# Patient Record
Sex: FEMALE | Race: WHITE | NOT HISPANIC OR LATINO | Employment: OTHER | URBAN - METROPOLITAN AREA
[De-identification: names, ages, dates, MRNs, and addresses within clinical notes are randomized per-mention and may not be internally consistent; named-entity substitution may affect disease eponyms.]

---

## 2017-01-06 ENCOUNTER — CONVERSION ENCOUNTER (OUTPATIENT)
Dept: GENERAL RADIOLOGY | Facility: HOSPITAL | Age: 72
End: 2017-01-06

## 2018-02-13 ENCOUNTER — CONVERSION ENCOUNTER (OUTPATIENT)
Dept: GENERAL RADIOLOGY | Facility: HOSPITAL | Age: 73
End: 2018-02-13

## 2019-02-25 ENCOUNTER — HOSPITAL ENCOUNTER (OUTPATIENT)
Dept: GENERAL RADIOLOGY | Facility: HOSPITAL | Age: 74
Discharge: HOME | End: 2019-02-25
Attending: NURSE PRACTITIONER

## 2019-03-15 ENCOUNTER — HOSPITAL ENCOUNTER (OUTPATIENT)
Dept: OTHER | Facility: HOSPITAL | Age: 74
Discharge: HOME | End: 2019-03-15
Attending: NURSE PRACTITIONER

## 2019-03-15 LAB
ALBUMIN SERPL-MCNC: 4 G/DL (ref 3.5–5)
ALBUMIN/GLOB SERPL: 1.4 {RATIO} (ref 1.4–2.6)
ALP SERPL-CCNC: 34 U/L (ref 43–160)
ALT SERPL-CCNC: 13 U/L (ref 10–40)
ANION GAP SERPL CALC-SCNC: 14 MMOL/L (ref 8–19)
AST SERPL-CCNC: 21 U/L (ref 15–50)
BILIRUB SERPL-MCNC: 0.49 MG/DL (ref 0.2–1.3)
BUN SERPL-MCNC: 20 MG/DL (ref 5–25)
BUN/CREAT SERPL: 19 {RATIO} (ref 6–20)
CALCIUM SERPL-MCNC: 9.6 MG/DL (ref 8.7–10.4)
CHLORIDE SERPL-SCNC: 104 MMOL/L (ref 99–111)
CONV CO2: 29 MMOL/L (ref 22–32)
CONV TOTAL PROTEIN: 6.9 G/DL (ref 6.3–8.2)
CREAT UR-MCNC: 1.04 MG/DL (ref 0.5–0.9)
GFR SERPLBLD BASED ON 1.73 SQ M-ARVRAT: 54 ML/MIN/{1.73_M2}
GLOBULIN UR ELPH-MCNC: 2.9 G/DL (ref 2–3.5)
GLUCOSE SERPL-MCNC: 89 MG/DL (ref 65–99)
OSMOLALITY SERPL CALC.SUM OF ELEC: 298 MOSM/KG (ref 273–304)
POTASSIUM SERPL-SCNC: 4.1 MMOL/L (ref 3.5–5.3)
SODIUM SERPL-SCNC: 143 MMOL/L (ref 135–147)

## 2019-03-27 ENCOUNTER — HOSPITAL ENCOUNTER (OUTPATIENT)
Dept: OTHER | Facility: HOSPITAL | Age: 74
Discharge: HOME | End: 2019-03-27
Attending: NURSE PRACTITIONER

## 2019-03-27 LAB
APPEARANCE UR: CLEAR
BILIRUB UR QL: NEGATIVE
COLOR UR: YELLOW
CONV COLLECTION SOURCE (UA): NORMAL
CONV UROBILINOGEN IN URINE BY AUTOMATED TEST STRIP: 0.2 {EHRLICHU}/DL (ref 0.1–1)
GLUCOSE UR QL: NEGATIVE MG/DL
HGB UR QL STRIP: NEGATIVE
KETONES UR QL STRIP: NEGATIVE MG/DL
LEUKOCYTE ESTERASE UR QL STRIP: NEGATIVE
NITRITE UR QL STRIP: NEGATIVE
PH UR STRIP.AUTO: 6 [PH] (ref 5–8)
PROT UR QL: NEGATIVE MG/DL
SP GR UR: 1.02 (ref 1–1.03)

## 2019-03-30 LAB
AMOXICILLIN+CLAV SUSC ISLT: 16
AMPICILLIN SUSC ISLT: >=32
AMPICILLIN+SULBAC SUSC ISLT: >=32
BACTERIA UR CULT: ABNORMAL
CEFAZOLIN SUSC ISLT: <=4
CEFEPIME SUSC ISLT: <=1
CEFTAZIDIME SUSC ISLT: <=1
CEFTRIAXONE SUSC ISLT: <=1
CEFUROXIME ORAL SUSC ISLT: 16
CEFUROXIME PARENTER SUSC ISLT: 16
CIPROFLOXACIN SUSC ISLT: >=4
ERTAPENEM SUSC ISLT: <=0.5
GENTAMICIN SUSC ISLT: <=1
LEVOFLOXACIN SUSC ISLT: >=8
NITROFURANTOIN SUSC ISLT: <=16
TETRACYCLINE SUSC ISLT: <=1
TMP SMX SUSC ISLT: >=320
TOBRAMYCIN SUSC ISLT: <=1

## 2019-06-11 ENCOUNTER — HOSPITAL ENCOUNTER (OUTPATIENT)
Dept: OTHER | Facility: HOSPITAL | Age: 74
Discharge: HOME | End: 2019-06-11
Attending: NURSE PRACTITIONER

## 2019-06-11 LAB
25(OH)D3 SERPL-MCNC: 30.3 NG/ML (ref 30–100)
ALBUMIN SERPL-MCNC: 4.3 G/DL (ref 3.5–5)
ALBUMIN/GLOB SERPL: 1.7 {RATIO} (ref 1.4–2.6)
ALP SERPL-CCNC: 35 U/L (ref 43–160)
ALT SERPL-CCNC: 13 U/L (ref 10–40)
ANION GAP SERPL CALC-SCNC: 17 MMOL/L (ref 8–19)
APPEARANCE UR: CLEAR
AST SERPL-CCNC: 20 U/L (ref 15–50)
BASOPHILS # BLD AUTO: 0.07 10*3/UL (ref 0–0.2)
BASOPHILS NFR BLD AUTO: 1.4 % (ref 0–3)
BILIRUB SERPL-MCNC: 0.54 MG/DL (ref 0.2–1.3)
BILIRUB UR QL: NEGATIVE
BUN SERPL-MCNC: 24 MG/DL (ref 5–25)
BUN/CREAT SERPL: 23 {RATIO} (ref 6–20)
CALCIUM SERPL-MCNC: 9.6 MG/DL (ref 8.7–10.4)
CHLORIDE SERPL-SCNC: 102 MMOL/L (ref 99–111)
CHOLEST SERPL-MCNC: 138 MG/DL (ref 107–200)
CHOLEST/HDLC SERPL: 2.6 {RATIO} (ref 3–6)
COLOR UR: YELLOW
CONV ABS IMM GRAN: 0.01 10*3/UL (ref 0–0.2)
CONV BACTERIA: ABNORMAL
CONV CO2: 29 MMOL/L (ref 22–32)
CONV COLLECTION SOURCE (UA): ABNORMAL
CONV HYALINE CASTS IN URINE MICRO: ABNORMAL /[LPF]
CONV IMMATURE GRAN: 0.2 % (ref 0–1.8)
CONV TOTAL PROTEIN: 6.8 G/DL (ref 6.3–8.2)
CONV UROBILINOGEN IN URINE BY AUTOMATED TEST STRIP: 0.2 {EHRLICHU}/DL (ref 0.1–1)
CREAT UR-MCNC: 1.04 MG/DL (ref 0.5–0.9)
DEPRECATED RDW RBC AUTO: 47.1 FL (ref 36.4–46.3)
EOSINOPHIL # BLD AUTO: 0.37 10*3/UL (ref 0–0.7)
EOSINOPHIL # BLD AUTO: 7.4 % (ref 0–7)
ERYTHROCYTE [DISTWIDTH] IN BLOOD BY AUTOMATED COUNT: 13.7 % (ref 11.7–14.4)
EST. AVERAGE GLUCOSE BLD GHB EST-MCNC: 103 MG/DL
FOLATE SERPL-MCNC: 17.4 NG/ML (ref 4.8–20)
GFR SERPLBLD BASED ON 1.73 SQ M-ARVRAT: 54 ML/MIN/{1.73_M2}
GLOBULIN UR ELPH-MCNC: 2.5 G/DL (ref 2–3.5)
GLUCOSE SERPL-MCNC: 96 MG/DL (ref 65–99)
GLUCOSE UR QL: NEGATIVE MG/DL
HBA1C MFR BLD: 15 G/DL (ref 12–16)
HBA1C MFR BLD: 5.2 % (ref 3.5–5.7)
HCT VFR BLD AUTO: 45.9 % (ref 37–47)
HDLC SERPL-MCNC: 54 MG/DL (ref 40–60)
HGB UR QL STRIP: NEGATIVE
IRON SATN MFR SERPL: 28 % (ref 20–55)
IRON SERPL-MCNC: 104 UG/DL (ref 60–170)
KETONES UR QL STRIP: NEGATIVE MG/DL
LDLC SERPL CALC-MCNC: 60 MG/DL (ref 70–100)
LEUKOCYTE ESTERASE UR QL STRIP: ABNORMAL
LYMPHOCYTES # BLD AUTO: 1.71 10*3/UL (ref 1–5)
MCH RBC QN AUTO: 30.5 PG (ref 27–31)
MCHC RBC AUTO-ENTMCNC: 32.7 G/DL (ref 33–37)
MCV RBC AUTO: 93.5 FL (ref 81–99)
MONOCYTES # BLD AUTO: 0.39 10*3/UL (ref 0.2–1.2)
MONOCYTES NFR BLD AUTO: 7.8 % (ref 3–10)
NEUTROPHILS # BLD AUTO: 2.45 10*3/UL (ref 2–8)
NEUTROPHILS NFR BLD AUTO: 49 % (ref 30–85)
NITRITE UR QL STRIP: NEGATIVE
NRBC CBCN: 0 % (ref 0–0.7)
OSMOLALITY SERPL CALC.SUM OF ELEC: 302 MOSM/KG (ref 273–304)
PH UR STRIP.AUTO: 6.5 [PH] (ref 5–8)
PLATELET # BLD AUTO: 214 10*3/UL (ref 130–400)
PMV BLD AUTO: 11 FL (ref 9.4–12.3)
POTASSIUM SERPL-SCNC: 4 MMOL/L (ref 3.5–5.3)
PROT UR QL: NEGATIVE MG/DL
RBC # BLD AUTO: 4.91 10*6/UL (ref 4.2–5.4)
RBC #/AREA URNS HPF: ABNORMAL /[HPF]
SODIUM SERPL-SCNC: 144 MMOL/L (ref 135–147)
SP GR UR: 1.02 (ref 1–1.03)
SQUAMOUS SPT QL MICRO: ABNORMAL /[HPF]
T4 FREE SERPL-MCNC: 1.2 NG/DL (ref 0.9–1.8)
TIBC SERPL-MCNC: 378 UG/DL (ref 245–450)
TRANSFERRIN SERPL-MCNC: 264 MG/DL (ref 250–380)
TRIGL SERPL-MCNC: 121 MG/DL (ref 40–150)
TSH SERPL-ACNC: 2.62 M[IU]/L (ref 0.27–4.2)
URATE SERPL-MCNC: 4.8 MG/DL (ref 2.5–7.5)
VARIANT LYMPHS NFR BLD MANUAL: 34.2 % (ref 20–45)
VIT B12 SERPL-MCNC: 544 PG/ML (ref 211–911)
VLDLC SERPL-MCNC: 24 MG/DL (ref 5–37)
WBC # BLD AUTO: 5 10*3/UL (ref 4.8–10.8)
WBC #/AREA URNS HPF: ABNORMAL /[HPF]

## 2019-06-13 LAB
AMPICILLIN SUSC ISLT: <=0.25
BACTERIA UR CULT: ABNORMAL
CEFOTAXIME SUSC ISLT: <=0.12
CEFTRIAXONE SUSC ISLT: <=0.12
LEVOFLOXACIN SUSC ISLT: 0.5
PENICILLIN G SUSC ISLT: <=0.06
TETRACYCLINE SUSC ISLT: >=16
VANCOMYCIN SUSC ISLT: <=0.12

## 2019-07-18 ENCOUNTER — HOSPITAL ENCOUNTER (OUTPATIENT)
Dept: OTHER | Facility: HOSPITAL | Age: 74
Discharge: HOME | End: 2019-07-18
Attending: NURSE PRACTITIONER

## 2019-07-18 LAB
APPEARANCE UR: CLEAR
BILIRUB UR QL: NEGATIVE
COLOR UR: YELLOW
CONV BACTERIA: NEGATIVE
CONV COLLECTION SOURCE (UA): ABNORMAL
CONV UROBILINOGEN IN URINE BY AUTOMATED TEST STRIP: 0.2 {EHRLICHU}/DL (ref 0.1–1)
GLUCOSE UR QL: NEGATIVE MG/DL
HGB UR QL STRIP: NEGATIVE
KETONES UR QL STRIP: NEGATIVE MG/DL
LEUKOCYTE ESTERASE UR QL STRIP: ABNORMAL
NITRITE UR QL STRIP: NEGATIVE
PH UR STRIP.AUTO: 7 [PH] (ref 5–8)
PROT UR QL: NEGATIVE MG/DL
RBC #/AREA URNS HPF: ABNORMAL /[HPF]
SP GR UR: 1.02 (ref 1–1.03)
SQUAMOUS SPT QL MICRO: ABNORMAL /[HPF]
WBC #/AREA URNS HPF: ABNORMAL /[HPF]

## 2019-07-20 LAB — BACTERIA UR CULT: NORMAL

## 2019-08-09 ENCOUNTER — HOSPITAL ENCOUNTER (OUTPATIENT)
Dept: OTHER | Facility: HOSPITAL | Age: 74
Discharge: HOME | End: 2019-08-09
Attending: INTERNAL MEDICINE

## 2019-08-09 LAB
25(OH)D3 SERPL-MCNC: 33.6 NG/ML (ref 30–100)
ALBUMIN SERPL-MCNC: 4.7 G/DL (ref 3.5–5)
ANION GAP SERPL CALC-SCNC: 21 MMOL/L (ref 8–19)
APPEARANCE UR: CLEAR
BASOPHILS # BLD AUTO: 0.06 10*3/UL (ref 0–0.2)
BASOPHILS NFR BLD AUTO: 1.3 % (ref 0–3)
BILIRUB UR QL: NEGATIVE
BUN SERPL-MCNC: 25 MG/DL (ref 5–25)
BUN/CREAT SERPL: 23 {RATIO} (ref 6–20)
CALCIUM SERPL-MCNC: 10.3 MG/DL (ref 8.7–10.4)
CHLORIDE SERPL-SCNC: 102 MMOL/L (ref 99–111)
COLOR UR: YELLOW
CONV ABS IMM GRAN: 0 10*3/UL (ref 0–0.2)
CONV BACTERIA: NEGATIVE
CONV CO2: 25 MMOL/L (ref 22–32)
CONV COLLECTION SOURCE (UA): ABNORMAL
CONV CREATININE URINE, RANDOM: 85.8 MG/DL (ref 10–300)
CONV IMMATURE GRAN: 0 % (ref 0–1.8)
CONV UROBILINOGEN IN URINE BY AUTOMATED TEST STRIP: 0.2 {EHRLICHU}/DL (ref 0.1–1)
CREAT UR-MCNC: 1.07 MG/DL (ref 0.5–0.9)
DEPRECATED RDW RBC AUTO: 46.2 FL (ref 36.4–46.3)
EOSINOPHIL # BLD AUTO: 0.31 10*3/UL (ref 0–0.7)
EOSINOPHIL # BLD AUTO: 6.6 % (ref 0–7)
ERYTHROCYTE [DISTWIDTH] IN BLOOD BY AUTOMATED COUNT: 13.4 % (ref 11.7–14.4)
GFR SERPLBLD BASED ON 1.73 SQ M-ARVRAT: 52 ML/MIN/{1.73_M2}
GLUCOSE SERPL-MCNC: 94 MG/DL (ref 65–99)
GLUCOSE UR QL: NEGATIVE MG/DL
HCT VFR BLD AUTO: 47.5 % (ref 37–47)
HGB BLD-MCNC: 15.3 G/DL (ref 12–16)
HGB UR QL STRIP: NEGATIVE
KETONES UR QL STRIP: NEGATIVE MG/DL
LEUKOCYTE ESTERASE UR QL STRIP: ABNORMAL
LYMPHOCYTES # BLD AUTO: 1.53 10*3/UL (ref 1–5)
LYMPHOCYTES NFR BLD AUTO: 32.8 % (ref 20–45)
MCH RBC QN AUTO: 30.2 PG (ref 27–31)
MCHC RBC AUTO-ENTMCNC: 32.2 G/DL (ref 33–37)
MCV RBC AUTO: 93.9 FL (ref 81–99)
MONOCYTES # BLD AUTO: 0.41 10*3/UL (ref 0.2–1.2)
MONOCYTES NFR BLD AUTO: 8.8 % (ref 3–10)
NEUTROPHILS # BLD AUTO: 2.36 10*3/UL (ref 2–8)
NEUTROPHILS NFR BLD AUTO: 50.5 % (ref 30–85)
NITRITE UR QL STRIP: NEGATIVE
NRBC CBCN: 0 % (ref 0–0.7)
PH UR STRIP.AUTO: 6 [PH] (ref 5–8)
PHOSPHATE SERPL-MCNC: 3.2 MG/DL (ref 2.4–4.5)
PLATELET # BLD AUTO: 232 10*3/UL (ref 130–400)
PMV BLD AUTO: 10.8 FL (ref 9.4–12.3)
POTASSIUM SERPL-SCNC: 4.3 MMOL/L (ref 3.5–5.3)
PROT UR QL: NEGATIVE MG/DL
PROT UR-MCNC: 6.1 MG/DL
PTH-INTACT SERPL-MCNC: 24.7 PG/ML (ref 11.1–79.5)
RBC # BLD AUTO: 5.06 10*6/UL (ref 4.2–5.4)
RBC #/AREA URNS HPF: ABNORMAL /[HPF]
SODIUM SERPL-SCNC: 144 MMOL/L (ref 135–147)
SP GR UR: 1.02 (ref 1–1.03)
SQUAMOUS SPT QL MICRO: ABNORMAL /[HPF]
WBC # BLD AUTO: 4.67 10*3/UL (ref 4.8–10.8)
WBC #/AREA URNS HPF: ABNORMAL /[HPF]

## 2019-12-04 ENCOUNTER — HOSPITAL ENCOUNTER (OUTPATIENT)
Dept: OTHER | Facility: HOSPITAL | Age: 74
Discharge: HOME | End: 2019-12-04
Attending: NURSE PRACTITIONER

## 2019-12-04 LAB
25(OH)D3 SERPL-MCNC: 34.6 NG/ML (ref 30–100)
ALBUMIN SERPL-MCNC: 4.3 G/DL (ref 3.5–5)
ALBUMIN/GLOB SERPL: 1.5 {RATIO} (ref 1.4–2.6)
ALP SERPL-CCNC: 40 U/L (ref 43–160)
ALT SERPL-CCNC: 19 U/L (ref 10–40)
ANION GAP SERPL CALC-SCNC: 17 MMOL/L (ref 8–19)
AST SERPL-CCNC: 27 U/L (ref 15–50)
BASOPHILS # BLD AUTO: 0.05 10*3/UL (ref 0–0.2)
BASOPHILS NFR BLD AUTO: 1.1 % (ref 0–3)
BILIRUB SERPL-MCNC: 0.4 MG/DL (ref 0.2–1.3)
BUN SERPL-MCNC: 23 MG/DL (ref 5–25)
BUN/CREAT SERPL: 23 {RATIO} (ref 6–20)
CALCIUM SERPL-MCNC: 10.1 MG/DL (ref 8.7–10.4)
CHLORIDE SERPL-SCNC: 101 MMOL/L (ref 99–111)
CHOLEST SERPL-MCNC: 146 MG/DL (ref 107–200)
CHOLEST/HDLC SERPL: 2.5 {RATIO} (ref 3–6)
CONV ABS IMM GRAN: 0.01 10*3/UL (ref 0–0.2)
CONV CO2: 28 MMOL/L (ref 22–32)
CONV CREATININE URINE, RANDOM: 59 MG/DL (ref 10–300)
CONV IMMATURE GRAN: 0.2 % (ref 0–1.8)
CONV MICROALBUM.,U,RANDOM: <12 MG/L (ref 0–20)
CONV TOTAL PROTEIN: 7.2 G/DL (ref 6.3–8.2)
CREAT UR-MCNC: 1.02 MG/DL (ref 0.5–0.9)
DEPRECATED RDW RBC AUTO: 47.7 FL (ref 36.4–46.3)
EOSINOPHIL # BLD AUTO: 0.31 10*3/UL (ref 0–0.7)
EOSINOPHIL # BLD AUTO: 6.8 % (ref 0–7)
ERYTHROCYTE [DISTWIDTH] IN BLOOD BY AUTOMATED COUNT: 13.6 % (ref 11.7–14.4)
EST. AVERAGE GLUCOSE BLD GHB EST-MCNC: 100 MG/DL
FOLATE SERPL-MCNC: 12.9 NG/ML (ref 4.8–20)
GFR SERPLBLD BASED ON 1.73 SQ M-ARVRAT: 55 ML/MIN/{1.73_M2}
GLOBULIN UR ELPH-MCNC: 2.9 G/DL (ref 2–3.5)
GLUCOSE SERPL-MCNC: 90 MG/DL (ref 65–99)
HBA1C MFR BLD: 5.1 % (ref 3.5–5.7)
HCT VFR BLD AUTO: 47.5 % (ref 37–47)
HDLC SERPL-MCNC: 58 MG/DL (ref 40–60)
HGB BLD-MCNC: 15.2 G/DL (ref 12–16)
IRON SATN MFR SERPL: 20 % (ref 20–55)
IRON SERPL-MCNC: 86 UG/DL (ref 60–170)
LDLC SERPL CALC-MCNC: 68 MG/DL (ref 70–100)
LYMPHOCYTES # BLD AUTO: 1.55 10*3/UL (ref 1–5)
LYMPHOCYTES NFR BLD AUTO: 34.1 % (ref 20–45)
MCH RBC QN AUTO: 30.5 PG (ref 27–31)
MCHC RBC AUTO-ENTMCNC: 32 G/DL (ref 33–37)
MCV RBC AUTO: 95.4 FL (ref 81–99)
MICROALBUMIN/CREAT UR: 20.3 MG/G{CRE} (ref 0–35)
MONOCYTES # BLD AUTO: 0.35 10*3/UL (ref 0.2–1.2)
MONOCYTES NFR BLD AUTO: 7.7 % (ref 3–10)
NEUTROPHILS # BLD AUTO: 2.27 10*3/UL (ref 2–8)
NEUTROPHILS NFR BLD AUTO: 50.1 % (ref 30–85)
NRBC CBCN: 0 % (ref 0–0.7)
OSMOLALITY SERPL CALC.SUM OF ELEC: 297 MOSM/KG (ref 273–304)
PLATELET # BLD AUTO: 222 10*3/UL (ref 130–400)
PMV BLD AUTO: 11.2 FL (ref 9.4–12.3)
POTASSIUM SERPL-SCNC: 4 MMOL/L (ref 3.5–5.3)
RBC # BLD AUTO: 4.98 10*6/UL (ref 4.2–5.4)
SODIUM SERPL-SCNC: 142 MMOL/L (ref 135–147)
T4 FREE SERPL-MCNC: 1.2 NG/DL (ref 0.9–1.8)
TIBC SERPL-MCNC: 423 UG/DL (ref 245–450)
TRANSFERRIN SERPL-MCNC: 296 MG/DL (ref 250–380)
TRIGL SERPL-MCNC: 98 MG/DL (ref 40–150)
TSH SERPL-ACNC: 1.7 M[IU]/L (ref 0.27–4.2)
URATE SERPL-MCNC: 4.6 MG/DL (ref 2.5–7.5)
VIT B12 SERPL-MCNC: 401 PG/ML (ref 211–911)
VLDLC SERPL-MCNC: 20 MG/DL (ref 5–37)
WBC # BLD AUTO: 4.54 10*3/UL (ref 4.8–10.8)

## 2020-02-27 ENCOUNTER — HOSPITAL ENCOUNTER (OUTPATIENT)
Dept: GENERAL RADIOLOGY | Facility: HOSPITAL | Age: 75
Discharge: HOME | End: 2020-02-27
Attending: NURSE PRACTITIONER

## 2020-05-15 ENCOUNTER — HOSPITAL ENCOUNTER (OUTPATIENT)
Dept: LAB | Facility: HOSPITAL | Age: 75
Discharge: HOME | End: 2020-05-15
Attending: NURSE PRACTITIONER

## 2020-05-15 LAB
25(OH)D3 SERPL-MCNC: 36.5 NG/ML (ref 30–100)
ALBUMIN SERPL-MCNC: 4.4 G/DL (ref 3.5–5)
ALBUMIN/GLOB SERPL: 1.6 {RATIO} (ref 1.4–2.6)
ALP SERPL-CCNC: 34 U/L (ref 43–160)
ALT SERPL-CCNC: 16 U/L (ref 10–40)
ANION GAP SERPL CALC-SCNC: 17 MMOL/L (ref 8–19)
AST SERPL-CCNC: 25 U/L (ref 15–50)
BASOPHILS # BLD AUTO: 0.04 10*3/UL (ref 0–0.2)
BASOPHILS NFR BLD AUTO: 0.7 % (ref 0–3)
BILIRUB SERPL-MCNC: 0.45 MG/DL (ref 0.2–1.3)
BUN SERPL-MCNC: 28 MG/DL (ref 5–25)
BUN/CREAT SERPL: 26 {RATIO} (ref 6–20)
CALCIUM SERPL-MCNC: 9.9 MG/DL (ref 8.7–10.4)
CHLORIDE SERPL-SCNC: 102 MMOL/L (ref 99–111)
CHOLEST SERPL-MCNC: 141 MG/DL (ref 107–200)
CHOLEST/HDLC SERPL: 2.4 {RATIO} (ref 3–6)
CONV ABS IMM GRAN: 0.01 10*3/UL (ref 0–0.2)
CONV CO2: 27 MMOL/L (ref 22–32)
CONV CREATININE URINE, RANDOM: 61 MG/DL (ref 10–300)
CONV IMMATURE GRAN: 0.2 % (ref 0–1.8)
CONV MICROALBUM.,U,RANDOM: <12 MG/L (ref 0–20)
CONV TOTAL PROTEIN: 7.1 G/DL (ref 6.3–8.2)
CREAT UR-MCNC: 1.09 MG/DL (ref 0.5–0.9)
DEPRECATED RDW RBC AUTO: 49.3 FL (ref 36.4–46.3)
EOSINOPHIL # BLD AUTO: 0.56 10*3/UL (ref 0–0.7)
EOSINOPHIL # BLD AUTO: 10.4 % (ref 0–7)
ERYTHROCYTE [DISTWIDTH] IN BLOOD BY AUTOMATED COUNT: 14 % (ref 11.7–14.4)
EST. AVERAGE GLUCOSE BLD GHB EST-MCNC: 111 MG/DL
FOLATE SERPL-MCNC: 12.8 NG/ML (ref 4.8–20)
GFR SERPLBLD BASED ON 1.73 SQ M-ARVRAT: 51 ML/MIN/{1.73_M2}
GLOBULIN UR ELPH-MCNC: 2.7 G/DL (ref 2–3.5)
GLUCOSE SERPL-MCNC: 92 MG/DL (ref 65–99)
HBA1C MFR BLD: 5.5 % (ref 3.5–5.7)
HCT VFR BLD AUTO: 46.6 % (ref 37–47)
HDLC SERPL-MCNC: 58 MG/DL (ref 40–60)
HGB BLD-MCNC: 14.6 G/DL (ref 12–16)
IRON SATN MFR SERPL: 27 % (ref 20–55)
IRON SERPL-MCNC: 103 UG/DL (ref 60–170)
LDLC SERPL CALC-MCNC: 62 MG/DL (ref 70–100)
LYMPHOCYTES # BLD AUTO: 1.76 10*3/UL (ref 1–5)
LYMPHOCYTES NFR BLD AUTO: 32.7 % (ref 20–45)
MCH RBC QN AUTO: 30.2 PG (ref 27–31)
MCHC RBC AUTO-ENTMCNC: 31.3 G/DL (ref 33–37)
MCV RBC AUTO: 96.5 FL (ref 81–99)
MICROALBUMIN/CREAT UR: 19.7 MG/G{CRE} (ref 0–35)
MONOCYTES # BLD AUTO: 0.42 10*3/UL (ref 0.2–1.2)
MONOCYTES NFR BLD AUTO: 7.8 % (ref 3–10)
NEUTROPHILS # BLD AUTO: 2.59 10*3/UL (ref 2–8)
NEUTROPHILS NFR BLD AUTO: 48.2 % (ref 30–85)
NRBC CBCN: 0 % (ref 0–0.7)
OSMOLALITY SERPL CALC.SUM OF ELEC: 299 MOSM/KG (ref 273–304)
PLATELET # BLD AUTO: 218 10*3/UL (ref 130–400)
PMV BLD AUTO: 11.1 FL (ref 9.4–12.3)
POTASSIUM SERPL-SCNC: 4.1 MMOL/L (ref 3.5–5.3)
RBC # BLD AUTO: 4.83 10*6/UL (ref 4.2–5.4)
SODIUM SERPL-SCNC: 142 MMOL/L (ref 135–147)
T4 FREE SERPL-MCNC: 1.3 NG/DL (ref 0.9–1.8)
TIBC SERPL-MCNC: 380 UG/DL (ref 245–450)
TRANSFERRIN SERPL-MCNC: 266 MG/DL (ref 250–380)
TRIGL SERPL-MCNC: 104 MG/DL (ref 40–150)
TSH SERPL-ACNC: 2 M[IU]/L (ref 0.27–4.2)
URATE SERPL-MCNC: 5.1 MG/DL (ref 2.5–7.5)
VIT B12 SERPL-MCNC: 470 PG/ML (ref 211–911)
VLDLC SERPL-MCNC: 21 MG/DL (ref 5–37)
WBC # BLD AUTO: 5.38 10*3/UL (ref 4.8–10.8)

## 2020-08-04 ENCOUNTER — HOSPITAL ENCOUNTER (OUTPATIENT)
Dept: LAB | Facility: HOSPITAL | Age: 75
Discharge: HOME | End: 2020-08-04
Attending: INTERNAL MEDICINE

## 2020-08-04 LAB
25(OH)D3 SERPL-MCNC: 39.2 NG/ML (ref 30–100)
ALBUMIN SERPL-MCNC: 4.2 G/DL (ref 3.5–5)
ANION GAP SERPL CALC-SCNC: 18 MMOL/L (ref 8–19)
APPEARANCE UR: CLEAR
BASOPHILS # BLD AUTO: 0.03 10*3/UL (ref 0–0.2)
BASOPHILS NFR BLD AUTO: 0.7 % (ref 0–3)
BILIRUB UR QL: NEGATIVE
BUN SERPL-MCNC: 26 MG/DL (ref 5–25)
BUN/CREAT SERPL: 22 {RATIO} (ref 6–20)
CALCIUM SERPL-MCNC: 10.3 MG/DL (ref 8.7–10.4)
CHLORIDE SERPL-SCNC: 101 MMOL/L (ref 99–111)
COLOR UR: YELLOW
CONV ABS IMM GRAN: 0 10*3/UL (ref 0–0.2)
CONV CO2: 26 MMOL/L (ref 22–32)
CONV COLLECTION SOURCE (UA): NORMAL
CONV CREATININE URINE, RANDOM: 66.4 MG/DL (ref 10–300)
CONV IMMATURE GRAN: 0 % (ref 0–1.8)
CONV UROBILINOGEN IN URINE BY AUTOMATED TEST STRIP: 1 {EHRLICHU}/DL (ref 0.1–1)
CREAT UR-MCNC: 1.16 MG/DL (ref 0.5–0.9)
DEPRECATED RDW RBC AUTO: 48.6 FL (ref 36.4–46.3)
EOSINOPHIL # BLD AUTO: 0.43 10*3/UL (ref 0–0.7)
EOSINOPHIL # BLD AUTO: 9.7 % (ref 0–7)
ERYTHROCYTE [DISTWIDTH] IN BLOOD BY AUTOMATED COUNT: 13.6 % (ref 11.7–14.4)
GFR SERPLBLD BASED ON 1.73 SQ M-ARVRAT: 47 ML/MIN/{1.73_M2}
GLUCOSE SERPL-MCNC: 89 MG/DL (ref 65–99)
GLUCOSE UR QL: NEGATIVE MG/DL
HCT VFR BLD AUTO: 46.8 % (ref 37–47)
HGB BLD-MCNC: 14.9 G/DL (ref 12–16)
HGB UR QL STRIP: NEGATIVE
KETONES UR QL STRIP: NEGATIVE MG/DL
LEUKOCYTE ESTERASE UR QL STRIP: NEGATIVE
LYMPHOCYTES # BLD AUTO: 1.46 10*3/UL (ref 1–5)
LYMPHOCYTES NFR BLD AUTO: 33 % (ref 20–45)
MCH RBC QN AUTO: 30.7 PG (ref 27–31)
MCHC RBC AUTO-ENTMCNC: 31.8 G/DL (ref 33–37)
MCV RBC AUTO: 96.3 FL (ref 81–99)
MONOCYTES # BLD AUTO: 0.31 10*3/UL (ref 0.2–1.2)
MONOCYTES NFR BLD AUTO: 7 % (ref 3–10)
NEUTROPHILS # BLD AUTO: 2.2 10*3/UL (ref 2–8)
NEUTROPHILS NFR BLD AUTO: 49.6 % (ref 30–85)
NITRITE UR QL STRIP: NEGATIVE
NRBC CBCN: 0 % (ref 0–0.7)
PH UR STRIP.AUTO: 7 [PH] (ref 5–8)
PHOSPHATE SERPL-MCNC: 3.5 MG/DL (ref 2.4–4.5)
PLATELET # BLD AUTO: 205 10*3/UL (ref 130–400)
PMV BLD AUTO: 11.2 FL (ref 9.4–12.3)
POTASSIUM SERPL-SCNC: 3.8 MMOL/L (ref 3.5–5.3)
PROT UR QL: NEGATIVE MG/DL
PROT UR-MCNC: <4 MG/DL
PTH-INTACT SERPL-MCNC: 20.4 PG/ML (ref 11.1–79.5)
RBC # BLD AUTO: 4.86 10*6/UL (ref 4.2–5.4)
SODIUM SERPL-SCNC: 141 MMOL/L (ref 135–147)
SP GR UR: 1.01 (ref 1–1.03)
WBC # BLD AUTO: 4.43 10*3/UL (ref 4.8–10.8)

## 2020-09-08 ENCOUNTER — HOSPITAL ENCOUNTER (OUTPATIENT)
Dept: LAB | Facility: HOSPITAL | Age: 75
Discharge: HOME | End: 2020-09-08
Attending: NURSE PRACTITIONER

## 2020-09-08 LAB
25(OH)D3 SERPL-MCNC: 40 NG/ML (ref 30–100)
ALBUMIN SERPL-MCNC: 4.2 G/DL (ref 3.5–5)
ALBUMIN/GLOB SERPL: 1.7 {RATIO} (ref 1.4–2.6)
ALP SERPL-CCNC: 36 U/L (ref 43–160)
ALT SERPL-CCNC: 14 U/L (ref 10–40)
ANION GAP SERPL CALC-SCNC: 14 MMOL/L (ref 8–19)
AST SERPL-CCNC: 23 U/L (ref 15–50)
BASOPHILS # BLD AUTO: 0.04 10*3/UL (ref 0–0.2)
BASOPHILS NFR BLD AUTO: 0.9 % (ref 0–3)
BILIRUB SERPL-MCNC: 0.5 MG/DL (ref 0.2–1.3)
BUN SERPL-MCNC: 43 MG/DL (ref 5–25)
BUN/CREAT SERPL: 37 {RATIO} (ref 6–20)
CALCIUM SERPL-MCNC: 9.4 MG/DL (ref 8.7–10.4)
CHLORIDE SERPL-SCNC: 102 MMOL/L (ref 99–111)
CHOLEST SERPL-MCNC: 141 MG/DL (ref 107–200)
CHOLEST/HDLC SERPL: 2.8 {RATIO} (ref 3–6)
CONV ABS IMM GRAN: 0.01 10*3/UL (ref 0–0.2)
CONV CO2: 27 MMOL/L (ref 22–32)
CONV CREATININE URINE, RANDOM: 57.7 MG/DL (ref 10–300)
CONV IMMATURE GRAN: 0.2 % (ref 0–1.8)
CONV MICROALBUM.,U,RANDOM: 48.1 MG/L (ref 0–20)
CONV TOTAL PROTEIN: 6.7 G/DL (ref 6.3–8.2)
CREAT UR-MCNC: 1.17 MG/DL (ref 0.5–0.9)
DEPRECATED RDW RBC AUTO: 48 FL (ref 36.4–46.3)
EOSINOPHIL # BLD AUTO: 0.32 10*3/UL (ref 0–0.7)
EOSINOPHIL # BLD AUTO: 7.3 % (ref 0–7)
ERYTHROCYTE [DISTWIDTH] IN BLOOD BY AUTOMATED COUNT: 13.6 % (ref 11.7–14.4)
EST. AVERAGE GLUCOSE BLD GHB EST-MCNC: 105 MG/DL
FOLATE SERPL-MCNC: 10.5 NG/ML (ref 4.8–20)
GFR SERPLBLD BASED ON 1.73 SQ M-ARVRAT: 46 ML/MIN/{1.73_M2}
GLOBULIN UR ELPH-MCNC: 2.5 G/DL (ref 2–3.5)
GLUCOSE SERPL-MCNC: 97 MG/DL (ref 65–99)
HBA1C MFR BLD: 5.3 % (ref 3.5–5.7)
HCT VFR BLD AUTO: 46.7 % (ref 37–47)
HDLC SERPL-MCNC: 51 MG/DL (ref 40–60)
HGB BLD-MCNC: 14.9 G/DL (ref 12–16)
IRON SATN MFR SERPL: 23 % (ref 20–55)
IRON SERPL-MCNC: 92 UG/DL (ref 60–170)
LDLC SERPL CALC-MCNC: 69 MG/DL (ref 70–100)
LYMPHOCYTES # BLD AUTO: 1.29 10*3/UL (ref 1–5)
LYMPHOCYTES NFR BLD AUTO: 29.3 % (ref 20–45)
MCH RBC QN AUTO: 30.3 PG (ref 27–31)
MCHC RBC AUTO-ENTMCNC: 31.9 G/DL (ref 33–37)
MCV RBC AUTO: 95.1 FL (ref 81–99)
MICROALBUMIN/CREAT UR: 83.4 MG/G{CRE} (ref 0–35)
MONOCYTES # BLD AUTO: 0.45 10*3/UL (ref 0.2–1.2)
MONOCYTES NFR BLD AUTO: 10.2 % (ref 3–10)
NEUTROPHILS # BLD AUTO: 2.3 10*3/UL (ref 2–8)
NEUTROPHILS NFR BLD AUTO: 52.1 % (ref 30–85)
NRBC CBCN: 0 % (ref 0–0.7)
OSMOLALITY SERPL CALC.SUM OF ELEC: 299 MOSM/KG (ref 273–304)
PLATELET # BLD AUTO: 207 10*3/UL (ref 130–400)
PMV BLD AUTO: 10.7 FL (ref 9.4–12.3)
POTASSIUM SERPL-SCNC: 3.8 MMOL/L (ref 3.5–5.3)
RBC # BLD AUTO: 4.91 10*6/UL (ref 4.2–5.4)
SODIUM SERPL-SCNC: 139 MMOL/L (ref 135–147)
T4 FREE SERPL-MCNC: 1.1 NG/DL (ref 0.9–1.8)
TIBC SERPL-MCNC: 408 UG/DL (ref 245–450)
TRANSFERRIN SERPL-MCNC: 285 MG/DL (ref 250–380)
TRIGL SERPL-MCNC: 106 MG/DL (ref 40–150)
TSH SERPL-ACNC: 2.34 M[IU]/L (ref 0.27–4.2)
URATE SERPL-MCNC: 5.7 MG/DL (ref 2.5–7.5)
VIT B12 SERPL-MCNC: 513 PG/ML (ref 211–911)
VLDLC SERPL-MCNC: 21 MG/DL (ref 5–37)
WBC # BLD AUTO: 4.41 10*3/UL (ref 4.8–10.8)

## 2020-09-11 ENCOUNTER — HOSPITAL ENCOUNTER (OUTPATIENT)
Dept: OTHER | Facility: HOSPITAL | Age: 75
Discharge: HOME | End: 2020-09-11
Attending: NURSE PRACTITIONER

## 2020-09-11 LAB
APPEARANCE UR: ABNORMAL
BILIRUB UR QL: NEGATIVE
COLOR UR: YELLOW
CONV BACTERIA: ABNORMAL
CONV COLLECTION SOURCE (UA): ABNORMAL
CONV HYALINE CASTS IN URINE MICRO: ABNORMAL /[LPF]
CONV UROBILINOGEN IN URINE BY AUTOMATED TEST STRIP: 0.2 {EHRLICHU}/DL (ref 0.1–1)
GLUCOSE UR QL: NEGATIVE MG/DL
HGB UR QL STRIP: ABNORMAL
KETONES UR QL STRIP: NEGATIVE MG/DL
LEUKOCYTE ESTERASE UR QL STRIP: ABNORMAL
NITRITE UR QL STRIP: NEGATIVE
PH UR STRIP.AUTO: 6.5 [PH] (ref 5–8)
PROT UR QL: ABNORMAL MG/DL
RBC #/AREA URNS HPF: ABNORMAL /[HPF]
SP GR UR: 1.02 (ref 1–1.03)
SQUAMOUS SPT QL MICRO: ABNORMAL /[HPF]
WBC #/AREA URNS HPF: ABNORMAL /[HPF]

## 2020-09-14 LAB
BACTERIA UR CULT: ABNORMAL
CIPROFLOXACIN SUSC ISLT: <=0.5
DAPTOMYCIN SUSC ISLT: 0.5
DOXYCYCLINE SUSC ISLT: <=0.5
ERYTHROMYCIN SUSC ISLT: >=8
GENTAMICIN SUSC ISLT: <=0.5
LEVOFLOXACIN SUSC ISLT: 0.25
NITROFURANTOIN SUSC ISLT: <=16
OXACILLIN SUSC ISLT: <=0.25
RIFAMPIN SUSC ISLT: <=0.5
TETRACYCLINE SUSC ISLT: <=1
TIGECYCLINE SUSC ISLT: <=0.12
TMP SMX SUSC ISLT: <=10
VANCOMYCIN SUSC ISLT: <=0.5

## 2020-12-03 ENCOUNTER — HOSPITAL ENCOUNTER (OUTPATIENT)
Dept: LAB | Facility: HOSPITAL | Age: 75
Discharge: HOME | End: 2020-12-03
Attending: NURSE PRACTITIONER

## 2020-12-03 LAB
25(OH)D3 SERPL-MCNC: 37.1 NG/ML (ref 30–100)
ALBUMIN SERPL-MCNC: 4.3 G/DL (ref 3.5–5)
ALBUMIN/GLOB SERPL: 1.7 {RATIO} (ref 1.4–2.6)
ALP SERPL-CCNC: 38 U/L (ref 43–160)
ALT SERPL-CCNC: 16 U/L (ref 10–40)
ANION GAP SERPL CALC-SCNC: 17 MMOL/L (ref 8–19)
AST SERPL-CCNC: 29 U/L (ref 15–50)
BASOPHILS # BLD AUTO: 0.05 10*3/UL (ref 0–0.2)
BASOPHILS NFR BLD AUTO: 1.1 % (ref 0–3)
BILIRUB SERPL-MCNC: 0.49 MG/DL (ref 0.2–1.3)
BUN SERPL-MCNC: 28 MG/DL (ref 5–25)
BUN/CREAT SERPL: 25 {RATIO} (ref 6–20)
CALCIUM SERPL-MCNC: 10 MG/DL (ref 8.7–10.4)
CHLORIDE SERPL-SCNC: 102 MMOL/L (ref 99–111)
CHOLEST SERPL-MCNC: 136 MG/DL (ref 107–200)
CHOLEST/HDLC SERPL: 2.4 {RATIO} (ref 3–6)
CONV ABS IMM GRAN: 0.01 10*3/UL (ref 0–0.2)
CONV CO2: 26 MMOL/L (ref 22–32)
CONV CREATININE URINE, RANDOM: 57.9 MG/DL (ref 10–300)
CONV IMMATURE GRAN: 0.2 % (ref 0–1.8)
CONV MICROALBUM.,U,RANDOM: <12 MG/L (ref 0–20)
CONV TOTAL PROTEIN: 6.9 G/DL (ref 6.3–8.2)
CREAT UR-MCNC: 1.11 MG/DL (ref 0.5–0.9)
DEPRECATED RDW RBC AUTO: 47.4 FL (ref 36.4–46.3)
EOSINOPHIL # BLD AUTO: 0.34 10*3/UL (ref 0–0.7)
EOSINOPHIL # BLD AUTO: 7.4 % (ref 0–7)
ERYTHROCYTE [DISTWIDTH] IN BLOOD BY AUTOMATED COUNT: 13.8 % (ref 11.7–14.4)
EST. AVERAGE GLUCOSE BLD GHB EST-MCNC: 108 MG/DL
GFR SERPLBLD BASED ON 1.73 SQ M-ARVRAT: 49 ML/MIN/{1.73_M2}
GLOBULIN UR ELPH-MCNC: 2.6 G/DL (ref 2–3.5)
GLUCOSE SERPL-MCNC: 88 MG/DL (ref 65–99)
HBA1C MFR BLD: 5.4 % (ref 3.5–5.7)
HCT VFR BLD AUTO: 46.4 % (ref 37–47)
HDLC SERPL-MCNC: 56 MG/DL (ref 40–60)
HGB BLD-MCNC: 15 G/DL (ref 12–16)
IRON SATN MFR SERPL: 20 % (ref 20–55)
IRON SERPL-MCNC: 80 UG/DL (ref 60–170)
LDLC SERPL CALC-MCNC: 62 MG/DL (ref 70–100)
LYMPHOCYTES # BLD AUTO: 1.54 10*3/UL (ref 1–5)
LYMPHOCYTES NFR BLD AUTO: 33.6 % (ref 20–45)
MCH RBC QN AUTO: 30.3 PG (ref 27–31)
MCHC RBC AUTO-ENTMCNC: 32.3 G/DL (ref 33–37)
MCV RBC AUTO: 93.7 FL (ref 81–99)
MICROALBUMIN/CREAT UR: 20.7 MG/G{CRE} (ref 0–35)
MONOCYTES # BLD AUTO: 0.4 10*3/UL (ref 0.2–1.2)
MONOCYTES NFR BLD AUTO: 8.7 % (ref 3–10)
NEUTROPHILS # BLD AUTO: 2.24 10*3/UL (ref 2–8)
NEUTROPHILS NFR BLD AUTO: 49 % (ref 30–85)
NRBC CBCN: 0 % (ref 0–0.7)
OSMOLALITY SERPL CALC.SUM OF ELEC: 297 MOSM/KG (ref 273–304)
PLATELET # BLD AUTO: 221 10*3/UL (ref 130–400)
PMV BLD AUTO: 10.6 FL (ref 9.4–12.3)
POTASSIUM SERPL-SCNC: 3.9 MMOL/L (ref 3.5–5.3)
RBC # BLD AUTO: 4.95 10*6/UL (ref 4.2–5.4)
SODIUM SERPL-SCNC: 141 MMOL/L (ref 135–147)
T4 FREE SERPL-MCNC: 1.2 NG/DL (ref 0.9–1.8)
TIBC SERPL-MCNC: 400 UG/DL (ref 245–450)
TRANSFERRIN SERPL-MCNC: 280 MG/DL (ref 250–380)
TRIGL SERPL-MCNC: 91 MG/DL (ref 40–150)
TSH SERPL-ACNC: 2.21 M[IU]/L (ref 0.27–4.2)
VLDLC SERPL-MCNC: 18 MG/DL (ref 5–37)
WBC # BLD AUTO: 4.58 10*3/UL (ref 4.8–10.8)

## 2020-12-03 PROCEDURE — 82607 VITAMIN B-12: CPT

## 2020-12-03 PROCEDURE — 82746 ASSAY OF FOLIC ACID SERUM: CPT

## 2020-12-04 ENCOUNTER — LAB REQUISITION (OUTPATIENT)
Dept: LAB | Facility: HOSPITAL | Age: 75
End: 2020-12-04

## 2020-12-04 DIAGNOSIS — Z00.00 ROUTINE GENERAL MEDICAL EXAMINATION AT A HEALTH CARE FACILITY: ICD-10-CM

## 2020-12-04 LAB
FOLATE SERPL-MCNC: 12.7 NG/ML (ref 4.78–24.2)
URATE SERPL-MCNC: 5.1 MG/DL (ref 2.5–7.5)
VIT B12 BLD-MCNC: 446 PG/ML (ref 211–946)

## 2020-12-15 ENCOUNTER — HOSPITAL ENCOUNTER (OUTPATIENT)
Dept: OTHER | Facility: HOSPITAL | Age: 75
Discharge: HOME | End: 2020-12-15
Attending: NURSE PRACTITIONER

## 2020-12-18 LAB
CONV LAST MENSTURAL PERIOD: NORMAL
SPECIMEN SOURCE: NORMAL
SPECIMEN SOURCE: NORMAL
THIN PREP CVX: NORMAL

## 2021-04-05 ENCOUNTER — HOSPITAL ENCOUNTER (OUTPATIENT)
Dept: LAB | Facility: HOSPITAL | Age: 76
Discharge: HOME | End: 2021-04-05
Attending: NURSE PRACTITIONER

## 2021-04-05 LAB
25(OH)D3 SERPL-MCNC: 36.3 NG/ML (ref 30–100)
ALBUMIN SERPL-MCNC: 4.2 G/DL (ref 3.5–5)
ALBUMIN/GLOB SERPL: 1.6 {RATIO} (ref 1.4–2.6)
ALP SERPL-CCNC: 37 U/L (ref 43–160)
ALT SERPL-CCNC: 12 U/L (ref 10–40)
ANION GAP SERPL CALC-SCNC: 14 MMOL/L (ref 8–19)
AST SERPL-CCNC: 23 U/L (ref 15–50)
BASOPHILS # BLD AUTO: 0.04 10*3/UL (ref 0–0.2)
BASOPHILS NFR BLD AUTO: 0.9 % (ref 0–3)
BILIRUB SERPL-MCNC: 0.45 MG/DL (ref 0.2–1.3)
BUN SERPL-MCNC: 29 MG/DL (ref 5–25)
BUN/CREAT SERPL: 28 {RATIO} (ref 6–20)
CALCIUM SERPL-MCNC: 9.6 MG/DL (ref 8.7–10.4)
CHLORIDE SERPL-SCNC: 103 MMOL/L (ref 99–111)
CHOLEST SERPL-MCNC: 138 MG/DL (ref 107–200)
CHOLEST/HDLC SERPL: 2.3 {RATIO} (ref 3–6)
CONV ABS IMM GRAN: 0.02 10*3/UL (ref 0–0.2)
CONV CO2: 28 MMOL/L (ref 22–32)
CONV CREATININE URINE, RANDOM: 54.7 MG/DL (ref 10–300)
CONV IMMATURE GRAN: 0.5 % (ref 0–1.8)
CONV MICROALBUM.,U,RANDOM: <12 MG/L (ref 0–20)
CONV TOTAL PROTEIN: 6.9 G/DL (ref 6.3–8.2)
CREAT UR-MCNC: 1.03 MG/DL (ref 0.5–0.9)
DEPRECATED RDW RBC AUTO: 49 FL (ref 36.4–46.3)
EOSINOPHIL # BLD AUTO: 0.31 10*3/UL (ref 0–0.7)
EOSINOPHIL # BLD AUTO: 7.2 % (ref 0–7)
ERYTHROCYTE [DISTWIDTH] IN BLOOD BY AUTOMATED COUNT: 14 % (ref 11.7–14.4)
EST. AVERAGE GLUCOSE BLD GHB EST-MCNC: 103 MG/DL
FOLATE SERPL-MCNC: 14.9 NG/ML (ref 4.8–20)
GFR SERPLBLD BASED ON 1.73 SQ M-ARVRAT: 54 ML/MIN/{1.73_M2}
GLOBULIN UR ELPH-MCNC: 2.7 G/DL (ref 2–3.5)
GLUCOSE SERPL-MCNC: 89 MG/DL (ref 65–99)
HBA1C MFR BLD: 5.2 % (ref 3.5–5.7)
HCT VFR BLD AUTO: 48.1 % (ref 37–47)
HDLC SERPL-MCNC: 60 MG/DL (ref 40–60)
HGB BLD-MCNC: 15.2 G/DL (ref 12–16)
IRON SATN MFR SERPL: 25 % (ref 20–55)
IRON SERPL-MCNC: 95 UG/DL (ref 60–170)
LDLC SERPL CALC-MCNC: 58 MG/DL (ref 70–100)
LYMPHOCYTES # BLD AUTO: 1.57 10*3/UL (ref 1–5)
LYMPHOCYTES NFR BLD AUTO: 36.5 % (ref 20–45)
MCH RBC QN AUTO: 30.1 PG (ref 27–31)
MCHC RBC AUTO-ENTMCNC: 31.6 G/DL (ref 33–37)
MCV RBC AUTO: 95.2 FL (ref 81–99)
MICROALBUMIN/CREAT UR: 21.9 MG/G{CRE} (ref 0–35)
MONOCYTES # BLD AUTO: 0.36 10*3/UL (ref 0.2–1.2)
MONOCYTES NFR BLD AUTO: 8.4 % (ref 3–10)
NEUTROPHILS # BLD AUTO: 2 10*3/UL (ref 2–8)
NEUTROPHILS NFR BLD AUTO: 46.5 % (ref 30–85)
NRBC CBCN: 0 % (ref 0–0.7)
OSMOLALITY SERPL CALC.SUM OF ELEC: 297 MOSM/KG (ref 273–304)
PLATELET # BLD AUTO: 217 10*3/UL (ref 130–400)
PMV BLD AUTO: 10.8 FL (ref 9.4–12.3)
POTASSIUM SERPL-SCNC: 4 MMOL/L (ref 3.5–5.3)
RBC # BLD AUTO: 5.05 10*6/UL (ref 4.2–5.4)
SODIUM SERPL-SCNC: 141 MMOL/L (ref 135–147)
T4 FREE SERPL-MCNC: 1.1 NG/DL (ref 0.9–1.8)
TIBC SERPL-MCNC: 378 UG/DL (ref 245–450)
TRANSFERRIN SERPL-MCNC: 264 MG/DL (ref 250–380)
TRIGL SERPL-MCNC: 98 MG/DL (ref 40–150)
TSH SERPL-ACNC: 2.02 M[IU]/L (ref 0.27–4.2)
URATE SERPL-MCNC: 4.9 MG/DL (ref 2.5–7.5)
VIT B12 SERPL-MCNC: 729 PG/ML (ref 211–911)
VLDLC SERPL-MCNC: 20 MG/DL (ref 5–37)
WBC # BLD AUTO: 4.3 10*3/UL (ref 4.8–10.8)

## 2021-04-13 ENCOUNTER — HOSPITAL ENCOUNTER (OUTPATIENT)
Dept: GENERAL RADIOLOGY | Facility: HOSPITAL | Age: 76
Discharge: HOME | End: 2021-04-13
Attending: NURSE PRACTITIONER

## 2021-07-01 ENCOUNTER — LAB (OUTPATIENT)
Dept: LAB | Facility: HOSPITAL | Age: 76
End: 2021-07-01

## 2021-07-01 ENCOUNTER — TRANSCRIBE ORDERS (OUTPATIENT)
Dept: LAB | Facility: HOSPITAL | Age: 76
End: 2021-07-01

## 2021-07-01 DIAGNOSIS — I10 HYPERTENSION, ESSENTIAL: ICD-10-CM

## 2021-07-01 DIAGNOSIS — R73.01 IMPAIRED FASTING GLUCOSE: ICD-10-CM

## 2021-07-01 DIAGNOSIS — D50.9 IRON DEFICIENCY ANEMIA, UNSPECIFIED IRON DEFICIENCY ANEMIA TYPE: ICD-10-CM

## 2021-07-01 DIAGNOSIS — E78.5 HYPERLIPIDEMIA, UNSPECIFIED HYPERLIPIDEMIA TYPE: Primary | ICD-10-CM

## 2021-07-01 DIAGNOSIS — J45.909 ALLERGIC ASTHMA WITH STATED CAUSE: ICD-10-CM

## 2021-07-01 DIAGNOSIS — E66.01 CLASS 3 SEVERE OBESITY IN ADULT, UNSPECIFIED BMI, UNSPECIFIED OBESITY TYPE, UNSPECIFIED WHETHER SERIOUS COMORBIDITY PRESENT (HCC): ICD-10-CM

## 2021-07-01 DIAGNOSIS — E55.9 VITAMIN D DEFICIENCY DISEASE: ICD-10-CM

## 2021-07-01 DIAGNOSIS — R53.83 TIREDNESS: ICD-10-CM

## 2021-07-01 DIAGNOSIS — E78.5 HYPERLIPIDEMIA, UNSPECIFIED HYPERLIPIDEMIA TYPE: ICD-10-CM

## 2021-07-01 DIAGNOSIS — M81.8 IDIOPATHIC OSTEOPOROSIS: ICD-10-CM

## 2021-07-01 DIAGNOSIS — N18.30 STAGE 3 CHRONIC RENAL IMPAIRMENT ASSOCIATED WITH TYPE 2 DIABETES MELLITUS (HCC): Primary | ICD-10-CM

## 2021-07-01 DIAGNOSIS — E11.22 DIABETES MELLITUS WITH STAGE 4 CHRONIC KIDNEY DISEASE (HCC): ICD-10-CM

## 2021-07-01 DIAGNOSIS — N18.30 STAGE 3 CHRONIC RENAL IMPAIRMENT ASSOCIATED WITH TYPE 2 DIABETES MELLITUS (HCC): ICD-10-CM

## 2021-07-01 DIAGNOSIS — K59.09 OTHER CONSTIPATION: ICD-10-CM

## 2021-07-01 DIAGNOSIS — N18.4 DIABETES MELLITUS WITH STAGE 4 CHRONIC KIDNEY DISEASE (HCC): ICD-10-CM

## 2021-07-01 DIAGNOSIS — E11.22 STAGE 3 CHRONIC RENAL IMPAIRMENT ASSOCIATED WITH TYPE 2 DIABETES MELLITUS (HCC): Primary | ICD-10-CM

## 2021-07-01 DIAGNOSIS — E11.22 STAGE 3 CHRONIC RENAL IMPAIRMENT ASSOCIATED WITH TYPE 2 DIABETES MELLITUS (HCC): ICD-10-CM

## 2021-07-01 LAB
25(OH)D3 SERPL-MCNC: 46.6 NG/ML (ref 30–100)
ALBUMIN SERPL-MCNC: 4.4 G/DL (ref 3.5–5.2)
ALBUMIN UR-MCNC: <1.2 MG/DL
ALBUMIN/GLOB SERPL: 2 G/DL
ALP SERPL-CCNC: 34 U/L (ref 39–117)
ALT SERPL W P-5'-P-CCNC: 18 U/L (ref 1–33)
ANION GAP SERPL CALCULATED.3IONS-SCNC: 10.6 MMOL/L (ref 5–15)
AST SERPL-CCNC: 24 U/L (ref 1–32)
BACTERIA UR QL AUTO: ABNORMAL /HPF
BASOPHILS # BLD AUTO: 0.05 10*3/MM3 (ref 0–0.2)
BASOPHILS NFR BLD AUTO: 0.9 % (ref 0–1.5)
BILIRUB SERPL-MCNC: 0.6 MG/DL (ref 0–1.2)
BILIRUB UR QL STRIP: NEGATIVE
BUN SERPL-MCNC: 27 MG/DL (ref 8–23)
BUN/CREAT SERPL: 21.4 (ref 7–25)
CALCIUM SPEC-SCNC: 9.7 MG/DL (ref 8.6–10.5)
CHLORIDE SERPL-SCNC: 99 MMOL/L (ref 98–107)
CHOLEST SERPL-MCNC: 151 MG/DL (ref 0–200)
CLARITY UR: CLEAR
CO2 SERPL-SCNC: 26.4 MMOL/L (ref 22–29)
COLOR UR: YELLOW
CREAT SERPL-MCNC: 1.26 MG/DL (ref 0.57–1)
CREAT UR-MCNC: 66.2 MG/DL
DEPRECATED RDW RBC AUTO: 43.8 FL (ref 37–54)
EOSINOPHIL # BLD AUTO: 0.24 10*3/MM3 (ref 0–0.4)
EOSINOPHIL NFR BLD AUTO: 4.3 % (ref 0.3–6.2)
ERYTHROCYTE [DISTWIDTH] IN BLOOD BY AUTOMATED COUNT: 13.1 % (ref 12.3–15.4)
FOLATE SERPL-MCNC: 12.1 NG/ML (ref 4.78–24.2)
GFR SERPL CREATININE-BSD FRML MDRD: 42 ML/MIN/1.73
GLOBULIN UR ELPH-MCNC: 2.2 GM/DL
GLUCOSE SERPL-MCNC: 82 MG/DL (ref 65–99)
GLUCOSE UR STRIP-MCNC: NEGATIVE MG/DL
HBA1C MFR BLD: 5.38 % (ref 4.8–5.6)
HCT VFR BLD AUTO: 44.1 % (ref 34–46.6)
HDLC SERPL-MCNC: 62 MG/DL (ref 40–60)
HGB BLD-MCNC: 14.8 G/DL (ref 12–15.9)
HGB UR QL STRIP.AUTO: NEGATIVE
HYALINE CASTS UR QL AUTO: ABNORMAL /LPF
IMM GRANULOCYTES # BLD AUTO: 0.01 10*3/MM3 (ref 0–0.05)
IMM GRANULOCYTES NFR BLD AUTO: 0.2 % (ref 0–0.5)
IRON 24H UR-MRATE: 110 MCG/DL (ref 37–145)
IRON SATN MFR SERPL: 27 % (ref 20–50)
KETONES UR QL STRIP: NEGATIVE
LDLC SERPL CALC-MCNC: 74 MG/DL (ref 0–100)
LDLC/HDLC SERPL: 1.19 {RATIO}
LEUKOCYTE ESTERASE UR QL STRIP.AUTO: ABNORMAL
LYMPHOCYTES # BLD AUTO: 1.71 10*3/MM3 (ref 0.7–3.1)
LYMPHOCYTES NFR BLD AUTO: 30.8 % (ref 19.6–45.3)
MCH RBC QN AUTO: 30.6 PG (ref 26.6–33)
MCHC RBC AUTO-ENTMCNC: 33.6 G/DL (ref 31.5–35.7)
MCV RBC AUTO: 91.1 FL (ref 79–97)
MONOCYTES # BLD AUTO: 0.44 10*3/MM3 (ref 0.1–0.9)
MONOCYTES NFR BLD AUTO: 7.9 % (ref 5–12)
NEUTROPHILS NFR BLD AUTO: 3.1 10*3/MM3 (ref 1.7–7)
NEUTROPHILS NFR BLD AUTO: 55.9 % (ref 42.7–76)
NITRITE UR QL STRIP: NEGATIVE
NRBC BLD AUTO-RTO: 0 /100 WBC (ref 0–0.2)
PH UR STRIP.AUTO: 7 [PH] (ref 5–8)
PHOSPHATE SERPL-MCNC: 3.5 MG/DL (ref 2.5–4.5)
PLATELET # BLD AUTO: 221 10*3/MM3 (ref 140–450)
PMV BLD AUTO: 10.6 FL (ref 6–12)
POTASSIUM SERPL-SCNC: 3.9 MMOL/L (ref 3.5–5.2)
PROT SERPL-MCNC: 6.6 G/DL (ref 6–8.5)
PROT UR QL STRIP: NEGATIVE
PROT UR-MCNC: 7.8 MG/DL
PROT/CREAT UR: 0.1 MG/G{CREAT}
RBC # BLD AUTO: 4.84 10*6/MM3 (ref 3.77–5.28)
RBC # UR: ABNORMAL /HPF
REF LAB TEST METHOD: ABNORMAL
SODIUM SERPL-SCNC: 136 MMOL/L (ref 136–145)
SP GR UR STRIP: 1.01 (ref 1–1.03)
SQUAMOUS #/AREA URNS HPF: ABNORMAL /HPF
T4 FREE SERPL-MCNC: 1.08 NG/DL (ref 0.93–1.7)
TIBC SERPL-MCNC: 414 MCG/DL (ref 298–536)
TRANSFERRIN SERPL-MCNC: 278 MG/DL (ref 200–360)
TRIGL SERPL-MCNC: 75 MG/DL (ref 0–150)
TSH SERPL DL<=0.05 MIU/L-ACNC: 2.41 UIU/ML (ref 0.27–4.2)
URATE SERPL-MCNC: 5 MG/DL (ref 2.4–5.7)
UROBILINOGEN UR QL STRIP: ABNORMAL
VIT B12 BLD-MCNC: 607 PG/ML (ref 211–946)
VLDLC SERPL-MCNC: 15 MG/DL (ref 5–40)
WBC # BLD AUTO: 5.55 10*3/MM3 (ref 3.4–10.8)
WBC UR QL AUTO: ABNORMAL /HPF

## 2021-07-01 PROCEDURE — 84443 ASSAY THYROID STIM HORMONE: CPT

## 2021-07-01 PROCEDURE — 84100 ASSAY OF PHOSPHORUS: CPT

## 2021-07-01 PROCEDURE — 85025 COMPLETE CBC W/AUTO DIFF WBC: CPT

## 2021-07-01 PROCEDURE — 82570 ASSAY OF URINE CREATININE: CPT

## 2021-07-01 PROCEDURE — 82043 UR ALBUMIN QUANTITATIVE: CPT

## 2021-07-01 PROCEDURE — 84439 ASSAY OF FREE THYROXINE: CPT

## 2021-07-01 PROCEDURE — 84156 ASSAY OF PROTEIN URINE: CPT

## 2021-07-01 PROCEDURE — 81001 URINALYSIS AUTO W/SCOPE: CPT

## 2021-07-01 PROCEDURE — 82607 VITAMIN B-12: CPT

## 2021-07-01 PROCEDURE — 83036 HEMOGLOBIN GLYCOSYLATED A1C: CPT

## 2021-07-01 PROCEDURE — 80061 LIPID PANEL: CPT

## 2021-07-01 PROCEDURE — 36415 COLL VENOUS BLD VENIPUNCTURE: CPT

## 2021-07-01 PROCEDURE — 84550 ASSAY OF BLOOD/URIC ACID: CPT

## 2021-07-01 PROCEDURE — 82746 ASSAY OF FOLIC ACID SERUM: CPT

## 2021-07-01 PROCEDURE — 84466 ASSAY OF TRANSFERRIN: CPT

## 2021-07-01 PROCEDURE — 80053 COMPREHEN METABOLIC PANEL: CPT

## 2021-07-01 PROCEDURE — 83540 ASSAY OF IRON: CPT

## 2021-07-01 PROCEDURE — 82306 VITAMIN D 25 HYDROXY: CPT

## 2021-07-07 ENCOUNTER — LAB REQUISITION (OUTPATIENT)
Dept: LAB | Facility: HOSPITAL | Age: 76
End: 2021-07-07

## 2021-07-07 DIAGNOSIS — N39.0 URINARY TRACT INFECTION, SITE NOT SPECIFIED: ICD-10-CM

## 2021-07-07 PROCEDURE — 87086 URINE CULTURE/COLONY COUNT: CPT | Performed by: NURSE PRACTITIONER

## 2021-07-07 PROCEDURE — 87077 CULTURE AEROBIC IDENTIFY: CPT | Performed by: NURSE PRACTITIONER

## 2021-07-07 PROCEDURE — 81001 URINALYSIS AUTO W/SCOPE: CPT | Performed by: NURSE PRACTITIONER

## 2021-07-07 PROCEDURE — 87186 SC STD MICRODIL/AGAR DIL: CPT | Performed by: NURSE PRACTITIONER

## 2021-07-08 LAB
AMORPH URATE CRY URNS QL MICRO: ABNORMAL /HPF
BACTERIA SPEC AEROBE CULT: NORMAL
BACTERIA UR QL AUTO: ABNORMAL /HPF
BILIRUB UR QL STRIP: NEGATIVE
CLARITY UR: ABNORMAL
COLOR UR: YELLOW
GLUCOSE UR STRIP-MCNC: NEGATIVE MG/DL
HGB UR QL STRIP.AUTO: ABNORMAL
HYALINE CASTS UR QL AUTO: ABNORMAL /LPF
KETONES UR QL STRIP: NEGATIVE
LEUKOCYTE ESTERASE UR QL STRIP.AUTO: ABNORMAL
NITRITE UR QL STRIP: POSITIVE
PH UR STRIP.AUTO: 7.5 [PH] (ref 5–8)
PROT UR QL STRIP: ABNORMAL
RBC # UR: ABNORMAL /HPF
REF LAB TEST METHOD: ABNORMAL
SP GR UR STRIP: 1.01 (ref 1–1.03)
SQUAMOUS #/AREA URNS HPF: ABNORMAL /HPF
TRI-PHOS CRY URNS QL MICRO: ABNORMAL /HPF
UROBILINOGEN UR QL STRIP: ABNORMAL
WBC UR QL AUTO: ABNORMAL /HPF

## 2021-10-01 ENCOUNTER — TRANSCRIBE ORDERS (OUTPATIENT)
Dept: LAB | Facility: HOSPITAL | Age: 76
End: 2021-10-01

## 2021-10-01 ENCOUNTER — LAB (OUTPATIENT)
Dept: LAB | Facility: HOSPITAL | Age: 76
End: 2021-10-01

## 2021-10-01 DIAGNOSIS — E55.9 AVITAMINOSIS D: ICD-10-CM

## 2021-10-01 DIAGNOSIS — R00.2 PALPITATIONS: ICD-10-CM

## 2021-10-01 DIAGNOSIS — E11.22 TYPE 2 DIABETES MELLITUS WITH ESRD (END-STAGE RENAL DISEASE) (HCC): ICD-10-CM

## 2021-10-01 DIAGNOSIS — R00.2 PALPITATIONS: Primary | ICD-10-CM

## 2021-10-01 DIAGNOSIS — M81.8 IDIOPATHIC OSTEOPOROSIS: ICD-10-CM

## 2021-10-01 DIAGNOSIS — J30.89 PERENNIAL ALLERGIC RHINITIS: ICD-10-CM

## 2021-10-01 DIAGNOSIS — I10 ESSENTIAL HYPERTENSION, MALIGNANT: ICD-10-CM

## 2021-10-01 DIAGNOSIS — E78.5 HYPERLIPIDEMIA, UNSPECIFIED HYPERLIPIDEMIA TYPE: ICD-10-CM

## 2021-10-01 DIAGNOSIS — D50.9 IRON DEFICIENCY ANEMIA, UNSPECIFIED IRON DEFICIENCY ANEMIA TYPE: ICD-10-CM

## 2021-10-01 DIAGNOSIS — N18.6 TYPE 2 DIABETES MELLITUS WITH ESRD (END-STAGE RENAL DISEASE) (HCC): ICD-10-CM

## 2021-10-01 DIAGNOSIS — R73.01 IMPAIRED FASTING GLUCOSE: ICD-10-CM

## 2021-10-01 LAB
25(OH)D3 SERPL-MCNC: 42.3 NG/ML (ref 30–100)
ALBUMIN SERPL-MCNC: 4.3 G/DL (ref 3.5–5.2)
ALBUMIN UR-MCNC: <1.2 MG/DL
ALBUMIN/GLOB SERPL: 1.7 G/DL
ALP SERPL-CCNC: 33 U/L (ref 39–117)
ALT SERPL W P-5'-P-CCNC: 17 U/L (ref 1–33)
ANION GAP SERPL CALCULATED.3IONS-SCNC: 9.1 MMOL/L (ref 5–15)
AST SERPL-CCNC: 24 U/L (ref 1–32)
BASOPHILS # BLD AUTO: 0.03 10*3/MM3 (ref 0–0.2)
BASOPHILS NFR BLD AUTO: 0.7 % (ref 0–1.5)
BILIRUB SERPL-MCNC: 0.3 MG/DL (ref 0–1.2)
BUN SERPL-MCNC: 20 MG/DL (ref 8–23)
BUN/CREAT SERPL: 22 (ref 7–25)
CALCIUM SPEC-SCNC: 9.7 MG/DL (ref 8.6–10.5)
CHLORIDE SERPL-SCNC: 104 MMOL/L (ref 98–107)
CHOLEST SERPL-MCNC: 161 MG/DL (ref 0–200)
CO2 SERPL-SCNC: 25.9 MMOL/L (ref 22–29)
CREAT SERPL-MCNC: 0.91 MG/DL (ref 0.57–1)
DEPRECATED RDW RBC AUTO: 44 FL (ref 37–54)
EOSINOPHIL # BLD AUTO: 0.17 10*3/MM3 (ref 0–0.4)
EOSINOPHIL NFR BLD AUTO: 4 % (ref 0.3–6.2)
ERYTHROCYTE [DISTWIDTH] IN BLOOD BY AUTOMATED COUNT: 12.8 % (ref 12.3–15.4)
FOLATE SERPL-MCNC: 5.59 NG/ML (ref 4.78–24.2)
GFR SERPL CREATININE-BSD FRML MDRD: 61 ML/MIN/1.73
GLOBULIN UR ELPH-MCNC: 2.5 GM/DL
GLUCOSE SERPL-MCNC: 79 MG/DL (ref 65–99)
HBA1C MFR BLD: 5.42 % (ref 4.8–5.6)
HCT VFR BLD AUTO: 45.9 % (ref 34–46.6)
HDLC SERPL-MCNC: 57 MG/DL (ref 40–60)
HGB BLD-MCNC: 14.9 G/DL (ref 12–15.9)
IMM GRANULOCYTES # BLD AUTO: 0.02 10*3/MM3 (ref 0–0.05)
IMM GRANULOCYTES NFR BLD AUTO: 0.5 % (ref 0–0.5)
IRON 24H UR-MRATE: 68 MCG/DL (ref 37–145)
IRON SATN MFR SERPL: 19 % (ref 20–50)
LDLC SERPL CALC-MCNC: 88 MG/DL (ref 0–100)
LDLC/HDLC SERPL: 1.53 {RATIO}
LYMPHOCYTES # BLD AUTO: 1.79 10*3/MM3 (ref 0.7–3.1)
LYMPHOCYTES NFR BLD AUTO: 42.5 % (ref 19.6–45.3)
MCH RBC QN AUTO: 30.2 PG (ref 26.6–33)
MCHC RBC AUTO-ENTMCNC: 32.5 G/DL (ref 31.5–35.7)
MCV RBC AUTO: 93.1 FL (ref 79–97)
MONOCYTES # BLD AUTO: 0.36 10*3/MM3 (ref 0.1–0.9)
MONOCYTES NFR BLD AUTO: 8.6 % (ref 5–12)
NEUTROPHILS NFR BLD AUTO: 1.84 10*3/MM3 (ref 1.7–7)
NEUTROPHILS NFR BLD AUTO: 43.7 % (ref 42.7–76)
NRBC BLD AUTO-RTO: 0 /100 WBC (ref 0–0.2)
PLATELET # BLD AUTO: 245 10*3/MM3 (ref 140–450)
PMV BLD AUTO: 10.4 FL (ref 6–12)
POTASSIUM SERPL-SCNC: 4.4 MMOL/L (ref 3.5–5.2)
PROT SERPL-MCNC: 6.8 G/DL (ref 6–8.5)
RBC # BLD AUTO: 4.93 10*6/MM3 (ref 3.77–5.28)
SODIUM SERPL-SCNC: 139 MMOL/L (ref 136–145)
T4 FREE SERPL-MCNC: 1.08 NG/DL (ref 0.93–1.7)
TIBC SERPL-MCNC: 359 MCG/DL (ref 298–536)
TRANSFERRIN SERPL-MCNC: 241 MG/DL (ref 200–360)
TRIGL SERPL-MCNC: 84 MG/DL (ref 0–150)
TSH SERPL DL<=0.05 MIU/L-ACNC: 3.02 UIU/ML (ref 0.27–4.2)
URATE SERPL-MCNC: 3.9 MG/DL (ref 2.4–5.7)
VIT B12 BLD-MCNC: 416 PG/ML (ref 211–946)
VLDLC SERPL-MCNC: 16 MG/DL (ref 5–40)
WBC # BLD AUTO: 4.21 10*3/MM3 (ref 3.4–10.8)

## 2021-10-01 PROCEDURE — 82306 VITAMIN D 25 HYDROXY: CPT

## 2021-10-01 PROCEDURE — 82607 VITAMIN B-12: CPT

## 2021-10-01 PROCEDURE — 84550 ASSAY OF BLOOD/URIC ACID: CPT

## 2021-10-01 PROCEDURE — 83540 ASSAY OF IRON: CPT

## 2021-10-01 PROCEDURE — 82043 UR ALBUMIN QUANTITATIVE: CPT

## 2021-10-01 PROCEDURE — 85025 COMPLETE CBC W/AUTO DIFF WBC: CPT

## 2021-10-01 PROCEDURE — 84439 ASSAY OF FREE THYROXINE: CPT

## 2021-10-01 PROCEDURE — 84443 ASSAY THYROID STIM HORMONE: CPT

## 2021-10-01 PROCEDURE — 80061 LIPID PANEL: CPT

## 2021-10-01 PROCEDURE — 83036 HEMOGLOBIN GLYCOSYLATED A1C: CPT

## 2021-10-01 PROCEDURE — 82746 ASSAY OF FOLIC ACID SERUM: CPT

## 2021-10-01 PROCEDURE — 36415 COLL VENOUS BLD VENIPUNCTURE: CPT

## 2021-10-01 PROCEDURE — 84466 ASSAY OF TRANSFERRIN: CPT

## 2021-10-01 PROCEDURE — 80053 COMPREHEN METABOLIC PANEL: CPT

## 2021-10-22 ENCOUNTER — LAB (OUTPATIENT)
Dept: LAB | Facility: HOSPITAL | Age: 76
End: 2021-10-22

## 2021-10-22 ENCOUNTER — TRANSCRIBE ORDERS (OUTPATIENT)
Dept: LAB | Facility: HOSPITAL | Age: 76
End: 2021-10-22

## 2021-10-22 DIAGNOSIS — R10.84 ABDOMINAL PAIN, GENERALIZED: ICD-10-CM

## 2021-10-22 DIAGNOSIS — R10.84 ABDOMINAL PAIN, GENERALIZED: Primary | ICD-10-CM

## 2021-10-22 LAB
AMYLASE SERPL-CCNC: 38 U/L (ref 28–100)
LIPASE SERPL-CCNC: 39 U/L (ref 13–60)

## 2021-10-22 PROCEDURE — 82150 ASSAY OF AMYLASE: CPT

## 2021-10-22 PROCEDURE — 36415 COLL VENOUS BLD VENIPUNCTURE: CPT

## 2021-10-22 PROCEDURE — 83690 ASSAY OF LIPASE: CPT

## 2022-01-12 ENCOUNTER — TRANSCRIBE ORDERS (OUTPATIENT)
Dept: LAB | Facility: HOSPITAL | Age: 77
End: 2022-01-12

## 2022-01-12 ENCOUNTER — LAB (OUTPATIENT)
Dept: LAB | Facility: HOSPITAL | Age: 77
End: 2022-01-12

## 2022-01-12 DIAGNOSIS — N18.6 TYPE 2 DIABETES MELLITUS WITH ESRD (END-STAGE RENAL DISEASE): ICD-10-CM

## 2022-01-12 DIAGNOSIS — R73.01 IMPAIRED FASTING GLUCOSE: ICD-10-CM

## 2022-01-12 DIAGNOSIS — E78.5 HYPERLIPIDEMIA, UNSPECIFIED HYPERLIPIDEMIA TYPE: ICD-10-CM

## 2022-01-12 DIAGNOSIS — K21.9 GASTROESOPHAGEAL REFLUX DISEASE WITHOUT ESOPHAGITIS: ICD-10-CM

## 2022-01-12 DIAGNOSIS — R10.84 GENERALIZED ABDOMINAL PAIN: ICD-10-CM

## 2022-01-12 DIAGNOSIS — M81.8 OTHER OSTEOPOROSIS WITHOUT CURRENT PATHOLOGICAL FRACTURE: ICD-10-CM

## 2022-01-12 DIAGNOSIS — E11.22 TYPE 2 DIABETES MELLITUS WITH ESRD (END-STAGE RENAL DISEASE): ICD-10-CM

## 2022-01-12 DIAGNOSIS — I10 ESSENTIAL (PRIMARY) HYPERTENSION: ICD-10-CM

## 2022-01-12 DIAGNOSIS — J45.909 UNCOMPLICATED ASTHMA, UNSPECIFIED ASTHMA SEVERITY, UNSPECIFIED WHETHER PERSISTENT: ICD-10-CM

## 2022-01-12 DIAGNOSIS — J30.89 OTHER ALLERGIC RHINITIS: ICD-10-CM

## 2022-01-12 DIAGNOSIS — E55.9 VITAMIN D DEFICIENCY: ICD-10-CM

## 2022-01-12 DIAGNOSIS — D50.9 IRON DEFICIENCY ANEMIA, UNSPECIFIED IRON DEFICIENCY ANEMIA TYPE: ICD-10-CM

## 2022-01-12 DIAGNOSIS — R10.84 GENERALIZED ABDOMINAL PAIN: Primary | ICD-10-CM

## 2022-01-12 LAB
25(OH)D3 SERPL-MCNC: 44.6 NG/ML (ref 30–100)
ALBUMIN SERPL-MCNC: 4.1 G/DL (ref 3.5–5.2)
ALBUMIN UR-MCNC: <1.2 MG/DL
ALBUMIN/GLOB SERPL: 1.7 G/DL
ALP SERPL-CCNC: 34 U/L (ref 39–117)
ALT SERPL W P-5'-P-CCNC: 18 U/L (ref 1–33)
AMYLASE SERPL-CCNC: 50 U/L (ref 28–100)
ANION GAP SERPL CALCULATED.3IONS-SCNC: 8 MMOL/L (ref 5–15)
AST SERPL-CCNC: 26 U/L (ref 1–32)
BASOPHILS # BLD AUTO: 0.06 10*3/MM3 (ref 0–0.2)
BASOPHILS NFR BLD AUTO: 1.4 % (ref 0–1.5)
BILIRUB SERPL-MCNC: 0.4 MG/DL (ref 0–1.2)
BUN SERPL-MCNC: 23 MG/DL (ref 8–23)
BUN/CREAT SERPL: 20.9 (ref 7–25)
CALCIUM SPEC-SCNC: 9.4 MG/DL (ref 8.6–10.5)
CHLORIDE SERPL-SCNC: 105 MMOL/L (ref 98–107)
CHOLEST SERPL-MCNC: 152 MG/DL (ref 0–200)
CO2 SERPL-SCNC: 27 MMOL/L (ref 22–29)
CREAT SERPL-MCNC: 1.1 MG/DL (ref 0.57–1)
DEPRECATED RDW RBC AUTO: 43.8 FL (ref 37–54)
EOSINOPHIL # BLD AUTO: 0.54 10*3/MM3 (ref 0–0.4)
EOSINOPHIL NFR BLD AUTO: 12.8 % (ref 0.3–6.2)
ERYTHROCYTE [DISTWIDTH] IN BLOOD BY AUTOMATED COUNT: 13.3 % (ref 12.3–15.4)
FOLATE SERPL-MCNC: 8.46 NG/ML (ref 4.78–24.2)
GFR SERPL CREATININE-BSD FRML MDRD: 49 ML/MIN/1.73
GLOBULIN UR ELPH-MCNC: 2.4 GM/DL
GLUCOSE SERPL-MCNC: 79 MG/DL (ref 65–99)
HBA1C MFR BLD: 5.78 % (ref 4.8–5.6)
HCT VFR BLD AUTO: 43.4 % (ref 34–46.6)
HDLC SERPL-MCNC: 57 MG/DL (ref 40–60)
HGB BLD-MCNC: 14.4 G/DL (ref 12–15.9)
IMM GRANULOCYTES # BLD AUTO: 0.01 10*3/MM3 (ref 0–0.05)
IMM GRANULOCYTES NFR BLD AUTO: 0.2 % (ref 0–0.5)
IRON 24H UR-MRATE: 79 MCG/DL (ref 37–145)
IRON SATN MFR SERPL: 20 % (ref 20–50)
LDLC SERPL CALC-MCNC: 80 MG/DL (ref 0–100)
LDLC/HDLC SERPL: 1.39 {RATIO}
LIPASE SERPL-CCNC: 53 U/L (ref 13–60)
LYMPHOCYTES # BLD AUTO: 1.42 10*3/MM3 (ref 0.7–3.1)
LYMPHOCYTES NFR BLD AUTO: 33.6 % (ref 19.6–45.3)
MCH RBC QN AUTO: 29.9 PG (ref 26.6–33)
MCHC RBC AUTO-ENTMCNC: 33.2 G/DL (ref 31.5–35.7)
MCV RBC AUTO: 90 FL (ref 79–97)
MONOCYTES # BLD AUTO: 0.39 10*3/MM3 (ref 0.1–0.9)
MONOCYTES NFR BLD AUTO: 9.2 % (ref 5–12)
NEUTROPHILS NFR BLD AUTO: 1.8 10*3/MM3 (ref 1.7–7)
NEUTROPHILS NFR BLD AUTO: 42.8 % (ref 42.7–76)
NRBC BLD AUTO-RTO: 0 /100 WBC (ref 0–0.2)
PLATELET # BLD AUTO: 187 10*3/MM3 (ref 140–450)
PMV BLD AUTO: 10.6 FL (ref 6–12)
POTASSIUM SERPL-SCNC: 4 MMOL/L (ref 3.5–5.2)
PROT SERPL-MCNC: 6.5 G/DL (ref 6–8.5)
RBC # BLD AUTO: 4.82 10*6/MM3 (ref 3.77–5.28)
SODIUM SERPL-SCNC: 140 MMOL/L (ref 136–145)
T4 FREE SERPL-MCNC: 1.15 NG/DL (ref 0.93–1.7)
TIBC SERPL-MCNC: 386 MCG/DL (ref 298–536)
TRANSFERRIN SERPL-MCNC: 259 MG/DL (ref 200–360)
TRIGL SERPL-MCNC: 78 MG/DL (ref 0–150)
TSH SERPL DL<=0.05 MIU/L-ACNC: 2.33 UIU/ML (ref 0.27–4.2)
URATE SERPL-MCNC: 4.2 MG/DL (ref 2.4–5.7)
VIT B12 BLD-MCNC: 426 PG/ML (ref 211–946)
VLDLC SERPL-MCNC: 15 MG/DL (ref 5–40)
WBC NRBC COR # BLD: 4.22 10*3/MM3 (ref 3.4–10.8)

## 2022-01-12 PROCEDURE — 82306 VITAMIN D 25 HYDROXY: CPT

## 2022-01-12 PROCEDURE — 83690 ASSAY OF LIPASE: CPT

## 2022-01-12 PROCEDURE — 82607 VITAMIN B-12: CPT

## 2022-01-12 PROCEDURE — 83036 HEMOGLOBIN GLYCOSYLATED A1C: CPT

## 2022-01-12 PROCEDURE — 84466 ASSAY OF TRANSFERRIN: CPT

## 2022-01-12 PROCEDURE — 80053 COMPREHEN METABOLIC PANEL: CPT

## 2022-01-12 PROCEDURE — 84550 ASSAY OF BLOOD/URIC ACID: CPT

## 2022-01-12 PROCEDURE — 82746 ASSAY OF FOLIC ACID SERUM: CPT

## 2022-01-12 PROCEDURE — 82043 UR ALBUMIN QUANTITATIVE: CPT

## 2022-01-12 PROCEDURE — 84443 ASSAY THYROID STIM HORMONE: CPT

## 2022-01-12 PROCEDURE — 80061 LIPID PANEL: CPT

## 2022-01-12 PROCEDURE — 82150 ASSAY OF AMYLASE: CPT

## 2022-01-12 PROCEDURE — 83540 ASSAY OF IRON: CPT

## 2022-01-12 PROCEDURE — 84439 ASSAY OF FREE THYROXINE: CPT

## 2022-01-12 PROCEDURE — 85025 COMPLETE CBC W/AUTO DIFF WBC: CPT

## 2022-01-12 PROCEDURE — 36415 COLL VENOUS BLD VENIPUNCTURE: CPT

## 2022-01-19 ENCOUNTER — TRANSCRIBE ORDERS (OUTPATIENT)
Dept: ADMINISTRATIVE | Facility: HOSPITAL | Age: 77
End: 2022-01-19

## 2022-01-19 DIAGNOSIS — Z12.31 SCREENING MAMMOGRAM, ENCOUNTER FOR: Primary | ICD-10-CM

## 2022-01-20 ENCOUNTER — TRANSCRIBE ORDERS (OUTPATIENT)
Dept: ADMINISTRATIVE | Facility: HOSPITAL | Age: 77
End: 2022-01-20

## 2022-01-20 DIAGNOSIS — Z12.31 SCREENING MAMMOGRAM FOR HIGH-RISK PATIENT: Primary | ICD-10-CM

## 2022-01-20 DIAGNOSIS — M81.8 IDIOPATHIC OSTEOPOROSIS: ICD-10-CM

## 2022-04-04 ENCOUNTER — TELEPHONE (OUTPATIENT)
Dept: OBSTETRICS AND GYNECOLOGY | Facility: CLINIC | Age: 77
End: 2022-04-04

## 2022-04-04 NOTE — TELEPHONE ENCOUNTER
Left voicemail- Patient referred by CICI Finley at [facility]. Patient being referred for rectocele. Patient is a current patient and was last seen in 2021 with . Please schedule first available.

## 2022-04-11 ENCOUNTER — OFFICE VISIT (OUTPATIENT)
Dept: OBSTETRICS AND GYNECOLOGY | Facility: CLINIC | Age: 77
End: 2022-04-11

## 2022-04-11 VITALS — DIASTOLIC BLOOD PRESSURE: 85 MMHG | HEART RATE: 76 BPM | SYSTOLIC BLOOD PRESSURE: 140 MMHG | WEIGHT: 208 LBS

## 2022-04-11 DIAGNOSIS — N81.6 RECTOCELE: Primary | ICD-10-CM

## 2022-04-11 RX ORDER — CETIRIZINE HYDROCHLORIDE 10 MG/1
10 TABLET ORAL DAILY
COMMUNITY

## 2022-04-11 RX ORDER — FENOFIBRATE 145 MG/1
TABLET, COATED ORAL
COMMUNITY
Start: 2022-01-21

## 2022-04-11 RX ORDER — TORSEMIDE 10 MG/1
TABLET ORAL
COMMUNITY
Start: 2022-01-21

## 2022-04-11 RX ORDER — ESTRADIOL 0.1 MG/G
CREAM VAGINAL
COMMUNITY
Start: 2022-03-08

## 2022-04-11 RX ORDER — PSEUDOEPHEDRINE HCL 30 MG
100 TABLET ORAL DAILY
COMMUNITY

## 2022-04-11 RX ORDER — SIMVASTATIN 20 MG
20 TABLET ORAL DAILY
COMMUNITY

## 2022-04-11 RX ORDER — EZETIMIBE 10 MG/1
10 TABLET ORAL DAILY
COMMUNITY

## 2022-04-11 RX ORDER — MULTIVITAMIN WITH IRON
1 TABLET ORAL DAILY
COMMUNITY

## 2022-04-11 NOTE — PROGRESS NOTES
This pt was seen by urogyn and referred to pelvic floor physical therapy but somehow placed on my schedule. I discussed with the pt that I do not perform pelvic floor PT and that I would place a referral to the appropriate specialty. She voiced understanding. Denies any new concerns at today's ov. She thought this was an appt for the PT.

## 2022-04-15 ENCOUNTER — HOSPITAL ENCOUNTER (OUTPATIENT)
Dept: MAMMOGRAPHY | Facility: HOSPITAL | Age: 77
Discharge: HOME | End: 2022-04-15

## 2022-04-15 ENCOUNTER — HOSPITAL ENCOUNTER (OUTPATIENT)
Dept: BONE DENSITY | Facility: HOSPITAL | Age: 77
Discharge: HOME | End: 2022-04-15

## 2022-04-15 ENCOUNTER — LAB (OUTPATIENT)
Dept: LAB | Facility: HOSPITAL | Age: 77
End: 2022-04-15

## 2022-04-15 ENCOUNTER — TRANSCRIBE ORDERS (OUTPATIENT)
Dept: LAB | Facility: HOSPITAL | Age: 77
End: 2022-04-15

## 2022-04-15 DIAGNOSIS — M81.8 IDIOPATHIC OSTEOPOROSIS: ICD-10-CM

## 2022-04-15 DIAGNOSIS — E11.22 TYPE 2 DIABETES MELLITUS WITH ESRD (END-STAGE RENAL DISEASE): ICD-10-CM

## 2022-04-15 DIAGNOSIS — R53.83 OTHER FATIGUE: ICD-10-CM

## 2022-04-15 DIAGNOSIS — Z12.31 SCREENING MAMMOGRAM FOR HIGH-RISK PATIENT: ICD-10-CM

## 2022-04-15 DIAGNOSIS — E78.5 HYPERLIPIDEMIA, UNSPECIFIED HYPERLIPIDEMIA TYPE: ICD-10-CM

## 2022-04-15 DIAGNOSIS — R73.01 IMPAIRED FASTING GLUCOSE: ICD-10-CM

## 2022-04-15 DIAGNOSIS — N18.6 TYPE 2 DIABETES MELLITUS WITH ESRD (END-STAGE RENAL DISEASE): ICD-10-CM

## 2022-04-15 DIAGNOSIS — J45.909 UNCOMPLICATED ASTHMA, UNSPECIFIED ASTHMA SEVERITY, UNSPECIFIED WHETHER PERSISTENT: ICD-10-CM

## 2022-04-15 DIAGNOSIS — I10 ESSENTIAL (PRIMARY) HYPERTENSION: ICD-10-CM

## 2022-04-15 DIAGNOSIS — J30.89 OTHER ALLERGIC RHINITIS: ICD-10-CM

## 2022-04-15 DIAGNOSIS — E55.9 VITAMIN D DEFICIENCY: ICD-10-CM

## 2022-04-15 DIAGNOSIS — I10 ESSENTIAL (PRIMARY) HYPERTENSION: Primary | ICD-10-CM

## 2022-04-15 LAB
25(OH)D3 SERPL-MCNC: 38 NG/ML (ref 30–100)
ALBUMIN SERPL-MCNC: 4.3 G/DL (ref 3.5–5.2)
ALBUMIN/GLOB SERPL: 2 G/DL
ALP SERPL-CCNC: 35 U/L (ref 39–117)
ALT SERPL W P-5'-P-CCNC: 19 U/L (ref 1–33)
ANION GAP SERPL CALCULATED.3IONS-SCNC: 9 MMOL/L (ref 5–15)
AST SERPL-CCNC: 28 U/L (ref 1–32)
BASOPHILS # BLD AUTO: 0.05 10*3/MM3 (ref 0–0.2)
BASOPHILS NFR BLD AUTO: 1.1 % (ref 0–1.5)
BILIRUB SERPL-MCNC: 0.5 MG/DL (ref 0–1.2)
BUN SERPL-MCNC: 19 MG/DL (ref 8–23)
BUN/CREAT SERPL: 23.8 (ref 7–25)
CALCIUM SPEC-SCNC: 9.3 MG/DL (ref 8.6–10.5)
CHLORIDE SERPL-SCNC: 103 MMOL/L (ref 98–107)
CHOLEST SERPL-MCNC: 149 MG/DL (ref 0–200)
CO2 SERPL-SCNC: 27 MMOL/L (ref 22–29)
CREAT SERPL-MCNC: 0.8 MG/DL (ref 0.57–1)
DEPRECATED RDW RBC AUTO: 43.4 FL (ref 37–54)
EGFRCR SERPLBLD CKD-EPI 2021: 77.4 ML/MIN/1.73
EOSINOPHIL # BLD AUTO: 0.22 10*3/MM3 (ref 0–0.4)
EOSINOPHIL NFR BLD AUTO: 4.7 % (ref 0.3–6.2)
ERYTHROCYTE [DISTWIDTH] IN BLOOD BY AUTOMATED COUNT: 13.1 % (ref 12.3–15.4)
FOLATE SERPL-MCNC: 9.07 NG/ML (ref 4.78–24.2)
GLOBULIN UR ELPH-MCNC: 2.1 GM/DL
GLUCOSE SERPL-MCNC: 87 MG/DL (ref 65–99)
HBA1C MFR BLD: 5.4 % (ref 4.8–5.6)
HCT VFR BLD AUTO: 46.4 % (ref 34–46.6)
HDLC SERPL-MCNC: 60 MG/DL (ref 40–60)
HGB BLD-MCNC: 15.2 G/DL (ref 12–15.9)
IMM GRANULOCYTES # BLD AUTO: 0.01 10*3/MM3 (ref 0–0.05)
IMM GRANULOCYTES NFR BLD AUTO: 0.2 % (ref 0–0.5)
IRON 24H UR-MRATE: 89 MCG/DL (ref 37–145)
IRON SATN MFR SERPL: 23 % (ref 20–50)
LDLC SERPL CALC-MCNC: 74 MG/DL (ref 0–100)
LDLC/HDLC SERPL: 1.22 {RATIO}
LYMPHOCYTES # BLD AUTO: 1.63 10*3/MM3 (ref 0.7–3.1)
LYMPHOCYTES NFR BLD AUTO: 34.8 % (ref 19.6–45.3)
MCH RBC QN AUTO: 29.9 PG (ref 26.6–33)
MCHC RBC AUTO-ENTMCNC: 32.8 G/DL (ref 31.5–35.7)
MCV RBC AUTO: 91.3 FL (ref 79–97)
MONOCYTES # BLD AUTO: 0.37 10*3/MM3 (ref 0.1–0.9)
MONOCYTES NFR BLD AUTO: 7.9 % (ref 5–12)
NEUTROPHILS NFR BLD AUTO: 2.4 10*3/MM3 (ref 1.7–7)
NEUTROPHILS NFR BLD AUTO: 51.3 % (ref 42.7–76)
NRBC BLD AUTO-RTO: 0 /100 WBC (ref 0–0.2)
PLATELET # BLD AUTO: 210 10*3/MM3 (ref 140–450)
PMV BLD AUTO: 10.1 FL (ref 6–12)
POTASSIUM SERPL-SCNC: 3.9 MMOL/L (ref 3.5–5.2)
PROT SERPL-MCNC: 6.4 G/DL (ref 6–8.5)
RBC # BLD AUTO: 5.08 10*6/MM3 (ref 3.77–5.28)
SODIUM SERPL-SCNC: 139 MMOL/L (ref 136–145)
T4 FREE SERPL-MCNC: 1.16 NG/DL (ref 0.93–1.7)
TIBC SERPL-MCNC: 395 MCG/DL (ref 298–536)
TRANSFERRIN SERPL-MCNC: 265 MG/DL (ref 200–360)
TRIGL SERPL-MCNC: 80 MG/DL (ref 0–150)
TSH SERPL DL<=0.05 MIU/L-ACNC: 2.18 UIU/ML (ref 0.27–4.2)
URATE SERPL-MCNC: 4.1 MG/DL (ref 2.4–5.7)
VIT B12 BLD-MCNC: 401 PG/ML (ref 211–946)
VLDLC SERPL-MCNC: 15 MG/DL (ref 5–40)
WBC NRBC COR # BLD: 4.68 10*3/MM3 (ref 3.4–10.8)

## 2022-04-15 PROCEDURE — 84466 ASSAY OF TRANSFERRIN: CPT

## 2022-04-15 PROCEDURE — 84550 ASSAY OF BLOOD/URIC ACID: CPT

## 2022-04-15 PROCEDURE — 82746 ASSAY OF FOLIC ACID SERUM: CPT

## 2022-04-15 PROCEDURE — 82607 VITAMIN B-12: CPT

## 2022-04-15 PROCEDURE — 36415 COLL VENOUS BLD VENIPUNCTURE: CPT

## 2022-04-15 PROCEDURE — 82306 VITAMIN D 25 HYDROXY: CPT

## 2022-04-15 PROCEDURE — 84443 ASSAY THYROID STIM HORMONE: CPT

## 2022-04-15 PROCEDURE — 77067 SCR MAMMO BI INCL CAD: CPT

## 2022-04-15 PROCEDURE — 85025 COMPLETE CBC W/AUTO DIFF WBC: CPT

## 2022-04-15 PROCEDURE — 80053 COMPREHEN METABOLIC PANEL: CPT

## 2022-04-15 PROCEDURE — 84439 ASSAY OF FREE THYROXINE: CPT

## 2022-04-15 PROCEDURE — 77080 DXA BONE DENSITY AXIAL: CPT

## 2022-04-15 PROCEDURE — 77063 BREAST TOMOSYNTHESIS BI: CPT

## 2022-04-15 PROCEDURE — 80061 LIPID PANEL: CPT

## 2022-04-15 PROCEDURE — 83036 HEMOGLOBIN GLYCOSYLATED A1C: CPT

## 2022-04-15 PROCEDURE — 83540 ASSAY OF IRON: CPT

## 2022-04-20 ENCOUNTER — APPOINTMENT (OUTPATIENT)
Dept: MAMMOGRAPHY | Facility: HOSPITAL | Age: 77
End: 2022-04-20

## 2022-05-12 ENCOUNTER — TREATMENT (OUTPATIENT)
Dept: PHYSICAL THERAPY | Facility: CLINIC | Age: 77
End: 2022-05-12

## 2022-05-12 DIAGNOSIS — N81.6 RECTOCELE: Primary | ICD-10-CM

## 2022-05-12 DIAGNOSIS — N81.89 PELVIC FLOOR WEAKNESS IN FEMALE: ICD-10-CM

## 2022-05-12 PROCEDURE — 97161 PT EVAL LOW COMPLEX 20 MIN: CPT | Performed by: PHYSICAL THERAPIST

## 2022-05-12 PROCEDURE — 97110 THERAPEUTIC EXERCISES: CPT | Performed by: PHYSICAL THERAPIST

## 2022-05-12 NOTE — PROGRESS NOTES
Physical Therapy Initial Evaluation and Plan of Care      Patient: Lolis Griffith   : 1945  Diagnosis/ICD-10 Code:  Rectocele [N81.6]  Referring practitioner: ROBE Shannon  Date of Initial Visit: 2022  Today's Date: 2022  Patient seen for 1 sessions           Subjective Questionnaire: Pelvic organ prolapse distress inventory score 8=20-39% limitation      Subjective Evaluation    History of Present Illness  Mechanism of injury: Patient is a 76 yr old female referred to physical therapy with diagnosis of rectocele.  Patient states she had a surgery for prolapse 21 and it is starting to fall again.  Patient states wanted to try therapy to strengthen pelvic floor to prevent further prolapse.     Pain  No pain reported             Objective          Functional Assessment     Comments  Pelvic floor strength 2/5; able to maintain contraction 3-4 seconds; patient was unable to produce much /squeeze.  Minimal co-contraction of transverse abdominal noted  Bilateral hip strength 4-/5 with rotation and extension          Assessment & Plan     Assessment  Impairments: impaired physical strength and lacks appropriate home exercise program    Assessment details: Pt presents with limitations, noted by evaluation that impede patient's ability to perform pelvic floor contractions/strengthen in order to decrease progression of pelvic organ prolapse.  The skills of a therapist will be required to safely and effectively implement the following treatment plan to restore maximal level of function.    Prognosis: good    Goals  Plan Goals: 1. Other PT Primary Functional Limitation     LTG 1: 8 weeks:  The patient will demonstrate 0% limitation by achieving a score of 0 on the Pelvic organ prolapse distress inventory.    STATUS:  New   STG 1a: 4 weeks:  The patient will demonstrate 1-19% limitation by achieving a score of 4 on the pelvic organ prolapse distress inventory      STATUS:  New    2.  Pelvic  floor weakness   LTG2: 8 weeks: Patient will present with 3+/5 strength of the pelvic floor musculature with patient reporting 75% improvement with complaints of pelvic floor prolapse  STATUS:  New   STG2a: 4weeks:  Strength of the pelvic floor musculature at 3/5.   STATUS:  New   Treatment:  Therapeutic exercise to increase strength and endurance of the pelvic floor, biofeedback as needed to aid in the strengthening process, patient education on extensive HEP, patient education on urge control techniques and pelvic bracing.      3. The patient demonstrates weakness of the bilateral hip.   LTG 2: 8weeks:  The patient will demonstrate 4+/5 strength for bilateral hip rotation, abduction, and extension in order to improve hip stability.   STATUS:  New   STG 2a: 4 weeks:  The patient will demonstrate 4/5 strength for bilateral hip rotation, abduction, and extension.   STATUS:  New          Plan  Therapy options: will be seen for skilled therapy services  Planned modality interventions: electrical stimulation/Russian stimulation  Planned therapy interventions: manual therapy, abdominal trunk stabilization, strengthening, home exercise program, IADL retraining and ADL retraining  Frequency: 1x week  Duration in weeks: 8  Treatment plan discussed with: patient      History # of Personal Factors and/or Comorbidities: LOW (0)  Examination of Body System(s): # of elements: LOW (1-2)  Clinical Presentation: STABLE   Clinical Decision Making: LOW       Visit Diagnoses:    ICD-10-CM ICD-9-CM   1. Rectocele  N81.6 618.04       Timed:  Manual Therapy:         mins  21351;  Therapeutic Exercise:    14     mins  50293;     Neuromuscular [place]:        mins  53876;    Therapeutic Activity:          mins  45996;     Gait Training:           mins  32627;     Ultrasound:          mins  87290;    Electrical Stimulation:         mins  36996 ( );    Untimed:  Electrical Stimulation:         mins  94672 ( );  Mechanical  Traction:         mins  43430;    PT evaluation     Low Eval                        40     Mins  38119  Mod Eval                        0     Mins  54125  High Eval                       0     Mins  10791    Timed Treatment:   14   mins   Total Treatment:     54   mins    PT SIGNATURE: Neris Ortega, PT     Electronically singed 5/12/2022    [place] PT license: [id number]        Initial Certification  Certification Period: 5/12/2022 thru 8/9/2022  I certify that the therapy services are furnished while this patient is under my care.  The services outlined above are required by this patient, and will be reviewed every 90 days.    PHYSICIAN: Claribel Rosen APRN  NPI: [id number]                                      DATE:     Please sign and return via fax to [phone]  Thank you, [facility].

## 2022-05-18 ENCOUNTER — TREATMENT (OUTPATIENT)
Dept: PHYSICAL THERAPY | Facility: CLINIC | Age: 77
End: 2022-05-18

## 2022-05-18 DIAGNOSIS — N81.6 RECTOCELE: Primary | ICD-10-CM

## 2022-05-18 DIAGNOSIS — N81.89 PELVIC FLOOR WEAKNESS IN FEMALE: ICD-10-CM

## 2022-05-18 PROCEDURE — 97110 THERAPEUTIC EXERCISES: CPT | Performed by: PHYSICAL THERAPIST

## 2022-05-18 NOTE — PROGRESS NOTES
Physical Therapy Daily Treatment Note      Patient: Lolis Griffith   : 1945  Referring practitioner: ROBE Shannon  Date of Initial Visit: Type: THERAPY  Noted: 2022  Today's Date: 2022  Patient seen for 2 sessions           Subjective   Lolis Griffith reports: less heaviness/pressure.       Objective   See Exercise, Manual, and Modality Logs for complete treatment.       Assessment & Plan     Assessment  Prognosis details: Patient demonstrates improved ability to maintain pelvic floor contraction;progressed from 5 sec to 8 sec hold.         Visit Diagnoses:    ICD-10-CM ICD-9-CM   1. Rectocele  N81.6 618.04   2. Pelvic floor weakness in female  N81.89 618.89       Progress per Plan of Care           Timed:  Manual Therapy:         mins  63388;  Therapeutic Exercise:    39     mins  55286;     Neuromuscular [place]:        mins  62388;    Therapeutic Activity:          mins  75681;     Gait Training:           mins  94859;     Ultrasound:          mins  18712;    Electrical Stimulation:         mins  31516 ( );    Untimed:  Electrical Stimulation:         mins  46121 ( );  Mechanical Traction:         mins  36014;     Timed Treatment:   39   mins   Total Treatment:     39   mins  Neris Ortega PT    Electronically singed 2022      [place] PT license: [id number]  Physical Therapist

## 2022-05-31 ENCOUNTER — TREATMENT (OUTPATIENT)
Dept: PHYSICAL THERAPY | Facility: CLINIC | Age: 77
End: 2022-05-31

## 2022-05-31 DIAGNOSIS — N81.89 PELVIC FLOOR WEAKNESS IN FEMALE: ICD-10-CM

## 2022-05-31 DIAGNOSIS — N81.6 RECTOCELE: Primary | ICD-10-CM

## 2022-05-31 PROCEDURE — 97110 THERAPEUTIC EXERCISES: CPT | Performed by: PHYSICAL THERAPIST

## 2022-05-31 NOTE — PROGRESS NOTES
Physical Therapy Daily Treatment Note      Patient: Lolis Griffith   : 1945  Referring practitioner: ROBE Shannon  Date of Initial Visit: Type: THERAPY  Noted: 2022  Today's Date: 2022  Patient seen for 3 sessions           Subjective   Lolis Griffith reports: compliant with home exercise program.      Objective   See Exercise, Manual, and Modality Logs for complete treatment.       Assessment & Plan     Assessment  Prognosis details: Patient improved strength of pelvic floor with goal of biofeedback up to 10 from 9 last session.         Visit Diagnoses:    ICD-10-CM ICD-9-CM   1. Rectocele  N81.6 618.04   2. Pelvic floor weakness in female  N81.89 618.89       Progress per Plan of Care           Timed:  Manual Therapy:         mins  26311;  Therapeutic Exercise:    25     mins  42695;     Neuromuscular [place]:        mins  36517;    Therapeutic Activity:          mins  71086;     Gait Training:           mins  56604;     Ultrasound:          mins  42661;    Electrical Stimulation:         mins  76819 ( );    Untimed:  Electrical Stimulation:         mins  29438 ( );  Mechanical Traction:         mins  79516;     Timed Treatment:   25   mins   Total Treatment:     25   mins  Neris Ortega PT    Electronically singed 2022      [place] PT license: [id number]  Physical Therapist

## 2022-06-08 ENCOUNTER — TREATMENT (OUTPATIENT)
Dept: PHYSICAL THERAPY | Facility: CLINIC | Age: 77
End: 2022-06-08

## 2022-06-08 DIAGNOSIS — N81.6 RECTOCELE: Primary | ICD-10-CM

## 2022-06-08 DIAGNOSIS — N81.89 PELVIC FLOOR WEAKNESS IN FEMALE: ICD-10-CM

## 2022-06-08 PROCEDURE — 97110 THERAPEUTIC EXERCISES: CPT | Performed by: PHYSICAL THERAPIST

## 2022-06-08 NOTE — PROGRESS NOTES
Physical Therapy Daily Treatment Note      Patient: Lolis Griffith   : 1945  Referring practitioner: ROBE Shannon  Date of Initial Visit: Type: THERAPY  Noted: 2022  Today's Date: 2022  Patient seen for 4 sessions           Subjective   Lolis Griffith reports: compliant with home exercises and states feeling less prolapse.       Objective   See Exercise, Manual, and Modality Logs for complete treatment.       Assessment & Plan     Assessment  Prognosis details: Patient tolerated progression of core/pelvic strengthening today and upgraded home exercise program. May discharge next session to home exercise program.         Visit Diagnoses:    ICD-10-CM ICD-9-CM   1. Rectocele  N81.6 618.04   2. Pelvic floor weakness in female  N81.89 618.89       Anticipate DC next Visit           Timed:  Manual Therapy:         mins  93063;  Therapeutic Exercise:    30     mins  88147;     Neuromuscular [place]:        mins  46277;    Therapeutic Activity:          mins  10817;     Gait Training:           mins  82441;     Ultrasound:          mins  49843;    Electrical Stimulation:         mins  65495 ( );    Untimed:  Electrical Stimulation:         mins  75825 ( );  Mechanical Traction:         mins  85133;     Timed Treatment:   30   mins   Total Treatment:     30   mins  Neris Ortega PT    Electronically singed 2022      [place] PT license: [id number]  Physical Therapist

## 2022-06-13 ENCOUNTER — TREATMENT (OUTPATIENT)
Dept: PHYSICAL THERAPY | Facility: CLINIC | Age: 77
End: 2022-06-13

## 2022-06-13 DIAGNOSIS — N81.6 RECTOCELE: Primary | ICD-10-CM

## 2022-06-13 DIAGNOSIS — N81.89 PELVIC FLOOR WEAKNESS IN FEMALE: ICD-10-CM

## 2022-06-13 PROCEDURE — 97110 THERAPEUTIC EXERCISES: CPT | Performed by: PHYSICAL THERAPIST

## 2022-06-13 NOTE — PROGRESS NOTES
Physical Therapy Progress Note      Patient: Lolis Griffith   : 1945  Referring practitioner: ROBE Shannon  Date of Initial Visit: Type: THERAPY  Noted: 2022  Today's Date: 2022  Patient seen for 5 sessions           Subjective   Lolis Griffith reports:compliant with home exercises and not feeling prolapse as much as she was prior to therapy.  Subjective Questionnaire: Pelvic organ prolapse distress inventory score 3=1-19% limitation improved from initial score 8=20-39% limitation      Objective          Functional Assessment     Comments  Pelvic floor strength 3/5; able to maintain contraction 7 seconds (improved 3 seconds)  Bilateral hip strength 4/5 with rotation and extension (improved 1/2 grade)      See Exercise, Manual, and Modality Logs for complete treatment.       Assessment & Plan       Goals  Plan Goals: 1. Other PT Primary Functional Limitation                                   LTG 1: 8 weeks:  The patient will demonstrate 0% limitation by achieving a score of 0 on the Pelvic organ prolapse distress inventory.                          STATUS:  Not met              STG 1a: 4 weeks:  The patient will demonstrate 1-19% limitation by achieving a score of 4 on the pelvic organ prolapse distress inventory                            STATUS:  Met    2.  Pelvic floor weakness   LTG2: 8 weeks: Patient will present with 3+/5 strength of the pelvic floor musculature with patient reporting 75% improvement with complaints of pelvic floor prolapse  STATUS: Not met  STG2a: 4weeks:  Strength of the pelvic floor musculature at 3/5.   STATUS:  Met   Treatment:  Therapeutic exercise to increase strength and endurance of the pelvic floor, biofeedback as needed to aid in the strengthening process, patient education on extensive HEP, patient education on urge control techniques and pelvic bracing.      3. The patient demonstrates weakness of the bilateral hip.   LTG 2: 8weeks:  The patient will  demonstrate 4+/5 strength for bilateral hip rotation, abduction, and extension in order to improve hip stability.   STATUS:  Not met   STG 2a: 4 weeks:  The patient will demonstrate 4/5 strength for bilateral hip rotation, abduction, and extension.   STATUS:  Met    Plan  Treatment plan discussed with: patient  Plan details: Patient discharged to home exercise program.        Visit Diagnoses:    ICD-10-CM ICD-9-CM   1. Rectocele  N81.6 618.04   2. Pelvic floor weakness in female  N81.89 618.89       Other: Discharge to home exercise program.           Timed:  Manual Therapy:         mins  34222;  Therapeutic Exercise:    38     mins  85401;     Neuromuscular [place]:        mins  36530;    Therapeutic Activity:          mins  01880;     Gait Training:           mins  80332;     Ultrasound:          mins  15578;    Electrical Stimulation:         mins  23049 ( );    Untimed:  Electrical Stimulation:         mins  94155 ( );  Mechanical Traction:         mins  61646;     Timed Treatment:   38   mins   Total Treatment:     38   mins  Neris Ortega PT    Electronically singed 6/13/2022      [place] PT license: [id number]  Physical Therapist

## 2022-07-12 ENCOUNTER — TRANSCRIBE ORDERS (OUTPATIENT)
Dept: LAB | Facility: HOSPITAL | Age: 77
End: 2022-07-12

## 2022-07-12 ENCOUNTER — LAB (OUTPATIENT)
Dept: LAB | Facility: HOSPITAL | Age: 77
End: 2022-07-12

## 2022-07-12 DIAGNOSIS — E66.9 OBESITY, UNSPECIFIED CLASSIFICATION, UNSPECIFIED OBESITY TYPE, UNSPECIFIED WHETHER SERIOUS COMORBIDITY PRESENT: ICD-10-CM

## 2022-07-12 DIAGNOSIS — I10 ESSENTIAL (PRIMARY) HYPERTENSION: Primary | ICD-10-CM

## 2022-07-12 DIAGNOSIS — R53.83 OTHER FATIGUE: ICD-10-CM

## 2022-07-12 DIAGNOSIS — E78.5 HYPERLIPIDEMIA, UNSPECIFIED HYPERLIPIDEMIA TYPE: ICD-10-CM

## 2022-07-12 DIAGNOSIS — N18.30 STAGE 3 CHRONIC KIDNEY DISEASE, UNSPECIFIED WHETHER STAGE 3A OR 3B CKD: ICD-10-CM

## 2022-07-12 DIAGNOSIS — E55.9 VITAMIN D DEFICIENCY: ICD-10-CM

## 2022-07-12 DIAGNOSIS — J45.909 ASTHMA, UNSPECIFIED ASTHMA SEVERITY, UNSPECIFIED WHETHER COMPLICATED, UNSPECIFIED WHETHER PERSISTENT: ICD-10-CM

## 2022-07-12 DIAGNOSIS — N18.30 STAGE 3 CHRONIC KIDNEY DISEASE, UNSPECIFIED WHETHER STAGE 3A OR 3B CKD: Primary | ICD-10-CM

## 2022-07-12 DIAGNOSIS — R73.01 IMPAIRED FASTING GLUCOSE: ICD-10-CM

## 2022-07-12 DIAGNOSIS — D50.9 IRON DEFICIENCY ANEMIA, UNSPECIFIED IRON DEFICIENCY ANEMIA TYPE: ICD-10-CM

## 2022-07-12 DIAGNOSIS — I10 ESSENTIAL (PRIMARY) HYPERTENSION: ICD-10-CM

## 2022-07-12 LAB
25(OH)D3 SERPL-MCNC: 56.2 NG/ML (ref 30–100)
ALBUMIN SERPL-MCNC: 4.4 G/DL (ref 3.5–5.2)
ALBUMIN/GLOB SERPL: 1.9 G/DL
ALP SERPL-CCNC: 36 U/L (ref 39–117)
ALT SERPL W P-5'-P-CCNC: 11 U/L (ref 1–33)
ANION GAP SERPL CALCULATED.3IONS-SCNC: 13.6 MMOL/L (ref 5–15)
AST SERPL-CCNC: 23 U/L (ref 1–32)
BASOPHILS # BLD AUTO: 0.04 10*3/MM3 (ref 0–0.2)
BASOPHILS NFR BLD AUTO: 0.8 % (ref 0–1.5)
BILIRUB SERPL-MCNC: 0.4 MG/DL (ref 0–1.2)
BILIRUB UR QL STRIP: NEGATIVE
BUN SERPL-MCNC: 23 MG/DL (ref 8–23)
BUN/CREAT SERPL: 23.7 (ref 7–25)
CALCIUM SPEC-SCNC: 9.8 MG/DL (ref 8.6–10.5)
CHLORIDE SERPL-SCNC: 99 MMOL/L (ref 98–107)
CHOLEST SERPL-MCNC: 152 MG/DL (ref 0–200)
CLARITY UR: CLEAR
CO2 SERPL-SCNC: 26.4 MMOL/L (ref 22–29)
COLOR UR: YELLOW
CREAT SERPL-MCNC: 0.97 MG/DL (ref 0.57–1)
CREAT UR-MCNC: 35.7 MG/DL
DEPRECATED RDW RBC AUTO: 41.1 FL (ref 37–54)
EGFRCR SERPLBLD CKD-EPI 2021: 61.4 ML/MIN/1.73
EOSINOPHIL # BLD AUTO: 0.39 10*3/MM3 (ref 0–0.4)
EOSINOPHIL NFR BLD AUTO: 7.6 % (ref 0.3–6.2)
ERYTHROCYTE [DISTWIDTH] IN BLOOD BY AUTOMATED COUNT: 12.8 % (ref 12.3–15.4)
FOLATE SERPL-MCNC: 9.55 NG/ML (ref 4.78–24.2)
GLOBULIN UR ELPH-MCNC: 2.3 GM/DL
GLUCOSE SERPL-MCNC: 81 MG/DL (ref 65–99)
GLUCOSE UR STRIP-MCNC: NEGATIVE MG/DL
HBA1C MFR BLD: 5.3 % (ref 4.8–5.6)
HCT VFR BLD AUTO: 44.3 % (ref 34–46.6)
HDLC SERPL-MCNC: 63 MG/DL (ref 40–60)
HGB BLD-MCNC: 15.6 G/DL (ref 12–15.9)
HGB UR QL STRIP.AUTO: NEGATIVE
IMM GRANULOCYTES # BLD AUTO: 0.02 10*3/MM3 (ref 0–0.05)
IMM GRANULOCYTES NFR BLD AUTO: 0.4 % (ref 0–0.5)
IRON 24H UR-MRATE: 112 MCG/DL (ref 37–145)
IRON SATN MFR SERPL: 28 % (ref 20–50)
KETONES UR QL STRIP: NEGATIVE
LDLC SERPL CALC-MCNC: 74 MG/DL (ref 0–100)
LDLC/HDLC SERPL: 1.16 {RATIO}
LEUKOCYTE ESTERASE UR QL STRIP.AUTO: NEGATIVE
LYMPHOCYTES # BLD AUTO: 1.76 10*3/MM3 (ref 0.7–3.1)
LYMPHOCYTES NFR BLD AUTO: 34.4 % (ref 19.6–45.3)
MCH RBC QN AUTO: 31.5 PG (ref 26.6–33)
MCHC RBC AUTO-ENTMCNC: 35.2 G/DL (ref 31.5–35.7)
MCV RBC AUTO: 89.3 FL (ref 79–97)
MONOCYTES # BLD AUTO: 0.42 10*3/MM3 (ref 0.1–0.9)
MONOCYTES NFR BLD AUTO: 8.2 % (ref 5–12)
NEUTROPHILS NFR BLD AUTO: 2.49 10*3/MM3 (ref 1.7–7)
NEUTROPHILS NFR BLD AUTO: 48.6 % (ref 42.7–76)
NITRITE UR QL STRIP: NEGATIVE
NRBC BLD AUTO-RTO: 0 /100 WBC (ref 0–0.2)
PH UR STRIP.AUTO: 7 [PH] (ref 5–8)
PHOSPHATE SERPL-MCNC: 3.2 MG/DL (ref 2.5–4.5)
PLATELET # BLD AUTO: 194 10*3/MM3 (ref 140–450)
PMV BLD AUTO: 10.4 FL (ref 6–12)
POTASSIUM SERPL-SCNC: 3.8 MMOL/L (ref 3.5–5.2)
PROT ?TM UR-MCNC: <4 MG/DL
PROT SERPL-MCNC: 6.7 G/DL (ref 6–8.5)
PROT UR QL STRIP: NEGATIVE
PROT/CREAT UR: NORMAL MG/G{CREAT}
PTH-INTACT SERPL-MCNC: 13.9 PG/ML (ref 15–65)
RBC # BLD AUTO: 4.96 10*6/MM3 (ref 3.77–5.28)
SODIUM SERPL-SCNC: 139 MMOL/L (ref 136–145)
SP GR UR STRIP: 1.01 (ref 1–1.03)
T4 FREE SERPL-MCNC: 1.15 NG/DL (ref 0.93–1.7)
TIBC SERPL-MCNC: 401 MCG/DL (ref 298–536)
TRANSFERRIN SERPL-MCNC: 269 MG/DL (ref 200–360)
TRIGL SERPL-MCNC: 81 MG/DL (ref 0–150)
TSH SERPL DL<=0.05 MIU/L-ACNC: 2.6 UIU/ML (ref 0.27–4.2)
URATE SERPL-MCNC: 4.2 MG/DL (ref 2.4–5.7)
UROBILINOGEN UR QL STRIP: NORMAL
VIT B12 BLD-MCNC: 397 PG/ML (ref 211–946)
VLDLC SERPL-MCNC: 15 MG/DL (ref 5–40)
WBC NRBC COR # BLD: 5.12 10*3/MM3 (ref 3.4–10.8)

## 2022-07-12 PROCEDURE — 84443 ASSAY THYROID STIM HORMONE: CPT

## 2022-07-12 PROCEDURE — 82607 VITAMIN B-12: CPT

## 2022-07-12 PROCEDURE — 85025 COMPLETE CBC W/AUTO DIFF WBC: CPT

## 2022-07-12 PROCEDURE — 84156 ASSAY OF PROTEIN URINE: CPT

## 2022-07-12 PROCEDURE — 84466 ASSAY OF TRANSFERRIN: CPT

## 2022-07-12 PROCEDURE — 84439 ASSAY OF FREE THYROXINE: CPT

## 2022-07-12 PROCEDURE — 83036 HEMOGLOBIN GLYCOSYLATED A1C: CPT

## 2022-07-12 PROCEDURE — 83970 ASSAY OF PARATHORMONE: CPT

## 2022-07-12 PROCEDURE — 82746 ASSAY OF FOLIC ACID SERUM: CPT

## 2022-07-12 PROCEDURE — 84100 ASSAY OF PHOSPHORUS: CPT

## 2022-07-12 PROCEDURE — 81003 URINALYSIS AUTO W/O SCOPE: CPT

## 2022-07-12 PROCEDURE — 36415 COLL VENOUS BLD VENIPUNCTURE: CPT

## 2022-07-12 PROCEDURE — 80061 LIPID PANEL: CPT

## 2022-07-12 PROCEDURE — 83540 ASSAY OF IRON: CPT

## 2022-07-12 PROCEDURE — 82306 VITAMIN D 25 HYDROXY: CPT

## 2022-07-12 PROCEDURE — 80053 COMPREHEN METABOLIC PANEL: CPT

## 2022-07-12 PROCEDURE — 82570 ASSAY OF URINE CREATININE: CPT

## 2022-07-12 PROCEDURE — 84550 ASSAY OF BLOOD/URIC ACID: CPT

## 2022-09-17 PROCEDURE — U0004 COV-19 TEST NON-CDC HGH THRU: HCPCS | Performed by: PHYSICIAN ASSISTANT

## 2022-10-12 ENCOUNTER — LAB (OUTPATIENT)
Dept: LAB | Facility: HOSPITAL | Age: 77
End: 2022-10-12

## 2022-10-12 ENCOUNTER — TRANSCRIBE ORDERS (OUTPATIENT)
Dept: LAB | Facility: HOSPITAL | Age: 77
End: 2022-10-12

## 2022-10-12 DIAGNOSIS — M81.0 SENILE OSTEOPOROSIS: ICD-10-CM

## 2022-10-12 DIAGNOSIS — K21.9 CHALASIA OF LOWER ESOPHAGEAL SPHINCTER: ICD-10-CM

## 2022-10-12 DIAGNOSIS — R53.83 TIREDNESS: ICD-10-CM

## 2022-10-12 DIAGNOSIS — K59.09 OTHER CONSTIPATION: ICD-10-CM

## 2022-10-12 DIAGNOSIS — J45.909 ALLERGIC ASTHMA WITH STATED CAUSE: ICD-10-CM

## 2022-10-12 DIAGNOSIS — R73.01 IMPAIRED FASTING GLUCOSE: ICD-10-CM

## 2022-10-12 DIAGNOSIS — D50.9 IRON DEFICIENCY ANEMIA, UNSPECIFIED IRON DEFICIENCY ANEMIA TYPE: ICD-10-CM

## 2022-10-12 DIAGNOSIS — E66.9 OBESITY, UNSPECIFIED CLASSIFICATION, UNSPECIFIED OBESITY TYPE, UNSPECIFIED WHETHER SERIOUS COMORBIDITY PRESENT: ICD-10-CM

## 2022-10-12 DIAGNOSIS — E55.9 AVITAMINOSIS D: ICD-10-CM

## 2022-10-12 DIAGNOSIS — E78.5 HYPERLIPIDEMIA, UNSPECIFIED HYPERLIPIDEMIA TYPE: Primary | ICD-10-CM

## 2022-10-12 DIAGNOSIS — N19 RENAL FAILURE, UNSPECIFIED CHRONICITY: ICD-10-CM

## 2022-10-12 DIAGNOSIS — E78.5 HYPERLIPIDEMIA, UNSPECIFIED HYPERLIPIDEMIA TYPE: ICD-10-CM

## 2022-10-12 LAB
25(OH)D3 SERPL-MCNC: 80.9 NG/ML (ref 30–100)
ALBUMIN SERPL-MCNC: 4.1 G/DL (ref 3.5–5.2)
ALBUMIN/GLOB SERPL: 2.1 G/DL
ALP SERPL-CCNC: 34 U/L (ref 39–117)
ALT SERPL W P-5'-P-CCNC: 14 U/L (ref 1–33)
ANION GAP SERPL CALCULATED.3IONS-SCNC: 7.6 MMOL/L (ref 5–15)
AST SERPL-CCNC: 22 U/L (ref 1–32)
BASOPHILS # BLD AUTO: 0.05 10*3/MM3 (ref 0–0.2)
BASOPHILS NFR BLD AUTO: 1 % (ref 0–1.5)
BILIRUB SERPL-MCNC: 0.5 MG/DL (ref 0–1.2)
BUN SERPL-MCNC: 20 MG/DL (ref 8–23)
BUN/CREAT SERPL: 18.3 (ref 7–25)
CALCIUM SPEC-SCNC: 9.7 MG/DL (ref 8.6–10.5)
CHLORIDE SERPL-SCNC: 98 MMOL/L (ref 98–107)
CHOLEST SERPL-MCNC: 150 MG/DL (ref 0–200)
CO2 SERPL-SCNC: 30.4 MMOL/L (ref 22–29)
CREAT SERPL-MCNC: 1.09 MG/DL (ref 0.57–1)
DEPRECATED RDW RBC AUTO: 41 FL (ref 37–54)
EGFRCR SERPLBLD CKD-EPI 2021: 53.4 ML/MIN/1.73
EOSINOPHIL # BLD AUTO: 0.18 10*3/MM3 (ref 0–0.4)
EOSINOPHIL NFR BLD AUTO: 3.7 % (ref 0.3–6.2)
ERYTHROCYTE [DISTWIDTH] IN BLOOD BY AUTOMATED COUNT: 12.6 % (ref 12.3–15.4)
FOLATE SERPL-MCNC: 12 NG/ML (ref 4.78–24.2)
GLOBULIN UR ELPH-MCNC: 2 GM/DL
GLUCOSE SERPL-MCNC: 88 MG/DL (ref 65–99)
HBA1C MFR BLD: 5.3 % (ref 4.8–5.6)
HCT VFR BLD AUTO: 44.6 % (ref 34–46.6)
HDLC SERPL-MCNC: 63 MG/DL (ref 40–60)
HGB BLD-MCNC: 15.2 G/DL (ref 12–15.9)
IMM GRANULOCYTES # BLD AUTO: 0.01 10*3/MM3 (ref 0–0.05)
IMM GRANULOCYTES NFR BLD AUTO: 0.2 % (ref 0–0.5)
IRON 24H UR-MRATE: 124 MCG/DL (ref 37–145)
IRON SATN MFR SERPL: 32 % (ref 20–50)
LDLC SERPL CALC-MCNC: 69 MG/DL (ref 0–100)
LDLC/HDLC SERPL: 1.06 {RATIO}
LYMPHOCYTES # BLD AUTO: 1.93 10*3/MM3 (ref 0.7–3.1)
LYMPHOCYTES NFR BLD AUTO: 39.5 % (ref 19.6–45.3)
MCH RBC QN AUTO: 30.8 PG (ref 26.6–33)
MCHC RBC AUTO-ENTMCNC: 34.1 G/DL (ref 31.5–35.7)
MCV RBC AUTO: 90.5 FL (ref 79–97)
MONOCYTES # BLD AUTO: 0.43 10*3/MM3 (ref 0.1–0.9)
MONOCYTES NFR BLD AUTO: 8.8 % (ref 5–12)
NEUTROPHILS NFR BLD AUTO: 2.28 10*3/MM3 (ref 1.7–7)
NEUTROPHILS NFR BLD AUTO: 46.8 % (ref 42.7–76)
NRBC BLD AUTO-RTO: 0 /100 WBC (ref 0–0.2)
PLATELET # BLD AUTO: 196 10*3/MM3 (ref 140–450)
PMV BLD AUTO: 10.5 FL (ref 6–12)
POTASSIUM SERPL-SCNC: 4.4 MMOL/L (ref 3.5–5.2)
PROT SERPL-MCNC: 6.1 G/DL (ref 6–8.5)
RBC # BLD AUTO: 4.93 10*6/MM3 (ref 3.77–5.28)
SODIUM SERPL-SCNC: 136 MMOL/L (ref 136–145)
T4 FREE SERPL-MCNC: 1.14 NG/DL (ref 0.93–1.7)
TIBC SERPL-MCNC: 390 MCG/DL (ref 298–536)
TRANSFERRIN SERPL-MCNC: 262 MG/DL (ref 200–360)
TRIGL SERPL-MCNC: 101 MG/DL (ref 0–150)
TSH SERPL DL<=0.05 MIU/L-ACNC: 2.1 UIU/ML (ref 0.27–4.2)
VIT B12 BLD-MCNC: 403 PG/ML (ref 211–946)
VLDLC SERPL-MCNC: 18 MG/DL (ref 5–40)
WBC NRBC COR # BLD: 4.88 10*3/MM3 (ref 3.4–10.8)

## 2022-10-12 PROCEDURE — 80053 COMPREHEN METABOLIC PANEL: CPT

## 2022-10-12 PROCEDURE — 36415 COLL VENOUS BLD VENIPUNCTURE: CPT

## 2022-10-12 PROCEDURE — 80061 LIPID PANEL: CPT

## 2022-10-12 PROCEDURE — 84439 ASSAY OF FREE THYROXINE: CPT

## 2022-10-12 PROCEDURE — 82306 VITAMIN D 25 HYDROXY: CPT

## 2022-10-12 PROCEDURE — 84466 ASSAY OF TRANSFERRIN: CPT

## 2022-10-12 PROCEDURE — 84443 ASSAY THYROID STIM HORMONE: CPT

## 2022-10-12 PROCEDURE — 82746 ASSAY OF FOLIC ACID SERUM: CPT

## 2022-10-12 PROCEDURE — 82607 VITAMIN B-12: CPT

## 2022-10-12 PROCEDURE — 85025 COMPLETE CBC W/AUTO DIFF WBC: CPT

## 2022-10-12 PROCEDURE — 83036 HEMOGLOBIN GLYCOSYLATED A1C: CPT

## 2022-10-12 PROCEDURE — 83540 ASSAY OF IRON: CPT

## 2023-01-10 ENCOUNTER — TRANSCRIBE ORDERS (OUTPATIENT)
Dept: LAB | Facility: HOSPITAL | Age: 78
End: 2023-01-10
Payer: OTHER GOVERNMENT

## 2023-01-10 ENCOUNTER — LAB (OUTPATIENT)
Dept: LAB | Facility: HOSPITAL | Age: 78
End: 2023-01-10
Payer: MEDICARE

## 2023-01-10 DIAGNOSIS — E55.9 AVITAMINOSIS D: ICD-10-CM

## 2023-01-10 DIAGNOSIS — J30.89 PERENNIAL ALLERGIC RHINITIS: ICD-10-CM

## 2023-01-10 DIAGNOSIS — M81.8 IDIOPATHIC OSTEOPOROSIS: ICD-10-CM

## 2023-01-10 DIAGNOSIS — E78.5 HYPERLIPIDEMIA, UNSPECIFIED HYPERLIPIDEMIA TYPE: Primary | ICD-10-CM

## 2023-01-10 DIAGNOSIS — R73.01 IMPAIRED FASTING GLUCOSE: ICD-10-CM

## 2023-01-10 DIAGNOSIS — H61.23 IMPACTED CERUMEN OF BOTH EARS: ICD-10-CM

## 2023-01-10 DIAGNOSIS — I10 ESSENTIAL HYPERTENSION, MALIGNANT: ICD-10-CM

## 2023-01-10 DIAGNOSIS — R53.83 TIREDNESS: ICD-10-CM

## 2023-01-10 DIAGNOSIS — J44.9 OBSTRUCTIVE CHRONIC BRONCHITIS WITHOUT EXACERBATION: ICD-10-CM

## 2023-01-10 DIAGNOSIS — R10.84 ABDOMINAL PAIN, GENERALIZED: ICD-10-CM

## 2023-01-10 DIAGNOSIS — D50.9 IRON DEFICIENCY ANEMIA, UNSPECIFIED IRON DEFICIENCY ANEMIA TYPE: ICD-10-CM

## 2023-01-10 DIAGNOSIS — E78.5 HYPERLIPIDEMIA, UNSPECIFIED HYPERLIPIDEMIA TYPE: ICD-10-CM

## 2023-01-10 LAB
25(OH)D3 SERPL-MCNC: 82.6 NG/ML (ref 30–100)
ALBUMIN SERPL-MCNC: 4.3 G/DL (ref 3.5–5.2)
ALBUMIN/GLOB SERPL: 1.9 G/DL
ALP SERPL-CCNC: 36 U/L (ref 39–117)
ALT SERPL W P-5'-P-CCNC: 16 U/L (ref 1–33)
ANION GAP SERPL CALCULATED.3IONS-SCNC: 9.4 MMOL/L (ref 5–15)
AST SERPL-CCNC: 28 U/L (ref 1–32)
BASOPHILS # BLD AUTO: 0.05 10*3/MM3 (ref 0–0.2)
BASOPHILS NFR BLD AUTO: 1.1 % (ref 0–1.5)
BILIRUB SERPL-MCNC: 0.5 MG/DL (ref 0–1.2)
BUN SERPL-MCNC: 18 MG/DL (ref 8–23)
BUN/CREAT SERPL: 17.6 (ref 7–25)
CALCIUM SPEC-SCNC: 9.5 MG/DL (ref 8.6–10.5)
CHLORIDE SERPL-SCNC: 99 MMOL/L (ref 98–107)
CHOLEST SERPL-MCNC: 156 MG/DL (ref 0–200)
CO2 SERPL-SCNC: 30.6 MMOL/L (ref 22–29)
CREAT SERPL-MCNC: 1.02 MG/DL (ref 0.57–1)
DEPRECATED RDW RBC AUTO: 41 FL (ref 37–54)
EGFRCR SERPLBLD CKD-EPI 2021: 57.5 ML/MIN/1.73
EOSINOPHIL # BLD AUTO: 0.26 10*3/MM3 (ref 0–0.4)
EOSINOPHIL NFR BLD AUTO: 5.5 % (ref 0.3–6.2)
ERYTHROCYTE [DISTWIDTH] IN BLOOD BY AUTOMATED COUNT: 12.3 % (ref 12.3–15.4)
FOLATE SERPL-MCNC: 14.7 NG/ML (ref 4.78–24.2)
GLOBULIN UR ELPH-MCNC: 2.3 GM/DL
GLUCOSE SERPL-MCNC: 90 MG/DL (ref 65–99)
HBA1C MFR BLD: 5.5 % (ref 4.8–5.6)
HCT VFR BLD AUTO: 41.3 % (ref 34–46.6)
HDLC SERPL-MCNC: 65 MG/DL (ref 40–60)
HGB BLD-MCNC: 14.3 G/DL (ref 12–15.9)
IMM GRANULOCYTES # BLD AUTO: 0.01 10*3/MM3 (ref 0–0.05)
IMM GRANULOCYTES NFR BLD AUTO: 0.2 % (ref 0–0.5)
IRON 24H UR-MRATE: 95 MCG/DL (ref 37–145)
IRON SATN MFR SERPL: 25 % (ref 20–50)
LDLC SERPL CALC-MCNC: 76 MG/DL (ref 0–100)
LDLC/HDLC SERPL: 1.16 {RATIO}
LYMPHOCYTES # BLD AUTO: 1.71 10*3/MM3 (ref 0.7–3.1)
LYMPHOCYTES NFR BLD AUTO: 36.1 % (ref 19.6–45.3)
MCH RBC QN AUTO: 31 PG (ref 26.6–33)
MCHC RBC AUTO-ENTMCNC: 34.6 G/DL (ref 31.5–35.7)
MCV RBC AUTO: 89.6 FL (ref 79–97)
MONOCYTES # BLD AUTO: 0.43 10*3/MM3 (ref 0.1–0.9)
MONOCYTES NFR BLD AUTO: 9.1 % (ref 5–12)
NEUTROPHILS NFR BLD AUTO: 2.28 10*3/MM3 (ref 1.7–7)
NEUTROPHILS NFR BLD AUTO: 48 % (ref 42.7–76)
NRBC BLD AUTO-RTO: 0 /100 WBC (ref 0–0.2)
PLATELET # BLD AUTO: 216 10*3/MM3 (ref 140–450)
PMV BLD AUTO: 10.3 FL (ref 6–12)
POTASSIUM SERPL-SCNC: 3.8 MMOL/L (ref 3.5–5.2)
PROT SERPL-MCNC: 6.6 G/DL (ref 6–8.5)
RBC # BLD AUTO: 4.61 10*6/MM3 (ref 3.77–5.28)
SODIUM SERPL-SCNC: 139 MMOL/L (ref 136–145)
T4 FREE SERPL-MCNC: 1.28 NG/DL (ref 0.93–1.7)
TIBC SERPL-MCNC: 378 MCG/DL (ref 298–536)
TRANSFERRIN SERPL-MCNC: 254 MG/DL (ref 200–360)
TRIGL SERPL-MCNC: 78 MG/DL (ref 0–150)
TSH SERPL DL<=0.05 MIU/L-ACNC: 2.6 UIU/ML (ref 0.27–4.2)
URATE SERPL-MCNC: 4.5 MG/DL (ref 2.4–5.7)
VIT B12 BLD-MCNC: 393 PG/ML (ref 211–946)
VLDLC SERPL-MCNC: 15 MG/DL (ref 5–40)
WBC NRBC COR # BLD: 4.74 10*3/MM3 (ref 3.4–10.8)

## 2023-01-10 PROCEDURE — 84439 ASSAY OF FREE THYROXINE: CPT

## 2023-01-10 PROCEDURE — 82746 ASSAY OF FOLIC ACID SERUM: CPT

## 2023-01-10 PROCEDURE — 82306 VITAMIN D 25 HYDROXY: CPT

## 2023-01-10 PROCEDURE — 82607 VITAMIN B-12: CPT

## 2023-01-10 PROCEDURE — 84443 ASSAY THYROID STIM HORMONE: CPT

## 2023-01-10 PROCEDURE — 36415 COLL VENOUS BLD VENIPUNCTURE: CPT

## 2023-01-10 PROCEDURE — 84466 ASSAY OF TRANSFERRIN: CPT

## 2023-01-10 PROCEDURE — 84550 ASSAY OF BLOOD/URIC ACID: CPT

## 2023-01-10 PROCEDURE — 83540 ASSAY OF IRON: CPT

## 2023-01-10 PROCEDURE — 80053 COMPREHEN METABOLIC PANEL: CPT

## 2023-01-10 PROCEDURE — 85025 COMPLETE CBC W/AUTO DIFF WBC: CPT

## 2023-01-10 PROCEDURE — 83036 HEMOGLOBIN GLYCOSYLATED A1C: CPT

## 2023-01-10 PROCEDURE — 80061 LIPID PANEL: CPT

## 2023-01-18 ENCOUNTER — TRANSCRIBE ORDERS (OUTPATIENT)
Dept: ADMINISTRATIVE | Facility: HOSPITAL | Age: 78
End: 2023-01-18
Payer: OTHER GOVERNMENT

## 2023-01-18 DIAGNOSIS — Z13.9 SCREENING DUE: Primary | ICD-10-CM

## 2023-04-17 ENCOUNTER — HOSPITAL ENCOUNTER (OUTPATIENT)
Dept: MAMMOGRAPHY | Facility: HOSPITAL | Age: 78
Discharge: HOME | End: 2023-04-17
Admitting: NURSE PRACTITIONER
Payer: MEDICARE

## 2023-04-17 DIAGNOSIS — Z13.9 SCREENING DUE: ICD-10-CM

## 2023-04-17 PROCEDURE — 77067 SCR MAMMO BI INCL CAD: CPT

## 2023-04-17 PROCEDURE — 77063 BREAST TOMOSYNTHESIS BI: CPT

## 2023-04-19 ENCOUNTER — TRANSCRIBE ORDERS (OUTPATIENT)
Dept: LAB | Facility: HOSPITAL | Age: 78
End: 2023-04-19
Payer: OTHER GOVERNMENT

## 2023-04-19 ENCOUNTER — LAB (OUTPATIENT)
Dept: LAB | Facility: HOSPITAL | Age: 78
End: 2023-04-19
Payer: MEDICARE

## 2023-04-19 DIAGNOSIS — Z12.31 SCREENING MAMMOGRAM FOR HIGH-RISK PATIENT: ICD-10-CM

## 2023-04-19 DIAGNOSIS — R53.83 TIREDNESS: ICD-10-CM

## 2023-04-19 DIAGNOSIS — R73.01 IMPAIRED FASTING GLUCOSE: ICD-10-CM

## 2023-04-19 DIAGNOSIS — J44.9 OBSTRUCTIVE CHRONIC BRONCHITIS WITHOUT EXACERBATION: ICD-10-CM

## 2023-04-19 DIAGNOSIS — I10 ESSENTIAL HYPERTENSION, MALIGNANT: ICD-10-CM

## 2023-04-19 DIAGNOSIS — E78.5 HYPERLIPIDEMIA, UNSPECIFIED HYPERLIPIDEMIA TYPE: ICD-10-CM

## 2023-04-19 DIAGNOSIS — D50.9 IRON DEFICIENCY ANEMIA, UNSPECIFIED IRON DEFICIENCY ANEMIA TYPE: ICD-10-CM

## 2023-04-19 DIAGNOSIS — J30.89 PERENNIAL ALLERGIC RHINITIS: ICD-10-CM

## 2023-04-19 DIAGNOSIS — E55.9 VITAMIN D DEFICIENCY: ICD-10-CM

## 2023-04-19 DIAGNOSIS — Z12.31 SCREENING MAMMOGRAM FOR HIGH-RISK PATIENT: Primary | ICD-10-CM

## 2023-04-19 LAB
25(OH)D3 SERPL-MCNC: 69.2 NG/ML (ref 30–100)
ALBUMIN SERPL-MCNC: 4.2 G/DL (ref 3.5–5.2)
ALBUMIN/GLOB SERPL: 1.6 G/DL
ALP SERPL-CCNC: 38 U/L (ref 39–117)
ALT SERPL W P-5'-P-CCNC: 15 U/L (ref 1–33)
ANION GAP SERPL CALCULATED.3IONS-SCNC: 8 MMOL/L (ref 5–15)
AST SERPL-CCNC: 28 U/L (ref 1–32)
BASOPHILS # BLD AUTO: 0.05 10*3/MM3 (ref 0–0.2)
BASOPHILS NFR BLD AUTO: 1 % (ref 0–1.5)
BILIRUB SERPL-MCNC: 0.5 MG/DL (ref 0–1.2)
BUN SERPL-MCNC: 21 MG/DL (ref 8–23)
BUN/CREAT SERPL: 20.6 (ref 7–25)
CALCIUM SPEC-SCNC: 9.6 MG/DL (ref 8.6–10.5)
CHLORIDE SERPL-SCNC: 101 MMOL/L (ref 98–107)
CHOLEST SERPL-MCNC: 145 MG/DL (ref 0–200)
CO2 SERPL-SCNC: 30 MMOL/L (ref 22–29)
CREAT SERPL-MCNC: 1.02 MG/DL (ref 0.57–1)
DEPRECATED RDW RBC AUTO: 43.3 FL (ref 37–54)
EGFRCR SERPLBLD CKD-EPI 2021: 57.5 ML/MIN/1.73
EOSINOPHIL # BLD AUTO: 0.2 10*3/MM3 (ref 0–0.4)
EOSINOPHIL NFR BLD AUTO: 4 % (ref 0.3–6.2)
ERYTHROCYTE [DISTWIDTH] IN BLOOD BY AUTOMATED COUNT: 12.7 % (ref 12.3–15.4)
FERRITIN SERPL-MCNC: 133 NG/ML (ref 13–150)
FOLATE SERPL-MCNC: 13.9 NG/ML (ref 4.78–24.2)
GLOBULIN UR ELPH-MCNC: 2.6 GM/DL
GLUCOSE SERPL-MCNC: 87 MG/DL (ref 65–99)
HBA1C MFR BLD: 5.2 % (ref 4.8–5.6)
HCT VFR BLD AUTO: 44.5 % (ref 34–46.6)
HDLC SERPL-MCNC: 59 MG/DL (ref 40–60)
HGB BLD-MCNC: 15 G/DL (ref 12–15.9)
IMM GRANULOCYTES # BLD AUTO: 0.01 10*3/MM3 (ref 0–0.05)
IMM GRANULOCYTES NFR BLD AUTO: 0.2 % (ref 0–0.5)
IRON 24H UR-MRATE: 84 MCG/DL (ref 37–145)
IRON SATN MFR SERPL: 22 % (ref 20–50)
LDLC SERPL CALC-MCNC: 70 MG/DL (ref 0–100)
LDLC/HDLC SERPL: 1.18 {RATIO}
LYMPHOCYTES # BLD AUTO: 1.67 10*3/MM3 (ref 0.7–3.1)
LYMPHOCYTES NFR BLD AUTO: 33.1 % (ref 19.6–45.3)
MCH RBC QN AUTO: 31.3 PG (ref 26.6–33)
MCHC RBC AUTO-ENTMCNC: 33.7 G/DL (ref 31.5–35.7)
MCV RBC AUTO: 92.9 FL (ref 79–97)
MONOCYTES # BLD AUTO: 0.46 10*3/MM3 (ref 0.1–0.9)
MONOCYTES NFR BLD AUTO: 9.1 % (ref 5–12)
NEUTROPHILS NFR BLD AUTO: 2.65 10*3/MM3 (ref 1.7–7)
NEUTROPHILS NFR BLD AUTO: 52.6 % (ref 42.7–76)
NRBC BLD AUTO-RTO: 0 /100 WBC (ref 0–0.2)
PLATELET # BLD AUTO: 225 10*3/MM3 (ref 140–450)
PMV BLD AUTO: 10.2 FL (ref 6–12)
POTASSIUM SERPL-SCNC: 4.1 MMOL/L (ref 3.5–5.2)
PROT SERPL-MCNC: 6.8 G/DL (ref 6–8.5)
RBC # BLD AUTO: 4.79 10*6/MM3 (ref 3.77–5.28)
SODIUM SERPL-SCNC: 139 MMOL/L (ref 136–145)
T4 FREE SERPL-MCNC: 1.34 NG/DL (ref 0.93–1.7)
TIBC SERPL-MCNC: 375 MCG/DL (ref 298–536)
TRANSFERRIN SERPL-MCNC: 252 MG/DL (ref 200–360)
TRIGL SERPL-MCNC: 83 MG/DL (ref 0–150)
TSH SERPL DL<=0.05 MIU/L-ACNC: 2.23 UIU/ML (ref 0.27–4.2)
URATE SERPL-MCNC: 4 MG/DL (ref 2.4–5.7)
VIT B12 BLD-MCNC: 408 PG/ML (ref 211–946)
VLDLC SERPL-MCNC: 16 MG/DL (ref 5–40)
WBC NRBC COR # BLD: 5.04 10*3/MM3 (ref 3.4–10.8)

## 2023-04-19 PROCEDURE — 84439 ASSAY OF FREE THYROXINE: CPT

## 2023-04-19 PROCEDURE — 85025 COMPLETE CBC W/AUTO DIFF WBC: CPT

## 2023-04-19 PROCEDURE — 84466 ASSAY OF TRANSFERRIN: CPT

## 2023-04-19 PROCEDURE — 84443 ASSAY THYROID STIM HORMONE: CPT

## 2023-04-19 PROCEDURE — 82746 ASSAY OF FOLIC ACID SERUM: CPT

## 2023-04-19 PROCEDURE — 82728 ASSAY OF FERRITIN: CPT

## 2023-04-19 PROCEDURE — 84550 ASSAY OF BLOOD/URIC ACID: CPT

## 2023-04-19 PROCEDURE — 82607 VITAMIN B-12: CPT

## 2023-04-19 PROCEDURE — 80053 COMPREHEN METABOLIC PANEL: CPT

## 2023-04-19 PROCEDURE — 83540 ASSAY OF IRON: CPT

## 2023-04-19 PROCEDURE — 36415 COLL VENOUS BLD VENIPUNCTURE: CPT

## 2023-04-19 PROCEDURE — 80061 LIPID PANEL: CPT

## 2023-04-19 PROCEDURE — 82306 VITAMIN D 25 HYDROXY: CPT

## 2023-04-19 PROCEDURE — 83036 HEMOGLOBIN GLYCOSYLATED A1C: CPT

## 2023-08-01 ENCOUNTER — LAB (OUTPATIENT)
Dept: LAB | Facility: HOSPITAL | Age: 78
End: 2023-08-01
Payer: MEDICARE

## 2023-08-01 ENCOUNTER — TRANSCRIBE ORDERS (OUTPATIENT)
Dept: LAB | Facility: HOSPITAL | Age: 78
End: 2023-08-01
Payer: OTHER GOVERNMENT

## 2023-08-01 DIAGNOSIS — E78.5 HYPERLIPIDEMIA, UNSPECIFIED HYPERLIPIDEMIA TYPE: ICD-10-CM

## 2023-08-01 DIAGNOSIS — J44.9 OBSTRUCTIVE CHRONIC BRONCHITIS WITHOUT EXACERBATION: ICD-10-CM

## 2023-08-01 DIAGNOSIS — R53.83 OTHER FATIGUE: ICD-10-CM

## 2023-08-01 DIAGNOSIS — E66.9 OBESITY, UNSPECIFIED CLASSIFICATION, UNSPECIFIED OBESITY TYPE, UNSPECIFIED WHETHER SERIOUS COMORBIDITY PRESENT: ICD-10-CM

## 2023-08-01 DIAGNOSIS — J01.90 ACUTE SINUSITIS, RECURRENCE NOT SPECIFIED, UNSPECIFIED LOCATION: ICD-10-CM

## 2023-08-01 DIAGNOSIS — E55.9 VITAMIN D DEFICIENCY: ICD-10-CM

## 2023-08-01 DIAGNOSIS — M81.8 OTHER OSTEOPOROSIS WITHOUT CURRENT PATHOLOGICAL FRACTURE: ICD-10-CM

## 2023-08-01 DIAGNOSIS — R73.01 IMPAIRED FASTING GLUCOSE: ICD-10-CM

## 2023-08-01 DIAGNOSIS — J30.89 OTHER ALLERGIC RHINITIS: ICD-10-CM

## 2023-08-01 DIAGNOSIS — I10 ESSENTIAL (PRIMARY) HYPERTENSION: Primary | ICD-10-CM

## 2023-08-01 DIAGNOSIS — I10 ESSENTIAL (PRIMARY) HYPERTENSION: ICD-10-CM

## 2023-08-01 LAB
25(OH)D3 SERPL-MCNC: 61.3 NG/ML (ref 30–100)
ALBUMIN SERPL-MCNC: 4 G/DL (ref 3.5–5.2)
ALBUMIN/GLOB SERPL: 1.6 G/DL
ALP SERPL-CCNC: 35 U/L (ref 39–117)
ALT SERPL W P-5'-P-CCNC: 15 U/L (ref 1–33)
ANION GAP SERPL CALCULATED.3IONS-SCNC: 11.4 MMOL/L (ref 5–15)
AST SERPL-CCNC: 22 U/L (ref 1–32)
BASOPHILS # BLD AUTO: 0.04 10*3/MM3 (ref 0–0.2)
BASOPHILS NFR BLD AUTO: 0.7 % (ref 0–1.5)
BILIRUB SERPL-MCNC: 0.4 MG/DL (ref 0–1.2)
BUN SERPL-MCNC: 18 MG/DL (ref 8–23)
BUN/CREAT SERPL: 19.8 (ref 7–25)
CALCIUM SPEC-SCNC: 9.8 MG/DL (ref 8.6–10.5)
CHLORIDE SERPL-SCNC: 100 MMOL/L (ref 98–107)
CHOLEST SERPL-MCNC: 148 MG/DL (ref 0–200)
CO2 SERPL-SCNC: 26.6 MMOL/L (ref 22–29)
CREAT SERPL-MCNC: 0.91 MG/DL (ref 0.57–1)
DEPRECATED RDW RBC AUTO: 43.8 FL (ref 37–54)
EGFRCR SERPLBLD CKD-EPI 2021: 65.9 ML/MIN/1.73
EOSINOPHIL # BLD AUTO: 0.2 10*3/MM3 (ref 0–0.4)
EOSINOPHIL NFR BLD AUTO: 3.3 % (ref 0.3–6.2)
ERYTHROCYTE [DISTWIDTH] IN BLOOD BY AUTOMATED COUNT: 12.9 % (ref 12.3–15.4)
FOLATE SERPL-MCNC: 14 NG/ML (ref 4.78–24.2)
GLOBULIN UR ELPH-MCNC: 2.5 GM/DL
GLUCOSE SERPL-MCNC: 87 MG/DL (ref 65–99)
HBA1C MFR BLD: 5.4 % (ref 4.8–5.6)
HCT VFR BLD AUTO: 44 % (ref 34–46.6)
HDLC SERPL-MCNC: 61 MG/DL (ref 40–60)
HGB BLD-MCNC: 14.6 G/DL (ref 12–15.9)
IMM GRANULOCYTES # BLD AUTO: 0.01 10*3/MM3 (ref 0–0.05)
IMM GRANULOCYTES NFR BLD AUTO: 0.2 % (ref 0–0.5)
IRON 24H UR-MRATE: 63 MCG/DL (ref 37–145)
IRON SATN MFR SERPL: 18 % (ref 20–50)
LDLC SERPL CALC-MCNC: 73 MG/DL (ref 0–100)
LDLC/HDLC SERPL: 1.2 {RATIO}
LYMPHOCYTES # BLD AUTO: 1.39 10*3/MM3 (ref 0.7–3.1)
LYMPHOCYTES NFR BLD AUTO: 23.2 % (ref 19.6–45.3)
MCH RBC QN AUTO: 30.5 PG (ref 26.6–33)
MCHC RBC AUTO-ENTMCNC: 33.2 G/DL (ref 31.5–35.7)
MCV RBC AUTO: 91.9 FL (ref 79–97)
MONOCYTES # BLD AUTO: 0.49 10*3/MM3 (ref 0.1–0.9)
MONOCYTES NFR BLD AUTO: 8.2 % (ref 5–12)
NEUTROPHILS NFR BLD AUTO: 3.86 10*3/MM3 (ref 1.7–7)
NEUTROPHILS NFR BLD AUTO: 64.4 % (ref 42.7–76)
NRBC BLD AUTO-RTO: 0 /100 WBC (ref 0–0.2)
PLATELET # BLD AUTO: 236 10*3/MM3 (ref 140–450)
PMV BLD AUTO: 10.1 FL (ref 6–12)
POTASSIUM SERPL-SCNC: 4 MMOL/L (ref 3.5–5.2)
PROT SERPL-MCNC: 6.5 G/DL (ref 6–8.5)
RBC # BLD AUTO: 4.79 10*6/MM3 (ref 3.77–5.28)
SODIUM SERPL-SCNC: 138 MMOL/L (ref 136–145)
T4 FREE SERPL-MCNC: 1.14 NG/DL (ref 0.93–1.7)
TIBC SERPL-MCNC: 359 MCG/DL (ref 298–536)
TRANSFERRIN SERPL-MCNC: 241 MG/DL (ref 200–360)
TRIGL SERPL-MCNC: 70 MG/DL (ref 0–150)
TSH SERPL DL<=0.05 MIU/L-ACNC: 1.61 UIU/ML (ref 0.27–4.2)
URATE SERPL-MCNC: 4 MG/DL (ref 2.4–5.7)
VIT B12 BLD-MCNC: 517 PG/ML (ref 211–946)
VLDLC SERPL-MCNC: 14 MG/DL (ref 5–40)
WBC NRBC COR # BLD: 5.99 10*3/MM3 (ref 3.4–10.8)

## 2023-08-01 PROCEDURE — 82746 ASSAY OF FOLIC ACID SERUM: CPT

## 2023-08-01 PROCEDURE — 84466 ASSAY OF TRANSFERRIN: CPT

## 2023-08-01 PROCEDURE — 82607 VITAMIN B-12: CPT

## 2023-08-01 PROCEDURE — 84439 ASSAY OF FREE THYROXINE: CPT

## 2023-08-01 PROCEDURE — 83540 ASSAY OF IRON: CPT

## 2023-08-01 PROCEDURE — 82306 VITAMIN D 25 HYDROXY: CPT

## 2023-08-01 PROCEDURE — 85025 COMPLETE CBC W/AUTO DIFF WBC: CPT

## 2023-08-01 PROCEDURE — 84550 ASSAY OF BLOOD/URIC ACID: CPT

## 2023-08-01 PROCEDURE — 80061 LIPID PANEL: CPT

## 2023-08-01 PROCEDURE — 80053 COMPREHEN METABOLIC PANEL: CPT

## 2023-08-01 PROCEDURE — 36415 COLL VENOUS BLD VENIPUNCTURE: CPT

## 2023-08-01 PROCEDURE — 83036 HEMOGLOBIN GLYCOSYLATED A1C: CPT

## 2023-08-01 PROCEDURE — 84443 ASSAY THYROID STIM HORMONE: CPT

## 2023-08-23 ENCOUNTER — TRANSCRIBE ORDERS (OUTPATIENT)
Dept: LAB | Facility: HOSPITAL | Age: 78
End: 2023-08-23
Payer: OTHER GOVERNMENT

## 2023-08-23 ENCOUNTER — LAB (OUTPATIENT)
Dept: LAB | Facility: HOSPITAL | Age: 78
End: 2023-08-23
Payer: MEDICARE

## 2023-08-23 DIAGNOSIS — I10 HYPERTENSION, UNSPECIFIED TYPE: ICD-10-CM

## 2023-08-23 DIAGNOSIS — N18.31 CHRONIC KIDNEY DISEASE (CKD) STAGE G3A/A1, MODERATELY DECREASED GLOMERULAR FILTRATION RATE (GFR) BETWEEN 45-59 ML/MIN/1.73 SQUARE METER AND ALBUMINURIA CREATININE RATIO LESS THAN 30 MG/G (CMS/H*: Primary | ICD-10-CM

## 2023-08-23 DIAGNOSIS — N18.31 CHRONIC KIDNEY DISEASE (CKD) STAGE G3A/A1, MODERATELY DECREASED GLOMERULAR FILTRATION RATE (GFR) BETWEEN 45-59 ML/MIN/1.73 SQUARE METER AND ALBUMINURIA CREATININE RATIO LESS THAN 30 MG/G (CMS/H*: ICD-10-CM

## 2023-08-23 DIAGNOSIS — E78.5 HYPERLIPIDEMIA, UNSPECIFIED HYPERLIPIDEMIA TYPE: ICD-10-CM

## 2023-08-23 LAB — PTH-INTACT SERPL-MCNC: 22.1 PG/ML (ref 15–65)

## 2023-08-23 PROCEDURE — 83970 ASSAY OF PARATHORMONE: CPT

## 2023-08-23 PROCEDURE — 36415 COLL VENOUS BLD VENIPUNCTURE: CPT

## 2023-11-01 ENCOUNTER — LAB (OUTPATIENT)
Dept: LAB | Facility: HOSPITAL | Age: 78
End: 2023-11-01
Payer: MEDICARE

## 2023-11-01 ENCOUNTER — TRANSCRIBE ORDERS (OUTPATIENT)
Dept: LAB | Facility: HOSPITAL | Age: 78
End: 2023-11-01
Payer: OTHER GOVERNMENT

## 2023-11-01 DIAGNOSIS — I10 ESSENTIAL (PRIMARY) HYPERTENSION: Primary | ICD-10-CM

## 2023-11-01 DIAGNOSIS — E78.5 HYPERLIPIDEMIA, UNSPECIFIED HYPERLIPIDEMIA TYPE: ICD-10-CM

## 2023-11-01 DIAGNOSIS — M81.8 OTHER OSTEOPOROSIS WITHOUT CURRENT PATHOLOGICAL FRACTURE: ICD-10-CM

## 2023-11-01 DIAGNOSIS — R53.83 OTHER FATIGUE: ICD-10-CM

## 2023-11-01 DIAGNOSIS — N19 RENAL FAILURE, UNSPECIFIED CHRONICITY: ICD-10-CM

## 2023-11-01 DIAGNOSIS — I10 ESSENTIAL (PRIMARY) HYPERTENSION: ICD-10-CM

## 2023-11-01 DIAGNOSIS — J45.909 UNCOMPLICATED ASTHMA, UNSPECIFIED ASTHMA SEVERITY, UNSPECIFIED WHETHER PERSISTENT: ICD-10-CM

## 2023-11-01 DIAGNOSIS — R73.01 IMPAIRED FASTING GLUCOSE: ICD-10-CM

## 2023-11-01 DIAGNOSIS — E55.9 VITAMIN D DEFICIENCY: ICD-10-CM

## 2023-11-01 DIAGNOSIS — E66.9 OBESITY, UNSPECIFIED CLASSIFICATION, UNSPECIFIED OBESITY TYPE, UNSPECIFIED WHETHER SERIOUS COMORBIDITY PRESENT: ICD-10-CM

## 2023-11-01 DIAGNOSIS — J30.89 OTHER ALLERGIC RHINITIS: ICD-10-CM

## 2023-11-01 LAB
25(OH)D3 SERPL-MCNC: 54.9 NG/ML (ref 30–100)
ALBUMIN SERPL-MCNC: 4.2 G/DL (ref 3.5–5.2)
ALBUMIN/GLOB SERPL: 1.6 G/DL
ALP SERPL-CCNC: 33 U/L (ref 39–117)
ALT SERPL W P-5'-P-CCNC: 16 U/L (ref 1–33)
ANION GAP SERPL CALCULATED.3IONS-SCNC: 9.7 MMOL/L (ref 5–15)
AST SERPL-CCNC: 28 U/L (ref 1–32)
BASOPHILS # BLD AUTO: 0.05 10*3/MM3 (ref 0–0.2)
BASOPHILS NFR BLD AUTO: 1 % (ref 0–1.5)
BILIRUB SERPL-MCNC: 0.5 MG/DL (ref 0–1.2)
BUN SERPL-MCNC: 15 MG/DL (ref 8–23)
BUN/CREAT SERPL: 17.2 (ref 7–25)
CALCIUM SPEC-SCNC: 9.8 MG/DL (ref 8.6–10.5)
CHLORIDE SERPL-SCNC: 97 MMOL/L (ref 98–107)
CHOLEST SERPL-MCNC: 168 MG/DL (ref 0–200)
CO2 SERPL-SCNC: 29.3 MMOL/L (ref 22–29)
CREAT SERPL-MCNC: 0.87 MG/DL (ref 0.57–1)
DEPRECATED RDW RBC AUTO: 43.6 FL (ref 37–54)
EGFRCR SERPLBLD CKD-EPI 2021: 69.6 ML/MIN/1.73
EOSINOPHIL # BLD AUTO: 0.19 10*3/MM3 (ref 0–0.4)
EOSINOPHIL NFR BLD AUTO: 3.8 % (ref 0.3–6.2)
ERYTHROCYTE [DISTWIDTH] IN BLOOD BY AUTOMATED COUNT: 12.8 % (ref 12.3–15.4)
FOLATE SERPL-MCNC: 13.9 NG/ML (ref 4.78–24.2)
GLOBULIN UR ELPH-MCNC: 2.6 GM/DL
GLUCOSE SERPL-MCNC: 86 MG/DL (ref 65–99)
HBA1C MFR BLD: 5.1 % (ref 4.8–5.6)
HCT VFR BLD AUTO: 43.5 % (ref 34–46.6)
HDLC SERPL-MCNC: 66 MG/DL (ref 40–60)
HGB BLD-MCNC: 14.6 G/DL (ref 12–15.9)
IMM GRANULOCYTES # BLD AUTO: 0.01 10*3/MM3 (ref 0–0.05)
IMM GRANULOCYTES NFR BLD AUTO: 0.2 % (ref 0–0.5)
IRON 24H UR-MRATE: 78 MCG/DL (ref 37–145)
IRON SATN MFR SERPL: 20 % (ref 20–50)
LDLC SERPL CALC-MCNC: 87 MG/DL (ref 0–100)
LDLC/HDLC SERPL: 1.29 {RATIO}
LYMPHOCYTES # BLD AUTO: 1.55 10*3/MM3 (ref 0.7–3.1)
LYMPHOCYTES NFR BLD AUTO: 31.1 % (ref 19.6–45.3)
MCH RBC QN AUTO: 30.9 PG (ref 26.6–33)
MCHC RBC AUTO-ENTMCNC: 33.6 G/DL (ref 31.5–35.7)
MCV RBC AUTO: 92.2 FL (ref 79–97)
MONOCYTES # BLD AUTO: 0.5 10*3/MM3 (ref 0.1–0.9)
MONOCYTES NFR BLD AUTO: 10 % (ref 5–12)
NEUTROPHILS NFR BLD AUTO: 2.69 10*3/MM3 (ref 1.7–7)
NEUTROPHILS NFR BLD AUTO: 53.9 % (ref 42.7–76)
NRBC BLD AUTO-RTO: 0 /100 WBC (ref 0–0.2)
PLATELET # BLD AUTO: 230 10*3/MM3 (ref 140–450)
PMV BLD AUTO: 10.3 FL (ref 6–12)
POTASSIUM SERPL-SCNC: 4 MMOL/L (ref 3.5–5.2)
PROT SERPL-MCNC: 6.8 G/DL (ref 6–8.5)
RBC # BLD AUTO: 4.72 10*6/MM3 (ref 3.77–5.28)
SODIUM SERPL-SCNC: 136 MMOL/L (ref 136–145)
T4 FREE SERPL-MCNC: 1.14 NG/DL (ref 0.93–1.7)
TIBC SERPL-MCNC: 381 MCG/DL (ref 298–536)
TRANSFERRIN SERPL-MCNC: 256 MG/DL (ref 200–360)
TRIGL SERPL-MCNC: 83 MG/DL (ref 0–150)
TSH SERPL DL<=0.05 MIU/L-ACNC: 2.24 UIU/ML (ref 0.27–4.2)
URATE SERPL-MCNC: 3.8 MG/DL (ref 2.4–5.7)
VIT B12 BLD-MCNC: 434 PG/ML (ref 211–946)
VLDLC SERPL-MCNC: 15 MG/DL (ref 5–40)
WBC NRBC COR # BLD: 4.99 10*3/MM3 (ref 3.4–10.8)

## 2023-11-01 PROCEDURE — 85025 COMPLETE CBC W/AUTO DIFF WBC: CPT

## 2023-11-01 PROCEDURE — 84443 ASSAY THYROID STIM HORMONE: CPT

## 2023-11-01 PROCEDURE — 83036 HEMOGLOBIN GLYCOSYLATED A1C: CPT

## 2023-11-01 PROCEDURE — 84466 ASSAY OF TRANSFERRIN: CPT

## 2023-11-01 PROCEDURE — 80061 LIPID PANEL: CPT

## 2023-11-01 PROCEDURE — 84439 ASSAY OF FREE THYROXINE: CPT

## 2023-11-01 PROCEDURE — 36415 COLL VENOUS BLD VENIPUNCTURE: CPT

## 2023-11-01 PROCEDURE — 80053 COMPREHEN METABOLIC PANEL: CPT

## 2023-11-01 PROCEDURE — 84550 ASSAY OF BLOOD/URIC ACID: CPT

## 2023-11-01 PROCEDURE — 82306 VITAMIN D 25 HYDROXY: CPT

## 2023-11-01 PROCEDURE — 83540 ASSAY OF IRON: CPT

## 2023-11-01 PROCEDURE — 82607 VITAMIN B-12: CPT

## 2023-11-01 PROCEDURE — 82746 ASSAY OF FOLIC ACID SERUM: CPT

## 2024-01-30 ENCOUNTER — LAB (OUTPATIENT)
Dept: LAB | Facility: HOSPITAL | Age: 79
End: 2024-01-30
Payer: MEDICARE

## 2024-01-30 ENCOUNTER — TRANSCRIBE ORDERS (OUTPATIENT)
Dept: LAB | Facility: HOSPITAL | Age: 79
End: 2024-01-30
Payer: OTHER GOVERNMENT

## 2024-01-30 DIAGNOSIS — E11.22 TYPE 2 DIABETES MELLITUS WITH ESRD (END-STAGE RENAL DISEASE): ICD-10-CM

## 2024-01-30 DIAGNOSIS — E55.9 VITAMIN D DEFICIENCY: ICD-10-CM

## 2024-01-30 DIAGNOSIS — R73.01 IMPAIRED FASTING GLUCOSE: ICD-10-CM

## 2024-01-30 DIAGNOSIS — N18.6 TYPE 2 DIABETES MELLITUS WITH ESRD (END-STAGE RENAL DISEASE): ICD-10-CM

## 2024-01-30 DIAGNOSIS — E78.5 HYPERLIPIDEMIA, UNSPECIFIED HYPERLIPIDEMIA TYPE: ICD-10-CM

## 2024-01-30 DIAGNOSIS — E66.9 OBESITY, UNSPECIFIED CLASSIFICATION, UNSPECIFIED OBESITY TYPE, UNSPECIFIED WHETHER SERIOUS COMORBIDITY PRESENT: ICD-10-CM

## 2024-01-30 DIAGNOSIS — R10.84 ABDOMINAL PAIN, GENERALIZED: Primary | ICD-10-CM

## 2024-01-30 DIAGNOSIS — R53.83 OTHER FATIGUE: ICD-10-CM

## 2024-01-30 DIAGNOSIS — M81.8 OTHER OSTEOPOROSIS WITHOUT CURRENT PATHOLOGICAL FRACTURE: ICD-10-CM

## 2024-01-30 DIAGNOSIS — R10.84 ABDOMINAL PAIN, GENERALIZED: ICD-10-CM

## 2024-01-30 DIAGNOSIS — D50.9 IRON DEFICIENCY ANEMIA, UNSPECIFIED IRON DEFICIENCY ANEMIA TYPE: ICD-10-CM

## 2024-01-30 LAB
25(OH)D3 SERPL-MCNC: 59.8 NG/ML (ref 30–100)
ALBUMIN SERPL-MCNC: 4.2 G/DL (ref 3.5–5.2)
ALBUMIN UR-MCNC: <1.2 MG/DL
ALBUMIN/GLOB SERPL: 1.5 G/DL
ALP SERPL-CCNC: 35 U/L (ref 39–117)
ALT SERPL W P-5'-P-CCNC: 14 U/L (ref 1–33)
ANION GAP SERPL CALCULATED.3IONS-SCNC: 11.7 MMOL/L (ref 5–15)
AST SERPL-CCNC: 28 U/L (ref 1–32)
BASOPHILS # BLD AUTO: 0.05 10*3/MM3 (ref 0–0.2)
BASOPHILS NFR BLD AUTO: 0.9 % (ref 0–1.5)
BILIRUB SERPL-MCNC: 0.5 MG/DL (ref 0–1.2)
BUN SERPL-MCNC: 17 MG/DL (ref 8–23)
BUN/CREAT SERPL: 18.5 (ref 7–25)
CALCIUM SPEC-SCNC: 9.6 MG/DL (ref 8.6–10.5)
CHLORIDE SERPL-SCNC: 100 MMOL/L (ref 98–107)
CHOLEST SERPL-MCNC: 157 MG/DL (ref 0–200)
CO2 SERPL-SCNC: 26.3 MMOL/L (ref 22–29)
CREAT SERPL-MCNC: 0.92 MG/DL (ref 0.57–1)
DEPRECATED RDW RBC AUTO: 42 FL (ref 37–54)
EGFRCR SERPLBLD CKD-EPI 2021: 64.7 ML/MIN/1.73
EOSINOPHIL # BLD AUTO: 0.41 10*3/MM3 (ref 0–0.4)
EOSINOPHIL NFR BLD AUTO: 7.8 % (ref 0.3–6.2)
ERYTHROCYTE [DISTWIDTH] IN BLOOD BY AUTOMATED COUNT: 12.5 % (ref 12.3–15.4)
GLOBULIN UR ELPH-MCNC: 2.8 GM/DL
GLUCOSE SERPL-MCNC: 86 MG/DL (ref 65–99)
HBA1C MFR BLD: 5.3 % (ref 4.8–5.6)
HCT VFR BLD AUTO: 42.9 % (ref 34–46.6)
HDLC SERPL-MCNC: 66 MG/DL (ref 40–60)
HGB BLD-MCNC: 14.3 G/DL (ref 12–15.9)
IMM GRANULOCYTES # BLD AUTO: 0.01 10*3/MM3 (ref 0–0.05)
IMM GRANULOCYTES NFR BLD AUTO: 0.2 % (ref 0–0.5)
IRON 24H UR-MRATE: 86 MCG/DL (ref 37–145)
IRON SATN MFR SERPL: 22 % (ref 20–50)
LDLC SERPL CALC-MCNC: 79 MG/DL (ref 0–100)
LDLC/HDLC SERPL: 1.2 {RATIO}
LYMPHOCYTES # BLD AUTO: 1.66 10*3/MM3 (ref 0.7–3.1)
LYMPHOCYTES NFR BLD AUTO: 31.5 % (ref 19.6–45.3)
MCH RBC QN AUTO: 30.6 PG (ref 26.6–33)
MCHC RBC AUTO-ENTMCNC: 33.3 G/DL (ref 31.5–35.7)
MCV RBC AUTO: 91.9 FL (ref 79–97)
MONOCYTES # BLD AUTO: 0.46 10*3/MM3 (ref 0.1–0.9)
MONOCYTES NFR BLD AUTO: 8.7 % (ref 5–12)
NEUTROPHILS NFR BLD AUTO: 2.68 10*3/MM3 (ref 1.7–7)
NEUTROPHILS NFR BLD AUTO: 50.9 % (ref 42.7–76)
NRBC BLD AUTO-RTO: 0 /100 WBC (ref 0–0.2)
PLATELET # BLD AUTO: 240 10*3/MM3 (ref 140–450)
PMV BLD AUTO: 10.3 FL (ref 6–12)
POTASSIUM SERPL-SCNC: 4.1 MMOL/L (ref 3.5–5.2)
PROT SERPL-MCNC: 7 G/DL (ref 6–8.5)
RBC # BLD AUTO: 4.67 10*6/MM3 (ref 3.77–5.28)
SODIUM SERPL-SCNC: 138 MMOL/L (ref 136–145)
T4 FREE SERPL-MCNC: 1.26 NG/DL (ref 0.93–1.7)
TIBC SERPL-MCNC: 383 MCG/DL (ref 298–536)
TRANSFERRIN SERPL-MCNC: 257 MG/DL (ref 200–360)
TRIGL SERPL-MCNC: 58 MG/DL (ref 0–150)
TSH SERPL DL<=0.05 MIU/L-ACNC: 2.62 UIU/ML (ref 0.27–4.2)
URATE SERPL-MCNC: 3.5 MG/DL (ref 2.4–5.7)
VLDLC SERPL-MCNC: 12 MG/DL (ref 5–40)
WBC NRBC COR # BLD AUTO: 5.27 10*3/MM3 (ref 3.4–10.8)

## 2024-01-30 PROCEDURE — 83036 HEMOGLOBIN GLYCOSYLATED A1C: CPT

## 2024-01-30 PROCEDURE — 84550 ASSAY OF BLOOD/URIC ACID: CPT

## 2024-01-30 PROCEDURE — 84466 ASSAY OF TRANSFERRIN: CPT

## 2024-01-30 PROCEDURE — 36415 COLL VENOUS BLD VENIPUNCTURE: CPT

## 2024-01-30 PROCEDURE — 80053 COMPREHEN METABOLIC PANEL: CPT

## 2024-01-30 PROCEDURE — 82043 UR ALBUMIN QUANTITATIVE: CPT

## 2024-01-30 PROCEDURE — 85025 COMPLETE CBC W/AUTO DIFF WBC: CPT

## 2024-01-30 PROCEDURE — 83540 ASSAY OF IRON: CPT

## 2024-01-30 PROCEDURE — 80061 LIPID PANEL: CPT

## 2024-01-30 PROCEDURE — 82306 VITAMIN D 25 HYDROXY: CPT

## 2024-01-30 PROCEDURE — 84439 ASSAY OF FREE THYROXINE: CPT

## 2024-01-30 PROCEDURE — 84443 ASSAY THYROID STIM HORMONE: CPT

## 2024-02-06 ENCOUNTER — TRANSCRIBE ORDERS (OUTPATIENT)
Dept: ADMINISTRATIVE | Facility: HOSPITAL | Age: 79
End: 2024-02-06
Payer: OTHER GOVERNMENT

## 2024-02-06 DIAGNOSIS — Z12.31 VISIT FOR SCREENING MAMMOGRAM: Primary | ICD-10-CM

## 2024-04-22 ENCOUNTER — HOSPITAL ENCOUNTER (OUTPATIENT)
Dept: MAMMOGRAPHY | Facility: HOSPITAL | Age: 79
Discharge: HOME | End: 2024-04-22
Admitting: PEDIATRICS
Payer: MEDICARE

## 2024-04-22 DIAGNOSIS — Z12.31 VISIT FOR SCREENING MAMMOGRAM: ICD-10-CM

## 2024-04-22 PROCEDURE — 77067 SCR MAMMO BI INCL CAD: CPT

## 2024-04-22 PROCEDURE — 77063 BREAST TOMOSYNTHESIS BI: CPT

## 2024-04-30 ENCOUNTER — TRANSCRIBE ORDERS (OUTPATIENT)
Dept: LAB | Facility: HOSPITAL | Age: 79
End: 2024-04-30
Payer: OTHER GOVERNMENT

## 2024-04-30 ENCOUNTER — LAB (OUTPATIENT)
Dept: LAB | Facility: HOSPITAL | Age: 79
End: 2024-04-30
Payer: MEDICARE

## 2024-04-30 DIAGNOSIS — E55.9 VITAMIN D DEFICIENCY: ICD-10-CM

## 2024-04-30 DIAGNOSIS — E11.22 TYPE 2 DIABETES MELLITUS WITH DIABETIC CHRONIC KIDNEY DISEASE, UNSPECIFIED CKD STAGE, UNSPECIFIED WHETHER LONG TERM INSULIN USE: ICD-10-CM

## 2024-04-30 DIAGNOSIS — E78.5 HYPERLIPIDEMIA, UNSPECIFIED HYPERLIPIDEMIA TYPE: ICD-10-CM

## 2024-04-30 DIAGNOSIS — I10 HYPERTENSION, ESSENTIAL: Primary | ICD-10-CM

## 2024-04-30 DIAGNOSIS — J30.89 OTHER ALLERGIC RHINITIS: ICD-10-CM

## 2024-04-30 DIAGNOSIS — R53.83 OTHER FATIGUE: ICD-10-CM

## 2024-04-30 DIAGNOSIS — D50.9 IRON DEFICIENCY ANEMIA, UNSPECIFIED IRON DEFICIENCY ANEMIA TYPE: ICD-10-CM

## 2024-04-30 DIAGNOSIS — I10 HYPERTENSION, ESSENTIAL: ICD-10-CM

## 2024-04-30 DIAGNOSIS — R73.01 IMPAIRED FASTING GLUCOSE: ICD-10-CM

## 2024-04-30 LAB
25(OH)D3 SERPL-MCNC: 57.4 NG/ML (ref 30–100)
ALBUMIN SERPL-MCNC: 4.1 G/DL (ref 3.5–5.2)
ALBUMIN UR-MCNC: <1.2 MG/DL
ALBUMIN/GLOB SERPL: 1.6 G/DL
ALP SERPL-CCNC: 40 U/L (ref 39–117)
ALT SERPL W P-5'-P-CCNC: 15 U/L (ref 1–33)
ANION GAP SERPL CALCULATED.3IONS-SCNC: 6.7 MMOL/L (ref 5–15)
AST SERPL-CCNC: 27 U/L (ref 1–32)
BASOPHILS # BLD AUTO: 0.05 10*3/MM3 (ref 0–0.2)
BASOPHILS NFR BLD AUTO: 0.8 % (ref 0–1.5)
BILIRUB SERPL-MCNC: 0.4 MG/DL (ref 0–1.2)
BUN SERPL-MCNC: 24 MG/DL (ref 8–23)
BUN/CREAT SERPL: 27 (ref 7–25)
CALCIUM SPEC-SCNC: 9.9 MG/DL (ref 8.6–10.5)
CHLORIDE SERPL-SCNC: 102 MMOL/L (ref 98–107)
CHOLEST SERPL-MCNC: 139 MG/DL (ref 0–200)
CO2 SERPL-SCNC: 29.3 MMOL/L (ref 22–29)
CREAT SERPL-MCNC: 0.89 MG/DL (ref 0.57–1)
CREAT UR-MCNC: 36.8 MG/DL
DEPRECATED RDW RBC AUTO: 44.3 FL (ref 37–54)
EGFRCR SERPLBLD CKD-EPI 2021: 67.3 ML/MIN/1.73
EOSINOPHIL # BLD AUTO: 0.39 10*3/MM3 (ref 0–0.4)
EOSINOPHIL NFR BLD AUTO: 6.4 % (ref 0.3–6.2)
ERYTHROCYTE [DISTWIDTH] IN BLOOD BY AUTOMATED COUNT: 12.9 % (ref 12.3–15.4)
FOLATE SERPL-MCNC: 10.7 NG/ML (ref 4.78–24.2)
GLOBULIN UR ELPH-MCNC: 2.5 GM/DL
GLUCOSE SERPL-MCNC: 88 MG/DL (ref 65–99)
HBA1C MFR BLD: 5.5 % (ref 4.8–5.6)
HCT VFR BLD AUTO: 44.7 % (ref 34–46.6)
HDLC SERPL-MCNC: 59 MG/DL (ref 40–60)
HGB BLD-MCNC: 14.5 G/DL (ref 12–15.9)
IMM GRANULOCYTES # BLD AUTO: 0.02 10*3/MM3 (ref 0–0.05)
IMM GRANULOCYTES NFR BLD AUTO: 0.3 % (ref 0–0.5)
IRON 24H UR-MRATE: 78 MCG/DL (ref 37–145)
IRON SATN MFR SERPL: 22 % (ref 20–50)
LDLC SERPL CALC-MCNC: 67 MG/DL (ref 0–100)
LDLC/HDLC SERPL: 1.14 {RATIO}
LYMPHOCYTES # BLD AUTO: 1.7 10*3/MM3 (ref 0.7–3.1)
LYMPHOCYTES NFR BLD AUTO: 27.7 % (ref 19.6–45.3)
MCH RBC QN AUTO: 30.5 PG (ref 26.6–33)
MCHC RBC AUTO-ENTMCNC: 32.4 G/DL (ref 31.5–35.7)
MCV RBC AUTO: 94.1 FL (ref 79–97)
MICROALBUMIN/CREAT UR: NORMAL MG/G{CREAT}
MONOCYTES # BLD AUTO: 0.63 10*3/MM3 (ref 0.1–0.9)
MONOCYTES NFR BLD AUTO: 10.3 % (ref 5–12)
NEUTROPHILS NFR BLD AUTO: 3.35 10*3/MM3 (ref 1.7–7)
NEUTROPHILS NFR BLD AUTO: 54.5 % (ref 42.7–76)
NRBC BLD AUTO-RTO: 0 /100 WBC (ref 0–0.2)
PLATELET # BLD AUTO: 267 10*3/MM3 (ref 140–450)
PMV BLD AUTO: 10.1 FL (ref 6–12)
POTASSIUM SERPL-SCNC: 4.1 MMOL/L (ref 3.5–5.2)
PROT SERPL-MCNC: 6.6 G/DL (ref 6–8.5)
RBC # BLD AUTO: 4.75 10*6/MM3 (ref 3.77–5.28)
SODIUM SERPL-SCNC: 138 MMOL/L (ref 136–145)
T-UPTAKE NFR SERPL: 1.06 TBI (ref 0.8–1.3)
T4 SERPL-MCNC: 6.82 MCG/DL (ref 4.5–11.7)
TIBC SERPL-MCNC: 361 MCG/DL (ref 298–536)
TRANSFERRIN SERPL-MCNC: 242 MG/DL (ref 200–360)
TRIGL SERPL-MCNC: 64 MG/DL (ref 0–150)
TSH SERPL DL<=0.05 MIU/L-ACNC: 1.7 UIU/ML (ref 0.27–4.2)
URATE SERPL-MCNC: 3.8 MG/DL (ref 2.4–5.7)
VIT B12 BLD-MCNC: 494 PG/ML (ref 211–946)
VLDLC SERPL-MCNC: 13 MG/DL (ref 5–40)
WBC NRBC COR # BLD AUTO: 6.14 10*3/MM3 (ref 3.4–10.8)

## 2024-04-30 PROCEDURE — 84479 ASSAY OF THYROID (T3 OR T4): CPT

## 2024-04-30 PROCEDURE — 36415 COLL VENOUS BLD VENIPUNCTURE: CPT

## 2024-04-30 PROCEDURE — 83540 ASSAY OF IRON: CPT

## 2024-04-30 PROCEDURE — 83036 HEMOGLOBIN GLYCOSYLATED A1C: CPT

## 2024-04-30 PROCEDURE — 84550 ASSAY OF BLOOD/URIC ACID: CPT

## 2024-04-30 PROCEDURE — 82043 UR ALBUMIN QUANTITATIVE: CPT

## 2024-04-30 PROCEDURE — 82607 VITAMIN B-12: CPT

## 2024-04-30 PROCEDURE — 84436 ASSAY OF TOTAL THYROXINE: CPT

## 2024-04-30 PROCEDURE — 80053 COMPREHEN METABOLIC PANEL: CPT

## 2024-04-30 PROCEDURE — 80061 LIPID PANEL: CPT

## 2024-04-30 PROCEDURE — 82570 ASSAY OF URINE CREATININE: CPT

## 2024-04-30 PROCEDURE — 84443 ASSAY THYROID STIM HORMONE: CPT

## 2024-04-30 PROCEDURE — 82306 VITAMIN D 25 HYDROXY: CPT

## 2024-04-30 PROCEDURE — 85025 COMPLETE CBC W/AUTO DIFF WBC: CPT

## 2024-04-30 PROCEDURE — 84466 ASSAY OF TRANSFERRIN: CPT

## 2024-04-30 PROCEDURE — 82746 ASSAY OF FOLIC ACID SERUM: CPT

## 2024-08-06 ENCOUNTER — TRANSCRIBE ORDERS (OUTPATIENT)
Dept: LAB | Facility: HOSPITAL | Age: 79
End: 2024-08-06
Payer: OTHER GOVERNMENT

## 2024-08-06 ENCOUNTER — LAB (OUTPATIENT)
Dept: LAB | Facility: HOSPITAL | Age: 79
End: 2024-08-06
Payer: MEDICARE

## 2024-08-06 DIAGNOSIS — N18.31 CHRONIC KIDNEY DISEASE (CKD) STAGE G3A/A1, MODERATELY DECREASED GLOMERULAR FILTRATION RATE (GFR) BETWEEN 45-59 ML/MIN/1.73 SQUARE METER AND ALBUMINURIA CREATININE RATIO LESS THAN 30 MG/G (CMS/H*: Primary | ICD-10-CM

## 2024-08-06 DIAGNOSIS — N18.31 CHRONIC KIDNEY DISEASE (CKD) STAGE G3A/A1, MODERATELY DECREASED GLOMERULAR FILTRATION RATE (GFR) BETWEEN 45-59 ML/MIN/1.73 SQUARE METER AND ALBUMINURIA CREATININE RATIO LESS THAN 30 MG/G (CMS/H*: ICD-10-CM

## 2024-08-06 DIAGNOSIS — I10 HYPERTENSION, UNSPECIFIED TYPE: ICD-10-CM

## 2024-08-06 DIAGNOSIS — E78.5 HYPERLIPIDEMIA, UNSPECIFIED HYPERLIPIDEMIA TYPE: ICD-10-CM

## 2024-08-06 LAB
25(OH)D3 SERPL-MCNC: 62.2 NG/ML (ref 30–100)
ALBUMIN SERPL-MCNC: 4.1 G/DL (ref 3.5–5.2)
ANION GAP SERPL CALCULATED.3IONS-SCNC: 9 MMOL/L (ref 5–15)
BACTERIA UR QL AUTO: ABNORMAL /HPF
BASOPHILS # BLD AUTO: 0.04 10*3/MM3 (ref 0–0.2)
BASOPHILS NFR BLD AUTO: 0.6 % (ref 0–1.5)
BILIRUB UR QL STRIP: NEGATIVE
BUN SERPL-MCNC: 22 MG/DL (ref 8–23)
BUN/CREAT SERPL: 22.2 (ref 7–25)
CALCIUM SPEC-SCNC: 9.8 MG/DL (ref 8.6–10.5)
CHLORIDE SERPL-SCNC: 100 MMOL/L (ref 98–107)
CLARITY UR: ABNORMAL
CO2 SERPL-SCNC: 27 MMOL/L (ref 22–29)
COLOR UR: YELLOW
CREAT SERPL-MCNC: 0.99 MG/DL (ref 0.57–1)
CREAT UR-MCNC: 35.3 MG/DL
DEPRECATED RDW RBC AUTO: 42.7 FL (ref 37–54)
EGFRCR SERPLBLD CKD-EPI 2021: 59.2 ML/MIN/1.73
EOSINOPHIL # BLD AUTO: 0.37 10*3/MM3 (ref 0–0.4)
EOSINOPHIL NFR BLD AUTO: 5.1 % (ref 0.3–6.2)
ERYTHROCYTE [DISTWIDTH] IN BLOOD BY AUTOMATED COUNT: 12.8 % (ref 12.3–15.4)
GLUCOSE SERPL-MCNC: 88 MG/DL (ref 65–99)
GLUCOSE UR STRIP-MCNC: NEGATIVE MG/DL
HCT VFR BLD AUTO: 43.1 % (ref 34–46.6)
HGB BLD-MCNC: 14.1 G/DL (ref 12–15.9)
HGB UR QL STRIP.AUTO: NEGATIVE
HYALINE CASTS UR QL AUTO: ABNORMAL /LPF
IMM GRANULOCYTES # BLD AUTO: 0.03 10*3/MM3 (ref 0–0.05)
IMM GRANULOCYTES NFR BLD AUTO: 0.4 % (ref 0–0.5)
KETONES UR QL STRIP: NEGATIVE
LEUKOCYTE ESTERASE UR QL STRIP.AUTO: NEGATIVE
LYMPHOCYTES # BLD AUTO: 1.61 10*3/MM3 (ref 0.7–3.1)
LYMPHOCYTES NFR BLD AUTO: 22.3 % (ref 19.6–45.3)
MCH RBC QN AUTO: 29.9 PG (ref 26.6–33)
MCHC RBC AUTO-ENTMCNC: 32.7 G/DL (ref 31.5–35.7)
MCV RBC AUTO: 91.5 FL (ref 79–97)
MONOCYTES # BLD AUTO: 0.57 10*3/MM3 (ref 0.1–0.9)
MONOCYTES NFR BLD AUTO: 7.9 % (ref 5–12)
NEUTROPHILS NFR BLD AUTO: 4.61 10*3/MM3 (ref 1.7–7)
NEUTROPHILS NFR BLD AUTO: 63.7 % (ref 42.7–76)
NITRITE UR QL STRIP: NEGATIVE
NRBC BLD AUTO-RTO: 0 /100 WBC (ref 0–0.2)
PH UR STRIP.AUTO: 7.5 [PH] (ref 5–8)
PHOSPHATE SERPL-MCNC: 3.1 MG/DL (ref 2.5–4.5)
PLATELET # BLD AUTO: 258 10*3/MM3 (ref 140–450)
PMV BLD AUTO: 10.6 FL (ref 6–12)
POTASSIUM SERPL-SCNC: 4.1 MMOL/L (ref 3.5–5.2)
PROT ?TM UR-MCNC: 4.9 MG/DL
PROT UR QL STRIP: NEGATIVE
PROT/CREAT UR: 0.14 MG/G{CREAT}
PTH-INTACT SERPL-MCNC: 16.3 PG/ML (ref 15–65)
RBC # BLD AUTO: 4.71 10*6/MM3 (ref 3.77–5.28)
RBC # UR STRIP: ABNORMAL /HPF
REF LAB TEST METHOD: ABNORMAL
SODIUM SERPL-SCNC: 136 MMOL/L (ref 136–145)
SP GR UR STRIP: 1.01 (ref 1–1.03)
SQUAMOUS #/AREA URNS HPF: ABNORMAL /HPF
UROBILINOGEN UR QL STRIP: ABNORMAL
WBC # UR STRIP: ABNORMAL /HPF
WBC NRBC COR # BLD AUTO: 7.23 10*3/MM3 (ref 3.4–10.8)

## 2024-08-06 PROCEDURE — 81001 URINALYSIS AUTO W/SCOPE: CPT

## 2024-08-06 PROCEDURE — 82306 VITAMIN D 25 HYDROXY: CPT

## 2024-08-06 PROCEDURE — 36415 COLL VENOUS BLD VENIPUNCTURE: CPT

## 2024-08-06 PROCEDURE — 82570 ASSAY OF URINE CREATININE: CPT

## 2024-08-06 PROCEDURE — 84156 ASSAY OF PROTEIN URINE: CPT

## 2024-08-06 PROCEDURE — 83970 ASSAY OF PARATHORMONE: CPT

## 2024-08-06 PROCEDURE — 80069 RENAL FUNCTION PANEL: CPT

## 2024-08-06 PROCEDURE — 85025 COMPLETE CBC W/AUTO DIFF WBC: CPT

## 2024-09-03 ENCOUNTER — LAB (OUTPATIENT)
Dept: LAB | Facility: HOSPITAL | Age: 79
End: 2024-09-03
Payer: MEDICARE

## 2024-09-03 ENCOUNTER — TRANSCRIBE ORDERS (OUTPATIENT)
Dept: LAB | Facility: HOSPITAL | Age: 79
End: 2024-09-03
Payer: OTHER GOVERNMENT

## 2024-09-03 DIAGNOSIS — E78.5 HYPERLIPIDEMIA, UNSPECIFIED HYPERLIPIDEMIA TYPE: ICD-10-CM

## 2024-09-03 DIAGNOSIS — R53.83 OTHER FATIGUE: ICD-10-CM

## 2024-09-03 DIAGNOSIS — J45.909 UNCOMPLICATED ASTHMA, UNSPECIFIED ASTHMA SEVERITY, UNSPECIFIED WHETHER PERSISTENT: ICD-10-CM

## 2024-09-03 DIAGNOSIS — R73.01 IFG (IMPAIRED FASTING GLUCOSE): ICD-10-CM

## 2024-09-03 DIAGNOSIS — J30.89 OTHER ALLERGIC RHINITIS: ICD-10-CM

## 2024-09-03 DIAGNOSIS — I10 ESSENTIAL (PRIMARY) HYPERTENSION: ICD-10-CM

## 2024-09-03 DIAGNOSIS — I10 ESSENTIAL (PRIMARY) HYPERTENSION: Primary | ICD-10-CM

## 2024-09-03 DIAGNOSIS — E55.9 VITAMIN D DEFICIENCY: ICD-10-CM

## 2024-09-03 DIAGNOSIS — M81.8 OTHER OSTEOPOROSIS WITHOUT CURRENT PATHOLOGICAL FRACTURE: ICD-10-CM

## 2024-09-03 DIAGNOSIS — D50.9 IRON DEFICIENCY ANEMIA, UNSPECIFIED IRON DEFICIENCY ANEMIA TYPE: ICD-10-CM

## 2024-09-03 LAB
25(OH)D3 SERPL-MCNC: 61.4 NG/ML (ref 30–100)
ALBUMIN SERPL-MCNC: 4.1 G/DL (ref 3.5–5.2)
ALBUMIN/GLOB SERPL: 1.4 G/DL
ALP SERPL-CCNC: 43 U/L (ref 39–117)
ALT SERPL W P-5'-P-CCNC: 16 U/L (ref 1–33)
ANION GAP SERPL CALCULATED.3IONS-SCNC: 7.7 MMOL/L (ref 5–15)
AST SERPL-CCNC: 26 U/L (ref 1–32)
BASOPHILS # BLD AUTO: 0.04 10*3/MM3 (ref 0–0.2)
BASOPHILS NFR BLD AUTO: 0.6 % (ref 0–1.5)
BILIRUB SERPL-MCNC: 0.4 MG/DL (ref 0–1.2)
BUN SERPL-MCNC: 19 MG/DL (ref 8–23)
BUN/CREAT SERPL: 20.2 (ref 7–25)
CALCIUM SPEC-SCNC: 10.1 MG/DL (ref 8.6–10.5)
CHLORIDE SERPL-SCNC: 102 MMOL/L (ref 98–107)
CHOLEST SERPL-MCNC: 137 MG/DL (ref 0–200)
CO2 SERPL-SCNC: 27.3 MMOL/L (ref 22–29)
CREAT SERPL-MCNC: 0.94 MG/DL (ref 0.57–1)
DEPRECATED RDW RBC AUTO: 42.2 FL (ref 37–54)
EGFRCR SERPLBLD CKD-EPI 2021: 63 ML/MIN/1.73
EOSINOPHIL # BLD AUTO: 0.3 10*3/MM3 (ref 0–0.4)
EOSINOPHIL NFR BLD AUTO: 4.4 % (ref 0.3–6.2)
ERYTHROCYTE [DISTWIDTH] IN BLOOD BY AUTOMATED COUNT: 12.7 % (ref 12.3–15.4)
FOLATE SERPL-MCNC: 10.6 NG/ML (ref 4.78–24.2)
GLOBULIN UR ELPH-MCNC: 2.9 GM/DL
GLUCOSE SERPL-MCNC: 85 MG/DL (ref 65–99)
HBA1C MFR BLD: 5.4 % (ref 4.8–5.6)
HCT VFR BLD AUTO: 42.8 % (ref 34–46.6)
HDLC SERPL-MCNC: 54 MG/DL (ref 40–60)
HGB BLD-MCNC: 14.3 G/DL (ref 12–15.9)
HOLD SPECIMEN: NORMAL
IMM GRANULOCYTES # BLD AUTO: 0.02 10*3/MM3 (ref 0–0.05)
IMM GRANULOCYTES NFR BLD AUTO: 0.3 % (ref 0–0.5)
IRON 24H UR-MRATE: 84 MCG/DL (ref 37–145)
IRON SATN MFR SERPL: 23 % (ref 20–50)
LDLC SERPL CALC-MCNC: 70 MG/DL (ref 0–100)
LDLC/HDLC SERPL: 1.3 {RATIO}
LYMPHOCYTES # BLD AUTO: 1.63 10*3/MM3 (ref 0.7–3.1)
LYMPHOCYTES NFR BLD AUTO: 24.1 % (ref 19.6–45.3)
MCH RBC QN AUTO: 30.4 PG (ref 26.6–33)
MCHC RBC AUTO-ENTMCNC: 33.4 G/DL (ref 31.5–35.7)
MCV RBC AUTO: 91.1 FL (ref 79–97)
MONOCYTES # BLD AUTO: 0.52 10*3/MM3 (ref 0.1–0.9)
MONOCYTES NFR BLD AUTO: 7.7 % (ref 5–12)
NEUTROPHILS NFR BLD AUTO: 4.24 10*3/MM3 (ref 1.7–7)
NEUTROPHILS NFR BLD AUTO: 62.9 % (ref 42.7–76)
NRBC BLD AUTO-RTO: 0 /100 WBC (ref 0–0.2)
PLATELET # BLD AUTO: 281 10*3/MM3 (ref 140–450)
PMV BLD AUTO: 10.5 FL (ref 6–12)
POTASSIUM SERPL-SCNC: 3.9 MMOL/L (ref 3.5–5.2)
PROT SERPL-MCNC: 7 G/DL (ref 6–8.5)
RBC # BLD AUTO: 4.7 10*6/MM3 (ref 3.77–5.28)
SODIUM SERPL-SCNC: 137 MMOL/L (ref 136–145)
T4 FREE SERPL-MCNC: 1.21 NG/DL (ref 0.92–1.68)
TIBC SERPL-MCNC: 362 MCG/DL (ref 298–536)
TRANSFERRIN SERPL-MCNC: 243 MG/DL (ref 200–360)
TRIGL SERPL-MCNC: 64 MG/DL (ref 0–150)
TSH SERPL DL<=0.05 MIU/L-ACNC: 1.97 UIU/ML (ref 0.27–4.2)
URATE SERPL-MCNC: 3.9 MG/DL (ref 2.4–5.7)
VIT B12 BLD-MCNC: 530 PG/ML (ref 211–946)
VLDLC SERPL-MCNC: 13 MG/DL (ref 5–40)
WBC NRBC COR # BLD AUTO: 6.75 10*3/MM3 (ref 3.4–10.8)

## 2024-09-03 PROCEDURE — 82746 ASSAY OF FOLIC ACID SERUM: CPT

## 2024-09-03 PROCEDURE — 80061 LIPID PANEL: CPT

## 2024-09-03 PROCEDURE — 80053 COMPREHEN METABOLIC PANEL: CPT

## 2024-09-03 PROCEDURE — 82306 VITAMIN D 25 HYDROXY: CPT

## 2024-09-03 PROCEDURE — 84550 ASSAY OF BLOOD/URIC ACID: CPT

## 2024-09-03 PROCEDURE — 84439 ASSAY OF FREE THYROXINE: CPT

## 2024-09-03 PROCEDURE — 84466 ASSAY OF TRANSFERRIN: CPT

## 2024-09-03 PROCEDURE — 83036 HEMOGLOBIN GLYCOSYLATED A1C: CPT

## 2024-09-03 PROCEDURE — 82607 VITAMIN B-12: CPT

## 2024-09-03 PROCEDURE — 83540 ASSAY OF IRON: CPT

## 2024-09-03 PROCEDURE — 85025 COMPLETE CBC W/AUTO DIFF WBC: CPT

## 2024-09-03 PROCEDURE — 36415 COLL VENOUS BLD VENIPUNCTURE: CPT

## 2024-09-03 PROCEDURE — 84443 ASSAY THYROID STIM HORMONE: CPT

## 2024-11-27 ENCOUNTER — TRANSCRIBE ORDERS (OUTPATIENT)
Dept: LAB | Facility: HOSPITAL | Age: 79
End: 2024-11-27
Payer: OTHER GOVERNMENT

## 2024-11-27 ENCOUNTER — LAB (OUTPATIENT)
Dept: LAB | Facility: HOSPITAL | Age: 79
End: 2024-11-27
Payer: MEDICARE

## 2024-11-27 DIAGNOSIS — E78.5 HYPERLIPIDEMIA, UNSPECIFIED HYPERLIPIDEMIA TYPE: ICD-10-CM

## 2024-11-27 DIAGNOSIS — J44.9 CHRONIC OBSTRUCTIVE PULMONARY DISEASE, UNSPECIFIED COPD TYPE: Primary | ICD-10-CM

## 2024-11-27 DIAGNOSIS — J44.9 CHRONIC OBSTRUCTIVE PULMONARY DISEASE, UNSPECIFIED COPD TYPE: ICD-10-CM

## 2024-11-27 DIAGNOSIS — K21.9 GERD WITHOUT ESOPHAGITIS: ICD-10-CM

## 2024-11-27 DIAGNOSIS — E55.9 VITAMIN D DEFICIENCY: ICD-10-CM

## 2024-11-27 DIAGNOSIS — R73.01 ELEVATED FASTING GLUCOSE: ICD-10-CM

## 2024-11-27 DIAGNOSIS — D50.9 IRON DEFICIENCY ANEMIA, UNSPECIFIED IRON DEFICIENCY ANEMIA TYPE: ICD-10-CM

## 2024-11-27 DIAGNOSIS — R53.83 OTHER FATIGUE: ICD-10-CM

## 2024-11-27 DIAGNOSIS — I10 HYPERTENSION, ESSENTIAL: ICD-10-CM

## 2024-11-27 LAB
25(OH)D3 SERPL-MCNC: 63.1 NG/ML (ref 30–100)
ALBUMIN SERPL-MCNC: 4.2 G/DL (ref 3.5–5.2)
ALBUMIN/GLOB SERPL: 1.3 G/DL
ALP SERPL-CCNC: 48 U/L (ref 39–117)
ALT SERPL W P-5'-P-CCNC: 14 U/L (ref 1–33)
ANION GAP SERPL CALCULATED.3IONS-SCNC: 10 MMOL/L (ref 5–15)
AST SERPL-CCNC: 25 U/L (ref 1–32)
BASOPHILS # BLD AUTO: 0.03 10*3/MM3 (ref 0–0.2)
BASOPHILS NFR BLD AUTO: 0.4 % (ref 0–1.5)
BILIRUB SERPL-MCNC: 0.5 MG/DL (ref 0–1.2)
BUN SERPL-MCNC: 20 MG/DL (ref 8–23)
BUN/CREAT SERPL: 18.5 (ref 7–25)
CALCIUM SPEC-SCNC: 10 MG/DL (ref 8.6–10.5)
CHLORIDE SERPL-SCNC: 100 MMOL/L (ref 98–107)
CHOLEST SERPL-MCNC: 146 MG/DL (ref 0–200)
CO2 SERPL-SCNC: 27 MMOL/L (ref 22–29)
CREAT SERPL-MCNC: 1.08 MG/DL (ref 0.57–1)
DEPRECATED RDW RBC AUTO: 41.6 FL (ref 37–54)
EGFRCR SERPLBLD CKD-EPI 2021: 53 ML/MIN/1.73
EOSINOPHIL # BLD AUTO: 0.19 10*3/MM3 (ref 0–0.4)
EOSINOPHIL NFR BLD AUTO: 2.7 % (ref 0.3–6.2)
ERYTHROCYTE [DISTWIDTH] IN BLOOD BY AUTOMATED COUNT: 12.7 % (ref 12.3–15.4)
FOLATE SERPL-MCNC: 15.6 NG/ML (ref 4.78–24.2)
GLOBULIN UR ELPH-MCNC: 3.2 GM/DL
GLUCOSE SERPL-MCNC: 78 MG/DL (ref 65–99)
HBA1C MFR BLD: 5.3 % (ref 4.8–5.6)
HCT VFR BLD AUTO: 42.7 % (ref 34–46.6)
HDLC SERPL-MCNC: 61 MG/DL (ref 40–60)
HGB BLD-MCNC: 14.3 G/DL (ref 12–15.9)
IMM GRANULOCYTES # BLD AUTO: 0.01 10*3/MM3 (ref 0–0.05)
IMM GRANULOCYTES NFR BLD AUTO: 0.1 % (ref 0–0.5)
IRON 24H UR-MRATE: 64 MCG/DL (ref 37–145)
IRON SATN MFR SERPL: 17 % (ref 20–50)
LDLC SERPL CALC-MCNC: 73 MG/DL (ref 0–100)
LDLC/HDLC SERPL: 1.2 {RATIO}
LYMPHOCYTES # BLD AUTO: 1.51 10*3/MM3 (ref 0.7–3.1)
LYMPHOCYTES NFR BLD AUTO: 21.1 % (ref 19.6–45.3)
MCH RBC QN AUTO: 30.5 PG (ref 26.6–33)
MCHC RBC AUTO-ENTMCNC: 33.5 G/DL (ref 31.5–35.7)
MCV RBC AUTO: 91 FL (ref 79–97)
MONOCYTES # BLD AUTO: 0.72 10*3/MM3 (ref 0.1–0.9)
MONOCYTES NFR BLD AUTO: 10.1 % (ref 5–12)
NEUTROPHILS NFR BLD AUTO: 4.7 10*3/MM3 (ref 1.7–7)
NEUTROPHILS NFR BLD AUTO: 65.6 % (ref 42.7–76)
NRBC BLD AUTO-RTO: 0 /100 WBC (ref 0–0.2)
PLATELET # BLD AUTO: 270 10*3/MM3 (ref 140–450)
PMV BLD AUTO: 10 FL (ref 6–12)
POTASSIUM SERPL-SCNC: 4.1 MMOL/L (ref 3.5–5.2)
PROT SERPL-MCNC: 7.4 G/DL (ref 6–8.5)
RBC # BLD AUTO: 4.69 10*6/MM3 (ref 3.77–5.28)
SODIUM SERPL-SCNC: 137 MMOL/L (ref 136–145)
T-UPTAKE NFR SERPL: 0.99 TBI (ref 0.8–1.3)
T4 SERPL-MCNC: 7.35 MCG/DL (ref 4.5–11.7)
TIBC SERPL-MCNC: 368 MCG/DL (ref 298–536)
TRANSFERRIN SERPL-MCNC: 247 MG/DL (ref 200–360)
TRIGL SERPL-MCNC: 60 MG/DL (ref 0–150)
TSH SERPL DL<=0.05 MIU/L-ACNC: 1.92 UIU/ML (ref 0.27–4.2)
URATE SERPL-MCNC: 3.7 MG/DL (ref 2.4–5.7)
VIT B12 BLD-MCNC: 483 PG/ML (ref 211–946)
VLDLC SERPL-MCNC: 12 MG/DL (ref 5–40)
WBC NRBC COR # BLD AUTO: 7.16 10*3/MM3 (ref 3.4–10.8)

## 2024-11-27 PROCEDURE — 36415 COLL VENOUS BLD VENIPUNCTURE: CPT

## 2024-11-27 PROCEDURE — 84436 ASSAY OF TOTAL THYROXINE: CPT

## 2024-11-27 PROCEDURE — 84443 ASSAY THYROID STIM HORMONE: CPT

## 2024-11-27 PROCEDURE — 80061 LIPID PANEL: CPT

## 2024-11-27 PROCEDURE — 82306 VITAMIN D 25 HYDROXY: CPT

## 2024-11-27 PROCEDURE — 84479 ASSAY OF THYROID (T3 OR T4): CPT

## 2024-11-27 PROCEDURE — 82607 VITAMIN B-12: CPT

## 2024-11-27 PROCEDURE — 80053 COMPREHEN METABOLIC PANEL: CPT

## 2024-11-27 PROCEDURE — 84550 ASSAY OF BLOOD/URIC ACID: CPT

## 2024-11-27 PROCEDURE — 83540 ASSAY OF IRON: CPT

## 2024-11-27 PROCEDURE — 84466 ASSAY OF TRANSFERRIN: CPT

## 2024-11-27 PROCEDURE — 82746 ASSAY OF FOLIC ACID SERUM: CPT

## 2024-11-27 PROCEDURE — 83036 HEMOGLOBIN GLYCOSYLATED A1C: CPT

## 2024-11-27 PROCEDURE — 85025 COMPLETE CBC W/AUTO DIFF WBC: CPT

## 2025-01-02 ENCOUNTER — HOSPITAL ENCOUNTER (OUTPATIENT)
Dept: OTHER | Facility: HOSPITAL | Age: 80
Discharge: HOME | End: 2025-01-02

## 2025-01-17 ENCOUNTER — OFFICE VISIT (OUTPATIENT)
Dept: PULMONOLOGY | Facility: CLINIC | Age: 80
End: 2025-01-17
Payer: MEDICARE

## 2025-01-17 VITALS
OXYGEN SATURATION: 96 % | DIASTOLIC BLOOD PRESSURE: 69 MMHG | HEART RATE: 56 BPM | BODY MASS INDEX: 28.1 KG/M2 | WEIGHT: 185.4 LBS | HEIGHT: 68 IN | TEMPERATURE: 97.6 F | RESPIRATION RATE: 16 BRPM | SYSTOLIC BLOOD PRESSURE: 169 MMHG

## 2025-01-17 DIAGNOSIS — R91.8 LUNG NODULES: ICD-10-CM

## 2025-01-17 DIAGNOSIS — R05.3 CHRONIC COUGH: ICD-10-CM

## 2025-01-17 DIAGNOSIS — J42 CHRONIC BRONCHITIS, UNSPECIFIED CHRONIC BRONCHITIS TYPE: Primary | ICD-10-CM

## 2025-01-17 DIAGNOSIS — R06.2 WHEEZING: ICD-10-CM

## 2025-01-17 RX ORDER — OMEPRAZOLE 40 MG/1
CAPSULE, DELAYED RELEASE ORAL
COMMUNITY
Start: 2025-01-11

## 2025-01-17 RX ORDER — SUCRALFATE 1 G/1
TABLET ORAL
COMMUNITY
Start: 2024-12-23

## 2025-01-17 NOTE — PROGRESS NOTES
Primary Care Provider  Mary Cruz, ROBE     Referring Provider  ENRIQUE Vasquez*     Chief Complaint  Cough (Unable to cough anything up x 3-4 years), Wheezing, and Establish Care (Schedule ION per Alli )    Subjective          Lolis Griffith presents to [facility]  History of Present Illness  Lolis Griffith is a 79 y.o. female patient here to establish care for pulmonary nodules.    Patient had a chest CT on 1/2/2025.  This did show a spiculated left lower lobe nodule measuring 1.5 x 1.4 cm along with a left lower lobe lateral aspect 5 mm nodule.  Dr. Carson has reviewed patient's CT scan and it is recommended the patient undergo up a robotic bronchoscopy with biopsy.  I have discussed risk and benefits with patient and spouse today in office.    Patient is a never smoker but does have secondhand smoke exposure.  She states that she was also exposed to asbestos and [place]  Her chest CT does show bronchial wall thickening consistent with bronchitis but no emphysema.  There are also moderate bronchiectatic changes noted.  She is currently on Breztri but does not feel like this is helping.  She has a chronic cough that has been ongoing for the past 3 to 4 years.  She does have an albuterol inhaler that she states she uses at the gym.  I have instructed patient to use this during episodes of wheezing, shortness of breath, coughing or even chest congestion.  She verbalizes understanding.  She is also on Zyrtec, Claritin and Singulair.  She denies any unintentional weight loss, fevers or chills.  Her shortness of breath is mild in severity, worse with exertion and improved with rest.  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  They did receive treatment at that time.     Her history of smoking is   Tobacco Use: Low Risk  (1/17/2025)    Patient History     Smoking Tobacco Use: Never      Smokeless Tobacco Use: Never     Passive Exposure: Never   .    Review of Systems   Constitutional:  Negative for chills, fatigue, fever, unexpected weight gain and unexpected weight loss.   HENT:  Congestion: Nasal.    Respiratory:  Positive for cough and wheezing. Negative for apnea and shortness of breath.         Negative for Hemoptysis     Cardiovascular:  Negative for chest pain, palpitations and leg swelling.   Skin:         Negative for cyanosis      Sleep: Negative for Excessive daytime sleepiness  Negative for morning headaches  Negative for Snoring    Family History   Family history unknown: Yes        Social History     Socioeconomic History    Marital status:    Tobacco Use    Smoking status: Never     Passive exposure: Never    Smokeless tobacco: Never   Vaping Use    Vaping status: Never Used   Substance and Sexual Activity    Alcohol use: Not Currently    Drug use: Never    Sexual activity: Defer     Birth control/protection: Surgical        Past Medical History:   Diagnosis Date    Hypertension     Other hyperlipidemia         Immunization History   Administered Date(s) Administered    Arexvy (RSV, Adults 60+ yrs) 11/08/2023    COVID-19 (MODERNA) 1st,2nd,3rd Dose Monovalent 01/21/2021, 02/24/2021    COVID-19 (MODERNA) Monovalent Original Booster 11/10/2021    COVID-19 (PFIZER) 12YRS+ (COMIRNATY) 12/26/2023, 10/17/2024    COVID-19 (PFIZER) BIVALENT 12+YRS 10/19/2022    Fluzone High-Dose 65+YRS 10/15/2024    Hepatitis A 10/25/2018    Pneumococcal Conjugate 20-Valent (PCV20) 11/08/2022    Shingrix 10/30/2024         No Known Allergies       Current Outpatient Medications:     Breztri Aerosphere 160-9-4.8 MCG/ACT aerosol inhaler, Inhale 2 puffs 2 (Two) Times a Day., Disp: , Rfl:     cetirizine (zyrTEC) 10 MG tablet, Take 1 tablet by mouth., Disp: , Rfl:     Cholecalciferol 125 MCG (5000 UT) tablet, Take 1 tablet by mouth Daily., Disp: , Rfl:     Docusate Sodium 100 MG capsule, Take 1 tablet by  mouth., Disp: , Rfl:     estradiol (ESTRACE) 0.1 MG/GM vaginal cream, USE AS DIRECTED 3 TIMES A WEEK, Disp: , Rfl:     ezetimibe (ZETIA) 10 MG tablet, Take 1 tablet by mouth Daily., Disp: , Rfl:     fenofibrate (TRICOR) 145 MG tablet, , Disp: , Rfl:     loratadine (CLARITIN) 10 MG tablet, , Disp: , Rfl:     Magnesium 250 MG tablet, Take 1 tablet by mouth Daily., Disp: , Rfl:     montelukast (SINGULAIR) 10 MG tablet, Take 1 tablet by mouth Daily., Disp: , Rfl:     omeprazole (priLOSEC) 40 MG capsule, , Disp: , Rfl:     simvastatin (ZOCOR) 20 MG tablet, Take 1 tablet by mouth Daily., Disp: , Rfl:     benzonatate (TESSALON) 100 MG capsule, Take 1 capsule by mouth 3 (Three) Times a Day As Needed for Cough. (Patient not taking: Reported on 1/17/2025), Disp: 30 capsule, Rfl: 0    choline fenofibrate (TRILIPIX) 135 MG capsule, Take 1 capsule by mouth Daily. (Patient not taking: Reported on 1/17/2025), Disp: , Rfl:     fluticasone (FLONASE) 50 MCG/ACT nasal spray, 2 sprays into the nostril(s) as directed by provider Daily. (Patient not taking: Reported on 1/17/2025), Disp: 18.2 mL, Rfl: 0    potassium chloride (MICRO-K) 10 MEQ CR capsule, , Disp: , Rfl:     saccharomyces boulardii (FLORASTOR) 250 MG capsule, Take 1 capsule by mouth. (Patient not taking: Reported on 1/17/2025), Disp: , Rfl:     sucralfate (CARAFATE) 1 g tablet, 1 TABLET ON AN EMPTY STOMACH BY MOUTH THREE TIMES DAILY FOR 14 DAYS (Patient not taking: Reported on 1/17/2025), Disp: , Rfl:     torsemide (DEMADEX) 10 MG tablet, Take 1 tablet by mouth Daily. (Patient not taking: Reported on 1/17/2025), Disp: , Rfl:      Objective   Physical Exam  Constitutional:       General: She is not in acute distress.     Appearance: Normal appearance. She is normal weight.   HENT:      Right Ear: Hearing normal.      Left Ear: Hearing normal.      Nose: No nasal tenderness or congestion.      Mouth/Throat:      Mouth: Mucous membranes are moist. No oral lesions.   Eyes:       "Extraocular Movements: Extraocular movements intact.      Pupils: Pupils are equal, round, and reactive to light.   Cardiovascular:      Rate and Rhythm: Normal rate and regular rhythm.      Pulses: Normal pulses.      Heart sounds: Normal heart sounds. No murmur heard.  Pulmonary:      Effort: Pulmonary effort is normal.      Breath sounds: Normal breath sounds. No wheezing, rhonchi or rales.   Musculoskeletal:      Right lower leg: No edema.      Left lower leg: No edema.   Skin:     General: Skin is warm and dry.      Findings: No lesion or rash.   Neurological:      General: No focal deficit present.      Mental Status: She is alert and oriented to person, place, and time.   Psychiatric:         Mood and Affect: Affect normal. Mood is not anxious or depressed.         Vital Signs:   /69 (BP Location: Left arm, Patient Position: Sitting, Cuff Size: Adult)   Pulse 56   Temp 97.6 °F (36.4 °C) (Oral)   Resp 16   Ht 172.7 cm (68\")   Wt 84.1 kg (185 lb 6.4 oz)   SpO2 96% Comment: RA  BMI 28.19 kg/m²        Result Review :   The following data was reviewed by: ROBE Perry on 01/17/2025:  CMP          8/6/2024    06:51 9/3/2024    07:08 11/27/2024    06:40   CMP   Glucose 88  85  78    BUN 22  19  20    Creatinine 0.99  0.94  1.08    EGFR 59.2  63.0  53.0    Sodium 136  137  137    Potassium 4.1  3.9  4.1    Chloride 100  102  100    Calcium 9.8  10.1  10.0    Total Protein  7.0  7.4    Albumin 4.1  4.1  4.2    Globulin  2.9  3.2    Total Bilirubin  0.4  0.5    Alkaline Phosphatase  43  48    AST (SGOT)  26  25    ALT (SGPT)  16  14    Albumin/Globulin Ratio  1.4  1.3    BUN/Creatinine Ratio 22.2  20.2  18.5    Anion Gap 9.0  7.7  10.0      CBC w/diff          8/6/2024    06:51 9/3/2024    07:08 11/27/2024    06:40   CBC w/Diff   WBC 7.23  6.75  7.16    RBC 4.71  4.70  4.69    Hemoglobin 14.1  14.3  14.3    Hematocrit 43.1  42.8  42.7    MCV 91.5  91.1  91.0    MCH 29.9  30.4  30.5    MCHC 32.7  " 33.4  33.5    RDW 12.8  12.7  12.7    Platelets 258  281  270    Neutrophil Rel % 63.7  62.9  65.6    Immature Granulocyte Rel % 0.4  0.3  0.1    Lymphocyte Rel % 22.3  24.1  21.1    Monocyte Rel % 7.9  7.7  10.1    Eosinophil Rel % 5.1  4.4  2.7    Basophil Rel % 0.6  0.6  0.4      Data reviewed : Radiologic studies CT 1/2/2025    Procedures        Assessment and Plan    Diagnoses and all orders for this visit:    1. Chronic bronchitis, unspecified chronic bronchitis type (Primary)  Comments:  continue Breztri    2. Lung nodules  Comments:  ION bronchoscopy scheduled  Orders:  -     CT Chest Without Contrast; Future  -     Case Request; Standing  -     Follow Anesthesia Guidlines / Standing Orders; Standing  -     Follow Anesthesia Guidelines / Protocol; Future  -     Case Request    3. Chronic cough  Comments:  continue Breztri and albuterol    4. Wheezing  Comments:  Continue albuterol    I have obtained consent for robotic bronchoscopy with brushings, biopsies, and bronchioloalveolar lavage.  Risks and benefits of bronchoscopy discussed with patient today.  Risks include pneumothorax, hemothorax, bleeding, hypoxia, required mechanical ventilation and death.  The patient recognizes these findings, acknowledges these findings and is agreeable to the procedure.       I spent 67 minutes caring for Lolis on this date of service. This time includes time spent by me in the following activities:preparing for the visit, reviewing tests, obtaining and/or reviewing a separately obtained history, performing a medically appropriate examination and/or evaluation , counseling and educating the patient/family/caregiver, ordering medications, tests, or procedures, referring and communicating with other health care professionals , documenting information in the medical record, and care coordination    Follow Up   Return for bronch follow up.  Patient was given instructions and counseling regarding her condition or for health  maintenance advice. Please see specific information pulled into the AVS if appropriate.

## 2025-01-17 NOTE — H&P (VIEW-ONLY)
Primary Care Provider  Mary Cruz, ROBE     Referring Provider  ENRIQUE Vasquez*     Chief Complaint  Cough (Unable to cough anything up x 3-4 years), Wheezing, and Establish Care (Schedule ION per Alli )    Subjective          Lolis Griffith presents to [facility] PULMONARY & CRITICAL CARE MEDICINE  History of Present Illness  Lolis Griffith is a 79 y.o. female patient here to establish care for pulmonary nodules.    Patient had a chest CT on 1/2/2025.  This did show a spiculated left lower lobe nodule measuring 1.5 x 1.4 cm along with a left lower lobe lateral aspect 5 mm nodule.  Dr. Carson has reviewed patient's CT scan and it is recommended the patient undergo up a robotic bronchoscopy with biopsy.  I have discussed risk and benefits with patient and spouse today in office.    Patient is a never smoker but does have secondhand smoke exposure.  She states that she was also exposed to asbestos and [place].  Her chest CT does show bronchial wall thickening consistent with bronchitis but no emphysema.  There are also moderate bronchiectatic changes noted.  She is currently on Breztri but does not feel like this is helping.  She has a chronic cough that has been ongoing for the past 3 to 4 years.  She does have an albuterol inhaler that she states she uses at the gym.  I have instructed patient to use this during episodes of wheezing, shortness of breath, coughing or even chest congestion.  She verbalizes understanding.  She is also on Zyrtec, Claritin and Singulair.  She denies any unintentional weight loss, fevers or chills.  Her shortness of breath is mild in severity, worse with exertion and improved with rest.  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  They did receive treatment at that time.     Her history of smoking is   Tobacco Use: Low Risk  (1/17/2025)    Patient History     Smoking Tobacco Use: Never      Smokeless Tobacco Use: Never     Passive Exposure: Never   .    Review of Systems   Constitutional:  Negative for chills, fatigue, fever, unexpected weight gain and unexpected weight loss.   HENT:  Congestion: Nasal.    Respiratory:  Positive for cough and wheezing. Negative for apnea and shortness of breath.         Negative for Hemoptysis     Cardiovascular:  Negative for chest pain, palpitations and leg swelling.   Skin:         Negative for cyanosis      Sleep: Negative for Excessive daytime sleepiness  Negative for morning headaches  Negative for Snoring    Family History   Family history unknown: Yes        Social History     Socioeconomic History    Marital status:    Tobacco Use    Smoking status: Never     Passive exposure: Never    Smokeless tobacco: Never   Vaping Use    Vaping status: Never Used   Substance and Sexual Activity    Alcohol use: Not Currently    Drug use: Never    Sexual activity: Defer     Birth control/protection: Surgical        Past Medical History:   Diagnosis Date    Hypertension     Other hyperlipidemia         Immunization History   Administered Date(s) Administered    Arexvy (RSV, Adults 60+ yrs) 11/08/2023    COVID-19 (MODERNA) 1st,2nd,3rd Dose Monovalent 01/21/2021, 02/24/2021    COVID-19 (MODERNA) Monovalent Original Booster 11/10/2021    COVID-19 (PFIZER) 12YRS+ (COMIRNATY) 12/26/2023, 10/17/2024    COVID-19 (PFIZER) BIVALENT 12+YRS 10/19/2022    Fluzone High-Dose 65+YRS 10/15/2024    Hepatitis A 10/25/2018    Pneumococcal Conjugate 20-Valent (PCV20) 11/08/2022    Shingrix 10/30/2024         No Known Allergies       Current Outpatient Medications:     Breztri Aerosphere 160-9-4.8 MCG/ACT aerosol inhaler, Inhale 2 puffs 2 (Two) Times a Day., Disp: , Rfl:     cetirizine (zyrTEC) 10 MG tablet, Take 1 tablet by mouth., Disp: , Rfl:     Cholecalciferol 125 MCG (5000 UT) tablet, Take 1 tablet by mouth Daily., Disp: , Rfl:     Docusate Sodium 100 MG capsule, Take 1 tablet by  mouth., Disp: , Rfl:     estradiol (ESTRACE) 0.1 MG/GM vaginal cream, USE AS DIRECTED 3 TIMES A WEEK, Disp: , Rfl:     ezetimibe (ZETIA) 10 MG tablet, Take 1 tablet by mouth Daily., Disp: , Rfl:     fenofibrate (TRICOR) 145 MG tablet, , Disp: , Rfl:     loratadine (CLARITIN) 10 MG tablet, , Disp: , Rfl:     Magnesium 250 MG tablet, Take 1 tablet by mouth Daily., Disp: , Rfl:     montelukast (SINGULAIR) 10 MG tablet, Take 1 tablet by mouth Daily., Disp: , Rfl:     omeprazole (priLOSEC) 40 MG capsule, , Disp: , Rfl:     simvastatin (ZOCOR) 20 MG tablet, Take 1 tablet by mouth Daily., Disp: , Rfl:     benzonatate (TESSALON) 100 MG capsule, Take 1 capsule by mouth 3 (Three) Times a Day As Needed for Cough. (Patient not taking: Reported on 1/17/2025), Disp: 30 capsule, Rfl: 0    choline fenofibrate (TRILIPIX) 135 MG capsule, Take 1 capsule by mouth Daily. (Patient not taking: Reported on 1/17/2025), Disp: , Rfl:     fluticasone (FLONASE) 50 MCG/ACT nasal spray, 2 sprays into the nostril(s) as directed by provider Daily. (Patient not taking: Reported on 1/17/2025), Disp: 18.2 mL, Rfl: 0    potassium chloride (MICRO-K) 10 MEQ CR capsule, , Disp: , Rfl:     saccharomyces boulardii (FLORASTOR) 250 MG capsule, Take 1 capsule by mouth. (Patient not taking: Reported on 1/17/2025), Disp: , Rfl:     sucralfate (CARAFATE) 1 g tablet, 1 TABLET ON AN EMPTY STOMACH BY MOUTH THREE TIMES DAILY FOR 14 DAYS (Patient not taking: Reported on 1/17/2025), Disp: , Rfl:     torsemide (DEMADEX) 10 MG tablet, Take 1 tablet by mouth Daily. (Patient not taking: Reported on 1/17/2025), Disp: , Rfl:      Objective   Physical Exam  Constitutional:       General: She is not in acute distress.     Appearance: Normal appearance. She is normal weight.   HENT:      Right Ear: Hearing normal.      Left Ear: Hearing normal.      Nose: No nasal tenderness or congestion.      Mouth/Throat:      Mouth: Mucous membranes are moist. No oral lesions.   Eyes:       "Extraocular Movements: Extraocular movements intact.      Pupils: Pupils are equal, round, and reactive to light.   Cardiovascular:      Rate and Rhythm: Normal rate and regular rhythm.      Pulses: Normal pulses.      Heart sounds: Normal heart sounds. No murmur heard.  Pulmonary:      Effort: Pulmonary effort is normal.      Breath sounds: Normal breath sounds. No wheezing, rhonchi or rales.   Musculoskeletal:      Right lower leg: No edema.      Left lower leg: No edema.   Skin:     General: Skin is warm and dry.      Findings: No lesion or rash.   Neurological:      General: No focal deficit present.      Mental Status: She is alert and oriented to person, place, and time.   Psychiatric:         Mood and Affect: Affect normal. Mood is not anxious or depressed.         Vital Signs:   /69 (BP Location: Left arm, Patient Position: Sitting, Cuff Size: Adult)   Pulse 56   Temp 97.6 °F (36.4 °C) (Oral)   Resp 16   Ht 172.7 cm (68\")   Wt 84.1 kg (185 lb 6.4 oz)   SpO2 96% Comment: RA  BMI 28.19 kg/m²        Result Review :   The following data was reviewed by: ROBE Perry on 01/17/2025:  CMP          8/6/2024    06:51 9/3/2024    07:08 11/27/2024    06:40   CMP   Glucose 88  85  78    BUN 22  19  20    Creatinine 0.99  0.94  1.08    EGFR 59.2  63.0  53.0    Sodium 136  137  137    Potassium 4.1  3.9  4.1    Chloride 100  102  100    Calcium 9.8  10.1  10.0    Total Protein  7.0  7.4    Albumin 4.1  4.1  4.2    Globulin  2.9  3.2    Total Bilirubin  0.4  0.5    Alkaline Phosphatase  43  48    AST (SGOT)  26  25    ALT (SGPT)  16  14    Albumin/Globulin Ratio  1.4  1.3    BUN/Creatinine Ratio 22.2  20.2  18.5    Anion Gap 9.0  7.7  10.0      CBC w/diff          8/6/2024    06:51 9/3/2024    07:08 11/27/2024    06:40   CBC w/Diff   WBC 7.23  6.75  7.16    RBC 4.71  4.70  4.69    Hemoglobin 14.1  14.3  14.3    Hematocrit 43.1  42.8  42.7    MCV 91.5  91.1  91.0    MCH 29.9  30.4  30.5    MCHC 32.7  " 33.4  33.5    RDW 12.8  12.7  12.7    Platelets 258  281  270    Neutrophil Rel % 63.7  62.9  65.6    Immature Granulocyte Rel % 0.4  0.3  0.1    Lymphocyte Rel % 22.3  24.1  21.1    Monocyte Rel % 7.9  7.7  10.1    Eosinophil Rel % 5.1  4.4  2.7    Basophil Rel % 0.6  0.6  0.4      Data reviewed : Radiologic studies CT 1/2/2025    Procedures        Assessment and Plan    Diagnoses and all orders for this visit:    1. Chronic bronchitis, unspecified chronic bronchitis type (Primary)  Comments:  continue Breztri    2. Lung nodules  Comments:  ION bronchoscopy scheduled  Orders:  -     CT Chest Without Contrast; Future  -     Case Request; Standing  -     Follow Anesthesia Guidlines / Standing Orders; Standing  -     Follow Anesthesia Guidelines / Protocol; Future  -     Case Request    3. Chronic cough  Comments:  continue Breztri and albuterol    4. Wheezing  Comments:  Continue albuterol    I have obtained consent for robotic bronchoscopy with brushings, biopsies, and bronchioloalveolar lavage.  Risks and benefits of bronchoscopy discussed with patient today.  Risks include pneumothorax, hemothorax, bleeding, hypoxia, required mechanical ventilation and death.  The patient recognizes these findings, acknowledges these findings and is agreeable to the procedure.       I spent 67 minutes caring for Lolis on this date of service. This time includes time spent by me in the following activities:preparing for the visit, reviewing tests, obtaining and/or reviewing a separately obtained history, performing a medically appropriate examination and/or evaluation , counseling and educating the patient/family/caregiver, ordering medications, tests, or procedures, referring and communicating with other health care professionals , documenting information in the medical record, and care coordination    Follow Up   Return for bronch follow up.  Patient was given instructions and counseling regarding her condition or for health  maintenance advice. Please see specific information pulled into the AVS if appropriate.

## 2025-01-27 NOTE — PRE-PROCEDURE INSTRUCTIONS
/IMPORTANT INSTRUCTIONS - PRE-ADMISSION TESTING  DO NOT EAT OR CHEW anything after midnight the night before your procedure.      Take the following medications the morning of your procedure with JUST A SIP OF WATER:  ________ZYRTEC, ZETIA, TRICOR, CLAIRITN, PRILOSEC, SNGULAIR _______________________________________________________________________________________________________________________________________________________________________________    DO NOT BRING your medications to the hospital with you, UNLESS something has changed since your PRE-Admission Testing appointment.  Hold all vitamins, supplements, and NSAIDS (Non- steroidal anti-inflammatory meds) for one week prior to surgery (you MAY take Tylenol or Acetaminophen).  If you are diabetic, check your blood sugar the morning of your procedure. If it is less than 70 or if you are feeling symptomatic, call the following number for further instructions: [phone]_.  Use your inhalers/nebulizers as usual, the morning of your procedure. BRING YOUR INHALERS with you.   Bring your CPAP or BIPAP to hospital, ONLY IF YOU WILL BE SPENDING THE NIGHT.   Make sure you have a ride home and have someone who will stay with you the day of your procedure after you go home.  If you have any questions, please call your Pre-Admission Testing Nurse, NATANAEL___ at [phone]_____.   Per anesthesia request, do not smoke for 24 hours before your procedure or as instructed by your surgeon.

## 2025-01-30 ENCOUNTER — APPOINTMENT (OUTPATIENT)
Dept: CT IMAGING | Facility: HOSPITAL | Age: 80
End: 2025-01-30
Payer: MEDICARE

## 2025-01-30 ENCOUNTER — HOSPITAL ENCOUNTER (OUTPATIENT)
Dept: CT IMAGING | Facility: HOSPITAL | Age: 80
Discharge: HOME | End: 2025-01-30
Payer: MEDICARE

## 2025-01-30 ENCOUNTER — ANESTHESIA (OUTPATIENT)
Dept: PERIOP | Facility: HOSPITAL | Age: 80
End: 2025-01-30
Payer: MEDICARE

## 2025-01-30 ENCOUNTER — HOSPITAL ENCOUNTER (OUTPATIENT)
Facility: HOSPITAL | Age: 80
Setting detail: HOSPITAL OUTPATIENT SURGERY
Discharge: HOME | End: 2025-01-30
Attending: INTERNAL MEDICINE | Admitting: INTERNAL MEDICINE
Payer: MEDICARE

## 2025-01-30 ENCOUNTER — APPOINTMENT (OUTPATIENT)
Dept: GENERAL RADIOLOGY | Facility: HOSPITAL | Age: 80
End: 2025-01-30
Payer: MEDICARE

## 2025-01-30 ENCOUNTER — ANESTHESIA EVENT (OUTPATIENT)
Dept: PERIOP | Facility: HOSPITAL | Age: 80
End: 2025-01-30
Payer: MEDICARE

## 2025-01-30 VITALS
TEMPERATURE: 97 F | BODY MASS INDEX: 27 KG/M2 | RESPIRATION RATE: 17 BRPM | HEART RATE: 64 BPM | HEIGHT: 68 IN | WEIGHT: 178.13 LBS | SYSTOLIC BLOOD PRESSURE: 132 MMHG | DIASTOLIC BLOOD PRESSURE: 68 MMHG | OXYGEN SATURATION: 97 %

## 2025-01-30 DIAGNOSIS — R91.8 LUNG NODULES: ICD-10-CM

## 2025-01-30 LAB
ACB CMPLX DNA BAL NAA+NON-PRB-NCNCRNG: NOT DETECTED
BLACTX-M ISLT/SPM QL: NOT DETECTED
BLAIMP ISLT/SPM QL: NOT DETECTED
BLAKPC ISLT/SPM QL: NOT DETECTED
BLAOXA-48-LIKE ISLT/SPM QL: ABNORMAL
BLAVIM ISLT/SPM QL: NOT DETECTED
C PNEUM DNA NPH QL NAA+NON-PROBE: NOT DETECTED
CILIATED BAL QL: 5 %
E CLOAC COMP DNA BAL NAA+NON-PRB-NCNCRNG: NOT DETECTED
E COLI DNA BAL NAA+NON-PRB-NCNCRNG: NOT DETECTED
EOSINOPHIL NFR FLD MANUAL: 2 %
FLUAV SUBTYP SPEC NAA+PROBE: NOT DETECTED
FLUBV RNA ISLT QL NAA+PROBE: NOT DETECTED
GP B STREP DNA BAL NAA+NON-PRB-NCNCRNG: NOT DETECTED
HADV DNA SPEC NAA+PROBE: NOT DETECTED
HAEM INFLU DNA BAL NAA+NON-PRB-NCNCRNG: NOT DETECTED
HCOV RNA LOWER RESP QL NAA+NON-PROBE: NOT DETECTED
HMPV RNA NPH QL NAA+NON-PROBE: NOT DETECTED
HPIV RNA LOWER RESP QL NAA+NON-PROBE: NOT DETECTED
K AEROGENES DNA BAL NAA+NON-PRB-NCNCRNG: NOT DETECTED
K OXYTOCA DNA BAL NAA+NON-PRB-NCNCRNG: NOT DETECTED
K PNEU GRP DNA BAL NAA+NON-PRB-NCNCRNG: NOT DETECTED
L PNEUMO DNA LOWER RESP QL NAA+NON-PROBE: NOT DETECTED
LYMPHOCYTES NFR FLD MANUAL: 12 %
M CATARRHALIS DNA BAL NAA+NON-PRB-NCNCRNG: NOT DETECTED
M PNEUMO IGG SER IA-ACNC: NOT DETECTED
MECA+MECC ISLT/SPM QL: ABNORMAL
NDM GENE: NOT DETECTED
NEUTROPHILS NFR FLD MANUAL: 81 %
P AERUGINOSA DNA BAL NAA+NON-PRB-NCNCRNG: DETECTED
PROTEUS SP DNA BAL NAA+NON-PRB-NCNCRNG: NOT DETECTED
RHINOVIRUS RNA SPEC NAA+PROBE: DETECTED
RSV RNA NPH QL NAA+NON-PROBE: NOT DETECTED
S AUREUS DNA BAL NAA+NON-PRB-NCNCRNG: NOT DETECTED
S MARCESCENS DNA BAL NAA+NON-PRB-NCNCRNG: NOT DETECTED
S PNEUM DNA BAL NAA+NON-PRB-NCNCRNG: NOT DETECTED
S PYO DNA BAL NAA+NON-PRB-NCNCRNG: NOT DETECTED
VISUAL PRESENCE OF BLOOD: PRESENT

## 2025-01-30 PROCEDURE — 71250 CT THORAX DX C-: CPT

## 2025-01-30 PROCEDURE — 88342 IMHCHEM/IMCYTCHM 1ST ANTB: CPT | Performed by: INTERNAL MEDICINE

## 2025-01-30 PROCEDURE — 25010000002 DEXAMETHASONE PER 1 MG

## 2025-01-30 PROCEDURE — 25010000002 ONDANSETRON PER 1 MG

## 2025-01-30 PROCEDURE — 87077 CULTURE AEROBIC IDENTIFY: CPT | Performed by: INTERNAL MEDICINE

## 2025-01-30 PROCEDURE — 25010000002 PROPOFOL 10 MG/ML EMULSION

## 2025-01-30 PROCEDURE — 31628 BRONCHOSCOPY/LUNG BX EACH: CPT | Performed by: INTERNAL MEDICINE

## 2025-01-30 PROCEDURE — 25010000002 MIDAZOLAM PER 1MG: Performed by: ANESTHESIOLOGY

## 2025-01-30 PROCEDURE — 87102 FUNGUS ISOLATION CULTURE: CPT | Performed by: INTERNAL MEDICINE

## 2025-01-30 PROCEDURE — 25010000002 LIDOCAINE PF 2% 2 % SOLUTION

## 2025-01-30 PROCEDURE — 93005 ELECTROCARDIOGRAM TRACING: CPT | Performed by: ANESTHESIOLOGY

## 2025-01-30 PROCEDURE — 31654 BRONCH EBUS IVNTJ PERPH LES: CPT | Performed by: INTERNAL MEDICINE

## 2025-01-30 PROCEDURE — 25010000002 SUGAMMADEX 200 MG/2ML SOLUTION

## 2025-01-30 PROCEDURE — 31629 BRONCHOSCOPY/NEEDLE BX EACH: CPT | Performed by: INTERNAL MEDICINE

## 2025-01-30 PROCEDURE — 31627 NAVIGATIONAL BRONCHOSCOPY: CPT | Performed by: INTERNAL MEDICINE

## 2025-01-30 PROCEDURE — 87071 CULTURE AEROBIC QUANT OTHER: CPT | Performed by: INTERNAL MEDICINE

## 2025-01-30 PROCEDURE — 87116 MYCOBACTERIA CULTURE: CPT | Performed by: INTERNAL MEDICINE

## 2025-01-30 PROCEDURE — 88108 CYTOPATH CONCENTRATE TECH: CPT | Performed by: INTERNAL MEDICINE

## 2025-01-30 PROCEDURE — 76000 FLUOROSCOPY <1 HR PHYS/QHP: CPT

## 2025-01-30 PROCEDURE — 88173 CYTOPATH EVAL FNA REPORT: CPT | Performed by: INTERNAL MEDICINE

## 2025-01-30 PROCEDURE — 87633 RESP VIRUS 12-25 TARGETS: CPT | Performed by: INTERNAL MEDICINE

## 2025-01-30 PROCEDURE — 88341 IMHCHEM/IMCYTCHM EA ADD ANTB: CPT | Performed by: INTERNAL MEDICINE

## 2025-01-30 PROCEDURE — 31624 DX BRONCHOSCOPE/LAVAGE: CPT | Performed by: INTERNAL MEDICINE

## 2025-01-30 PROCEDURE — 87205 SMEAR GRAM STAIN: CPT | Performed by: INTERNAL MEDICINE

## 2025-01-30 PROCEDURE — 87206 SMEAR FLUORESCENT/ACID STAI: CPT | Performed by: INTERNAL MEDICINE

## 2025-01-30 PROCEDURE — 25810000003 LACTATED RINGERS PER 1000 ML: Performed by: ANESTHESIOLOGY

## 2025-01-30 PROCEDURE — 89051 BODY FLUID CELL COUNT: CPT | Performed by: INTERNAL MEDICINE

## 2025-01-30 PROCEDURE — 88305 TISSUE EXAM BY PATHOLOGIST: CPT | Performed by: INTERNAL MEDICINE

## 2025-01-30 PROCEDURE — 87186 SC STD MICRODIL/AGAR DIL: CPT | Performed by: INTERNAL MEDICINE

## 2025-01-30 RX ORDER — PROPOFOL 10 MG/ML
VIAL (ML) INTRAVENOUS AS NEEDED
Status: DISCONTINUED | OUTPATIENT
Start: 2025-01-30 | End: 2025-01-30 | Stop reason: SURG

## 2025-01-30 RX ORDER — ROCURONIUM BROMIDE 10 MG/ML
INJECTION, SOLUTION INTRAVENOUS AS NEEDED
Status: DISCONTINUED | OUTPATIENT
Start: 2025-01-30 | End: 2025-01-30 | Stop reason: SURG

## 2025-01-30 RX ORDER — PHENYLEPHRINE HCL IN 0.9% NACL 1 MG/10 ML
SYRINGE (ML) INTRAVENOUS AS NEEDED
Status: DISCONTINUED | OUTPATIENT
Start: 2025-01-30 | End: 2025-01-30 | Stop reason: SURG

## 2025-01-30 RX ORDER — ONDANSETRON 2 MG/ML
4 INJECTION INTRAMUSCULAR; INTRAVENOUS ONCE AS NEEDED
Status: DISCONTINUED | OUTPATIENT
Start: 2025-01-30 | End: 2025-01-30 | Stop reason: HOSPADM

## 2025-01-30 RX ORDER — PROMETHAZINE HYDROCHLORIDE 25 MG/1
25 TABLET ORAL ONCE AS NEEDED
Status: DISCONTINUED | OUTPATIENT
Start: 2025-01-30 | End: 2025-01-30 | Stop reason: HOSPADM

## 2025-01-30 RX ORDER — MIDAZOLAM HYDROCHLORIDE 2 MG/2ML
0.5 INJECTION, SOLUTION INTRAMUSCULAR; INTRAVENOUS ONCE
Status: COMPLETED | OUTPATIENT
Start: 2025-01-30 | End: 2025-01-30

## 2025-01-30 RX ORDER — SODIUM CHLORIDE, SODIUM LACTATE, POTASSIUM CHLORIDE, CALCIUM CHLORIDE 600; 310; 30; 20 MG/100ML; MG/100ML; MG/100ML; MG/100ML
9 INJECTION, SOLUTION INTRAVENOUS CONTINUOUS PRN
Status: DISCONTINUED | OUTPATIENT
Start: 2025-01-30 | End: 2025-01-30 | Stop reason: HOSPADM

## 2025-01-30 RX ORDER — LIDOCAINE HYDROCHLORIDE 20 MG/ML
INJECTION, SOLUTION EPIDURAL; INFILTRATION; INTRACAUDAL; PERINEURAL AS NEEDED
Status: DISCONTINUED | OUTPATIENT
Start: 2025-01-30 | End: 2025-01-30 | Stop reason: SURG

## 2025-01-30 RX ORDER — PROMETHAZINE HYDROCHLORIDE 25 MG/1
25 SUPPOSITORY RECTAL ONCE AS NEEDED
Status: DISCONTINUED | OUTPATIENT
Start: 2025-01-30 | End: 2025-01-30 | Stop reason: HOSPADM

## 2025-01-30 RX ORDER — ONDANSETRON 2 MG/ML
INJECTION INTRAMUSCULAR; INTRAVENOUS AS NEEDED
Status: DISCONTINUED | OUTPATIENT
Start: 2025-01-30 | End: 2025-01-30 | Stop reason: SURG

## 2025-01-30 RX ORDER — DEXAMETHASONE SODIUM PHOSPHATE 4 MG/ML
INJECTION, SOLUTION INTRA-ARTICULAR; INTRALESIONAL; INTRAMUSCULAR; INTRAVENOUS; SOFT TISSUE AS NEEDED
Status: DISCONTINUED | OUTPATIENT
Start: 2025-01-30 | End: 2025-01-30 | Stop reason: SURG

## 2025-01-30 RX ORDER — CALCIUM CHLORIDE 100 MG/ML
INJECTION INTRAVENOUS; INTRAVENTRICULAR AS NEEDED
Status: DISCONTINUED | OUTPATIENT
Start: 2025-01-30 | End: 2025-01-30 | Stop reason: SURG

## 2025-01-30 RX ORDER — OXYCODONE HYDROCHLORIDE 5 MG/1
5 TABLET ORAL
Status: DISCONTINUED | OUTPATIENT
Start: 2025-01-30 | End: 2025-01-30 | Stop reason: HOSPADM

## 2025-01-30 RX ORDER — EPHEDRINE SULFATE 50 MG/ML
INJECTION INTRAVENOUS AS NEEDED
Status: DISCONTINUED | OUTPATIENT
Start: 2025-01-30 | End: 2025-01-30 | Stop reason: SURG

## 2025-01-30 RX ADMIN — SODIUM CHLORIDE, POTASSIUM CHLORIDE, SODIUM LACTATE AND CALCIUM CHLORIDE 9 ML/HR: 600; 310; 30; 20 INJECTION, SOLUTION INTRAVENOUS at 10:46

## 2025-01-30 RX ADMIN — DEXAMETHASONE SODIUM PHOSPHATE 4 MG: 4 INJECTION, SOLUTION INTRAMUSCULAR; INTRAVENOUS at 11:12

## 2025-01-30 RX ADMIN — LIDOCAINE HYDROCHLORIDE 80 MG: 20 INJECTION, SOLUTION INTRAVENOUS at 11:06

## 2025-01-30 RX ADMIN — SUGAMMADEX 200 MG: 100 INJECTION, SOLUTION INTRAVENOUS at 12:09

## 2025-01-30 RX ADMIN — PROPOFOL 125 MCG/KG/MIN: 10 INJECTION, EMULSION INTRAVENOUS at 11:07

## 2025-01-30 RX ADMIN — EPHEDRINE SULFATE 5 MG: 50 INJECTION INTRAVENOUS at 12:01

## 2025-01-30 RX ADMIN — CALCIUM CHLORIDE INJECTION 200 MG: 100 INJECTION, SOLUTION INTRAVENOUS at 11:51

## 2025-01-30 RX ADMIN — PROPOFOL 100 MG: 10 INJECTION, EMULSION INTRAVENOUS at 11:06

## 2025-01-30 RX ADMIN — ROCURONIUM BROMIDE 20 MG: 50 INJECTION INTRAVENOUS at 11:38

## 2025-01-30 RX ADMIN — PROPOFOL 20 MG: 10 INJECTION, EMULSION INTRAVENOUS at 11:31

## 2025-01-30 RX ADMIN — Medication 200 MCG: at 11:51

## 2025-01-30 RX ADMIN — ROCURONIUM BROMIDE 50 MG: 50 INJECTION INTRAVENOUS at 11:07

## 2025-01-30 RX ADMIN — EPHEDRINE SULFATE 7.5 MG: 50 INJECTION INTRAVENOUS at 11:26

## 2025-01-30 RX ADMIN — ONDANSETRON 4 MG: 2 INJECTION INTRAMUSCULAR; INTRAVENOUS at 12:04

## 2025-01-30 RX ADMIN — MIDAZOLAM HYDROCHLORIDE 0.5 MG: 1 INJECTION, SOLUTION INTRAMUSCULAR; INTRAVENOUS at 10:46

## 2025-01-30 NOTE — ANESTHESIA POSTPROCEDURE EVALUATION
Patient: Lolis Griffith    Procedure Summary       Date: 01/30/25 Room / Location: [facility] OR 01 / [facility] MAIN OR    Anesthesia Start: 1100 Anesthesia Stop: 1225    Procedure: BRONCHOSCOPY WITH ION ROBOT: BAL, REBUS, NEEDLE ASPIRATE, BIOPSIES insertion of lighted robot navigated instrument to view inside the lung (Left: Bronchus) Diagnosis:       Lung nodules      (Lung nodules [R91.8])    Surgeons: Stevie Carson DO Provider: Stevie Hand MD    Anesthesia Type: general ASA Status: 2            Anesthesia Type: general    Vitals  Vitals Value Taken Time   /63 01/30/25 1236   Temp 35.8 °C (96.5 °F) 01/30/25 1221   Pulse 70 01/30/25 1237   Resp 22 01/30/25 1230   SpO2 93 % 01/30/25 1237   Vitals shown include unfiled device data.        Post Anesthesia Care and Evaluation    Patient location during evaluation: bedside  Patient participation: complete - patient participated  Level of consciousness: awake    Airway patency: patent  PONV Status: none  Cardiovascular status: acceptable  Respiratory status: acceptable  Hydration status: acceptable

## 2025-01-30 NOTE — OP NOTE
Robotic navigational assisted bronchoscopy with radial probe endobronchial ultrasound, transbronchial needle aspiration, transbronchial lung biopsies, brushings, bronchoalveolar lavage, bronchial washings.   Bronchoscopy with clearance of airways     Pre-Operative Diagnosis: left lower lobe nodule     Post-Operative Diagnosis: Same     Timeout performed     Anesthesia: General anesthesia     Procedure Details: The patient was consented for the procedure with all risk and benefit of the procedure explained in detail.  The patient was given the opportunity to ask questions and all concerns were answered. The bronchoscope was inserted into the main airway via the endotracheal tube. An anatomical survey was done of the main airways and the subsegmental bronchus.     Findings: First, using fiberoptic bronchoscope airway inspection was performed.  Endotracheal tube was in adequate position in the mid thoracic trachea.  The trachea was normal in caliber and had no mucosal abnormalities. The left tracheobronchial tree appeared anatomically normal with no mucosal abnormalities. The right tracheobronchial tree appeared anatomically normal with no mucosal abnormalities.  All airways were evaluated and cleared.     Next the fiberoptic bronchoscope was removed and Ion robotic navigational bronchoscope was inserted into the endotracheal tube.  Using navigational guidance, we approach to the pulmonary nodule in the left lower lobe of lung.  Identification of left lower lobe pulmonary nodule and appropriate positioning was confirmed via radial probe EBUS and fluoroscopy.  Under fluoroscopic guidance, transbronchial needle aspiration was performed at the nodule in the left lower lobe of the lung.  Radial probe EBUS was then inserted to again confirm appropriate position under fluoroscopic guidance.  Transbronchial lung biopsies were performed under fluoroscopic guidance at the nodule in the left lower lobe of the lung. Radial  probe EBUS was then inserted to again confirm appropriate position under fluoroscopic guidance. . A bronchoalveolar lavage was performed obtained from the left lower lobe bronchus.      Findings:  Concentric view pulmonary nodule  Copious amounts of mucopurulent secretions seen throughout the entire tracheobronchial tree     Estimated Blood Loss: Less than 5 mL mL     Specimens:  Transbronchial fine-needle aspiration of the left lower lobe of the lung  Transbronchial lung biopsy left lower lobe lung  Bronchoalveolar lavage left lower lobe bronchus     Complications: no immediate observed ; patient tolerated the procedure well. Will go to recover  to be monitored      Disposition: Discharged home.  Follow-up test results.

## 2025-01-30 NOTE — ANESTHESIA PREPROCEDURE EVALUATION
Anesthesia Evaluation     Patient summary reviewed and Nursing notes reviewed                Airway   Mallampati: I  TM distance: >3 FB  Neck ROM: full  No difficulty expected  Dental      Pulmonary - negative pulmonary ROS and normal exam    breath sounds clear to auscultation  Cardiovascular - negative cardio ROS and normal exam    Rhythm: regular  Rate: normal    (+) hypertension, hyperlipidemia      Neuro/Psych- negative ROS  GI/Hepatic/Renal/Endo - negative ROS     Musculoskeletal (-) negative ROS    Abdominal    Substance History - negative use     OB/GYN negative ob/gyn ROS         Other        ROS/Med Hx Other: Lung nodules              Anesthesia Plan    ASA 2     general     intravenous induction     Anesthetic plan, risks, benefits, and alternatives have been provided, discussed and informed consent has been obtained with: patient.    CODE STATUS:

## 2025-01-31 LAB
NIGHT BLUE STAIN TISS: NORMAL
NIGHT BLUE STAIN TISS: NORMAL

## 2025-02-01 LAB
BACTERIA SPEC AEROBE CULT: ABNORMAL
BACTERIA SPEC AEROBE CULT: ABNORMAL
GRAM STN SPEC: ABNORMAL
GRAM STN SPEC: ABNORMAL

## 2025-02-02 LAB
QT INTERVAL: 424 MS
QTC INTERVAL: 421 MS

## 2025-02-03 LAB
CYTO UR: NORMAL
LAB AP CASE REPORT: NORMAL
LAB AP CLINICAL INFORMATION: NORMAL
PATH REPORT.FINAL DX SPEC: NORMAL
PATH REPORT.GROSS SPEC: NORMAL

## 2025-02-05 NOTE — PROGRESS NOTES
Primary Care Provider  Mary Cruz APRN     Referring Provider  No ref. provider found       Patient or patient representative verbalized consent for the use of Ambient Listening during the visit with  ROBE Perry for chart documentation. 2/6/2025  08:24 EST    Chief Complaint  Cough (Dry cough ) and Follow-up (Bronch f/up - 1/30 )    Subjective          Lolis Griffith presents to [facility]  History of Present Illness  Lolis Griffith is a 79 y.o. female patient o here for management of pulmonary nodules.    Patient recently underwent a robotic bronchoscopy with Dr. Carson on 1/30/2025.  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  Her BAL was also positive for Pseudomonas and Dr. Carson is wanting to proceed with 10 days of cefepime 2 g every 12 hours.    History of Present Illness  The patient is a 79-year-old female who presents for evaluation of lung cancer and pseudomonas infection.    She underwent a bronchoscopy last week, which she tolerated well. She reports no history of pacemaker implantation or metallic foreign bodies in her body. However, she has a history of left hip replacement. She is curious about the potential need for radiation therapy post-surgery. She also inquires about the possibility of travel during her treatment period, as she has a planned cruise from 03/06/2025 to 03/18/2025. She experiences occasional coughing, sometimes productive of brownish sputum, and at other times, a dry cough. She also reports frequent sinus infections and questions if these could be related to her bacterial infection. She experienced a headache the previous night, which was alleviated with Tylenol. She does not typically suffer from headaches.       Her history of smoking is   Tobacco Use: Low Risk  (2/6/2025)    Patient History     Smoking Tobacco Use: Never     Smokeless Tobacco Use: Never      Passive Exposure: Never   .    Review of Systems   Constitutional:  Negative for chills, fatigue, fever, unexpected weight gain and unexpected weight loss.   HENT:  Congestion: Nasal.    Respiratory:  Positive for cough. Negative for apnea, shortness of breath and wheezing.         Negative for Hemoptysis     Cardiovascular:  Negative for chest pain, palpitations and leg swelling.   Skin:         Negative for cyanosis      Sleep: Negative for Excessive daytime sleepiness  Negative for morning headaches  Negative for Snoring    Family History   Family history unknown: Yes        Social History     Socioeconomic History    Marital status:    Tobacco Use    Smoking status: Never     Passive exposure: Never    Smokeless tobacco: Never   Vaping Use    Vaping status: Never Used   Substance and Sexual Activity    Alcohol use: Not Currently    Drug use: Never    Sexual activity: Defer     Birth control/protection: Surgical        Past Medical History:   Diagnosis Date    History of sinus problem     Hypertension     Other hyperlipidemia         Immunization History   Administered Date(s) Administered    Arexvy (RSV, Adults 60+ yrs) 11/08/2023    COVID-19 (MODERNA) 1st,2nd,3rd Dose Monovalent 01/21/2021, 02/24/2021    COVID-19 (MODERNA) Monovalent Original Booster 11/10/2021    COVID-19 (PFIZER) 12YRS+ (COMIRNATY) 12/26/2023, 10/17/2024    COVID-19 (PFIZER) BIVALENT 12+YRS 10/19/2022    Fluzone High-Dose 65+YRS 10/15/2024    Hepatitis A 10/25/2018    Pneumococcal Conjugate 20-Valent (PCV20) 11/08/2022    Shingrix 10/30/2024         No Known Allergies       Current Outpatient Medications:     Breztri Aerosphere 160-9-4.8 MCG/ACT aerosol inhaler, Inhale 2 puffs 2 (Two) Times a Day., Disp: , Rfl:     cetirizine (zyrTEC) 10 MG tablet, Take 1 tablet by mouth., Disp: , Rfl:     Cholecalciferol 125 MCG (5000 UT) tablet, Take 1 tablet by mouth Daily., Disp: , Rfl:     Docusate Sodium 100 MG capsule, Take 1 tablet by mouth.,  "Disp: , Rfl:     estradiol (ESTRACE) 0.1 MG/GM vaginal cream, USE AS DIRECTED 3 TIMES A WEEK, Disp: , Rfl:     ezetimibe (ZETIA) 10 MG tablet, Take 1 tablet by mouth Daily., Disp: , Rfl:     fenofibrate (TRICOR) 145 MG tablet, , Disp: , Rfl:     loratadine (CLARITIN) 10 MG tablet, , Disp: , Rfl:     Magnesium 250 MG tablet, Take 1 tablet by mouth Daily., Disp: , Rfl:     montelukast (SINGULAIR) 10 MG tablet, Take 1 tablet by mouth Daily., Disp: , Rfl:     omeprazole (priLOSEC) 40 MG capsule, , Disp: , Rfl:     simvastatin (ZOCOR) 20 MG tablet, Take 1 tablet by mouth Daily., Disp: , Rfl:      Objective   Physical Exam  Constitutional:       General: She is not in acute distress.     Appearance: Normal appearance. She is normal weight.   HENT:      Right Ear: Hearing normal.      Left Ear: Hearing normal.      Nose: No nasal tenderness or congestion.      Mouth/Throat:      Mouth: Mucous membranes are moist. No oral lesions.   Eyes:      Extraocular Movements: Extraocular movements intact.      Pupils: Pupils are equal, round, and reactive to light.   Cardiovascular:      Rate and Rhythm: Normal rate and regular rhythm.      Pulses: Normal pulses.      Heart sounds: Normal heart sounds. No murmur heard.  Pulmonary:      Effort: Pulmonary effort is normal.      Breath sounds: Examination of the left-upper field reveals rhonchi. Rhonchi present. No wheezing or rales.   Musculoskeletal:      Right lower leg: No edema.      Left lower leg: No edema.   Skin:     General: Skin is warm and dry.      Findings: No lesion or rash.   Neurological:      General: No focal deficit present.      Mental Status: She is alert and oriented to person, place, and time.   Psychiatric:         Mood and Affect: Affect normal. Mood is not anxious or depressed.         Vital Signs:   /75 (BP Location: Right arm, Patient Position: Sitting, Cuff Size: Adult)   Pulse 61   Temp 97.6 °F (36.4 °C) (Oral)   Resp 16   Ht 172.7 cm (68\")   Wt " 80.7 kg (178 lb)   SpO2 95% Comment: RA  BMI 27.06 kg/m²        Result Review :   The following data was reviewed by: ROBE Perry on 02/06/2025:  CMP          8/6/2024    06:51 9/3/2024    07:08 11/27/2024    06:40   CMP   Glucose 88  85  78    BUN 22  19  20    Creatinine 0.99  0.94  1.08    EGFR 59.2  63.0  53.0    Sodium 136  137  137    Potassium 4.1  3.9  4.1    Chloride 100  102  100    Calcium 9.8  10.1  10.0    Total Protein  7.0  7.4    Albumin 4.1  4.1  4.2    Globulin  2.9  3.2    Total Bilirubin  0.4  0.5    Alkaline Phosphatase  43  48    AST (SGOT)  26  25    ALT (SGPT)  16  14    Albumin/Globulin Ratio  1.4  1.3    BUN/Creatinine Ratio 22.2  20.2  18.5    Anion Gap 9.0  7.7  10.0      CBC w/diff          8/6/2024    06:51 9/3/2024    07:08 11/27/2024    06:40   CBC w/Diff   WBC 7.23  6.75  7.16    RBC 4.71  4.70  4.69    Hemoglobin 14.1  14.3  14.3    Hematocrit 43.1  42.8  42.7    MCV 91.5  91.1  91.0    MCH 29.9  30.4  30.5    MCHC 32.7  33.4  33.5    RDW 12.8  12.7  12.7    Platelets 258  281  270    Neutrophil Rel % 63.7  62.9  65.6    Immature Granulocyte Rel % 0.4  0.3  0.1    Lymphocyte Rel % 22.3  24.1  21.1    Monocyte Rel % 7.9  7.7  10.1    Eosinophil Rel % 5.1  4.4  2.7    Basophil Rel % 0.6  0.6  0.4      Data reviewed : Radiologic studies chest CT 1/2/2025, chest CT 1/30/2025, Recent hospitalization notes Dr. Carson's bronchoscopy operative note 1/30/2025, and my last office note    Procedures        Assessment and Plan    Diagnoses and all orders for this visit:    1. Adenocarcinoma, lung, left (Primary)  -     Complete PFT - Pre & Post Bronchodilator; Future  -     Ambulatory Referral to Thoracic Surgery  -     NM PET/CT Skull Base to Mid Thigh; Future  -     MRI Brain With & Without Contrast; Future    2. Pneumonia due to Pseudomonas species, unspecified laterality, unspecified part of lung  Comments:  Patient needs midline and cefepime 2gm q12h x 10 days    3. Chronic  bronchitis, unspecified chronic bronchitis type  Comments:  continue Breztri    4. Chronic cough  Comments:  continue Breztri    5. Solitary pulmonary nodule  -     NM PET/CT Skull Base to Mid Thigh; Future        Assessment & Plan  1. Lung cancer.  The biopsy results confirmed the presence of lung cancer in the left lower lobe, measuring 1.6 cm. The coughing could be attributed to either the cancer or the bacterial infection. A PET scan will be ordered to assess the extent of the disease, including potential lymph node involvement. A brain MRI will also be conducted to check for metastasis. A pulmonary function test will be performed to evaluate lung function. Depending on the PET scan results, surgical removal of the cancerous area may be considered. Referrals to oncology and radiation oncology will be made for further evaluation and treatment planning. She has been informed that she can proceed with her travel plans as there are no restrictions related to her condition.    2. Pseudomonas infection.  The biopsy also revealed a significant growth of Pseudomonas bacteria. Intravenous antibiotics will be administered twice daily for 10 days to treat this infection. Home health services will be arranged to facilitate the IV antibiotic administration at home. The nurse will provide instructions on setting up the IV.    PROCEDURE  The patient underwent a bronchoscopy last week, which she tolerated well.      I spent 65 minutes caring for Lolis on this date of service. This time includes time spent by me in the following activities:preparing for the visit, reviewing tests, obtaining and/or reviewing a separately obtained history, performing a medically appropriate examination and/or evaluation , counseling and educating the patient/family/caregiver, ordering medications, tests, or procedures, referring and communicating with other health care professionals , documenting information in the medical record, and care  coordination    Follow Up   Return in about 3 months (around 5/6/2025) for Recheck with Polina.  Patient was given instructions and counseling regarding her condition or for health maintenance advice. Please see specific information pulled into the AVS if appropriate.

## 2025-02-06 ENCOUNTER — OFFICE VISIT (OUTPATIENT)
Dept: PULMONOLOGY | Facility: CLINIC | Age: 80
End: 2025-02-06
Payer: MEDICARE

## 2025-02-06 ENCOUNTER — HOSPITAL ENCOUNTER (OUTPATIENT)
Dept: MRI IMAGING | Facility: HOSPITAL | Age: 80
Discharge: HOME | End: 2025-02-06
Payer: MEDICARE

## 2025-02-06 ENCOUNTER — HOSPITAL ENCOUNTER (OUTPATIENT)
Dept: RESPIRATORY THERAPY | Facility: HOSPITAL | Age: 80
Discharge: HOME | End: 2025-02-06
Payer: MEDICARE

## 2025-02-06 VITALS
WEIGHT: 178 LBS | SYSTOLIC BLOOD PRESSURE: 165 MMHG | HEIGHT: 68 IN | DIASTOLIC BLOOD PRESSURE: 75 MMHG | HEART RATE: 61 BPM | TEMPERATURE: 97.6 F | OXYGEN SATURATION: 95 % | BODY MASS INDEX: 26.98 KG/M2 | RESPIRATION RATE: 16 BRPM

## 2025-02-06 DIAGNOSIS — J15.1 PNEUMONIA DUE TO PSEUDOMONAS SPECIES, UNSPECIFIED LATERALITY, UNSPECIFIED PART OF LUNG: ICD-10-CM

## 2025-02-06 DIAGNOSIS — C34.92 ADENOCARCINOMA, LUNG, LEFT: ICD-10-CM

## 2025-02-06 DIAGNOSIS — C34.92 ADENOCARCINOMA, LUNG, LEFT: Primary | ICD-10-CM

## 2025-02-06 DIAGNOSIS — R05.3 CHRONIC COUGH: Chronic | ICD-10-CM

## 2025-02-06 DIAGNOSIS — R91.1 SOLITARY PULMONARY NODULE: ICD-10-CM

## 2025-02-06 DIAGNOSIS — J42 CHRONIC BRONCHITIS, UNSPECIFIED CHRONIC BRONCHITIS TYPE: ICD-10-CM

## 2025-02-06 LAB
CREAT BLDA-MCNC: 1 MG/DL (ref 0.6–1.3)
EGFRCR SERPLBLD CKD-EPI 2021: 58.1 ML/MIN/1.73
FUNGUS WND CULT: NORMAL
MYCOBACTERIUM SPEC CULT: NORMAL
NIGHT BLUE STAIN TISS: NORMAL
NIGHT BLUE STAIN TISS: NORMAL

## 2025-02-06 PROCEDURE — 94726 PLETHYSMOGRAPHY LUNG VOLUMES: CPT

## 2025-02-06 PROCEDURE — 25510000002 GADOBENATE DIMEGLUMINE 529 MG/ML SOLUTION: Performed by: NURSE PRACTITIONER

## 2025-02-06 PROCEDURE — 70553 MRI BRAIN STEM W/O & W/DYE: CPT

## 2025-02-06 PROCEDURE — 82565 ASSAY OF CREATININE: CPT

## 2025-02-06 PROCEDURE — 94060 EVALUATION OF WHEEZING: CPT

## 2025-02-06 PROCEDURE — 94729 DIFFUSING CAPACITY: CPT

## 2025-02-06 PROCEDURE — A9577 INJ MULTIHANCE: HCPCS | Performed by: NURSE PRACTITIONER

## 2025-02-06 RX ORDER — ALBUTEROL SULFATE 0.83 MG/ML
SOLUTION RESPIRATORY (INHALATION)
Status: COMPLETED
Start: 2025-02-06 | End: 2025-02-06

## 2025-02-06 RX ORDER — ALBUTEROL SULFATE 0.83 MG/ML
2.5 SOLUTION RESPIRATORY (INHALATION) ONCE
Status: COMPLETED | OUTPATIENT
Start: 2025-02-06 | End: 2025-02-06

## 2025-02-06 RX ADMIN — ALBUTEROL SULFATE 2.5 MG: 2.5 SOLUTION RESPIRATORY (INHALATION) at 09:15

## 2025-02-06 RX ADMIN — ALBUTEROL SULFATE 2.5 MG: 2.5 SOLUTION RESPIRATORY (INHALATION) at 09:16

## 2025-02-06 RX ADMIN — GADOBENATE DIMEGLUMINE 15 ML: 529 INJECTION, SOLUTION INTRAVENOUS at 15:56

## 2025-02-07 DIAGNOSIS — J15.1 PNEUMONIA DUE TO PSEUDOMONAS SPECIES, UNSPECIFIED LATERALITY, UNSPECIFIED PART OF LUNG: Primary | ICD-10-CM

## 2025-02-07 RX ORDER — SODIUM CHLORIDE 9 MG/ML
20 INJECTION, SOLUTION INTRAVENOUS ONCE
Status: CANCELLED | OUTPATIENT
Start: 2025-02-10

## 2025-02-10 ENCOUNTER — HOSPITAL ENCOUNTER (OUTPATIENT)
Dept: INFUSION THERAPY | Facility: HOSPITAL | Age: 80
Discharge: HOME | End: 2025-02-10
Admitting: NURSE PRACTITIONER
Payer: MEDICARE

## 2025-02-10 VITALS
HEART RATE: 70 BPM | HEIGHT: 68 IN | WEIGHT: 182.32 LBS | DIASTOLIC BLOOD PRESSURE: 40 MMHG | RESPIRATION RATE: 20 BRPM | OXYGEN SATURATION: 96 % | SYSTOLIC BLOOD PRESSURE: 139 MMHG | TEMPERATURE: 98 F | BODY MASS INDEX: 27.63 KG/M2

## 2025-02-10 DIAGNOSIS — J15.1 PNEUMONIA DUE TO PSEUDOMONAS SPECIES, UNSPECIFIED LATERALITY, UNSPECIFIED PART OF LUNG: Primary | ICD-10-CM

## 2025-02-10 DIAGNOSIS — Z45.2 ENCOUNTER FOR ASSESSMENT OF PERIPHERALLY INSERTED CENTRAL CATHETER (PICC): Primary | ICD-10-CM

## 2025-02-10 PROCEDURE — 36410 VNPNXR 3YR/> PHY/QHP DX/THER: CPT

## 2025-02-10 PROCEDURE — C1751 CATH, INF, PER/CENT/MIDLINE: HCPCS

## 2025-02-10 PROCEDURE — 96365 THER/PROPH/DIAG IV INF INIT: CPT

## 2025-02-10 PROCEDURE — 25010000002 CEFEPIME PER 500 MG: Performed by: NURSE PRACTITIONER

## 2025-02-10 RX ORDER — SODIUM CHLORIDE 9 MG/ML
20 INJECTION, SOLUTION INTRAVENOUS ONCE
Status: DISCONTINUED | OUTPATIENT
Start: 2025-02-10 | End: 2025-02-12 | Stop reason: HOSPADM

## 2025-02-10 RX ORDER — SODIUM CHLORIDE 9 MG/ML
20 INJECTION, SOLUTION INTRAVENOUS ONCE
Status: CANCELLED | OUTPATIENT
Start: 2025-02-10

## 2025-02-10 RX ADMIN — CEFEPIME 2000 MG: 2 INJECTION, POWDER, FOR SOLUTION INTRAVENOUS at 11:41

## 2025-02-10 NOTE — ADDENDUM NOTE
Encounter addended by: Ankush Michelle RN on: 2/10/2025 12:36 PM   Actions taken: LDA properties accepted, Flowsheet accepted, Clinical Note Signed, Charge Capture section accepted

## 2025-02-10 NOTE — CONSULTS
Midline Line Insertion Procedure Note    Procedure: Insertion of Midline Catheter    Indications:  Home IV therapy    Procedure Details     Patient's pertinent medical history was reviewed.    Ultrasound used to identify an appropriate vein.Sterile technique was used including antiseptics, cap, gloves, hand hygiene, mask, and sheet.    #20G/10CM PowerGlide inserted to the R Basilic vein per hospital protocol.   Blood return:  yes    Findings:  Catheter inserted to 10 cm, with 0 cm. Exposed.   Catheter was flushed with 20 cc NS. Patient did tolerate procedure well.    LOT: [id number]  Expiration date:10/2025    Ankush Michelle RN

## 2025-02-12 ENCOUNTER — HOSPITAL ENCOUNTER (OUTPATIENT)
Dept: PET IMAGING | Facility: HOSPITAL | Age: 80
Discharge: HOME | End: 2025-02-12
Payer: MEDICARE

## 2025-02-12 ENCOUNTER — HOSPITAL ENCOUNTER (OUTPATIENT)
Dept: INFUSION THERAPY | Facility: HOSPITAL | Age: 80
Discharge: HOME | End: 2025-02-12
Admitting: NURSE PRACTITIONER
Payer: MEDICARE

## 2025-02-12 VITALS
OXYGEN SATURATION: 98 % | HEART RATE: 57 BPM | SYSTOLIC BLOOD PRESSURE: 157 MMHG | TEMPERATURE: 97.7 F | RESPIRATION RATE: 20 BRPM | DIASTOLIC BLOOD PRESSURE: 70 MMHG

## 2025-02-12 DIAGNOSIS — J15.1 PNEUMONIA DUE TO PSEUDOMONAS SPECIES, UNSPECIFIED LATERALITY, UNSPECIFIED PART OF LUNG: Primary | ICD-10-CM

## 2025-02-12 DIAGNOSIS — C34.92 ADENOCARCINOMA, LUNG, LEFT: ICD-10-CM

## 2025-02-12 DIAGNOSIS — Z45.89 ENCOUNTER FOR MANAGEMENT OF PERIPHERALLY INSERTED CENTRAL CATHETER (PICC): Primary | ICD-10-CM

## 2025-02-12 DIAGNOSIS — R91.1 SOLITARY PULMONARY NODULE: ICD-10-CM

## 2025-02-12 PROCEDURE — 36410 VNPNXR 3YR/> PHY/QHP DX/THER: CPT

## 2025-02-12 PROCEDURE — 34310000005 FLUDEOXYGLUCOSE F18 SOLUTION: Performed by: NURSE PRACTITIONER

## 2025-02-12 PROCEDURE — A9552 F18 FDG: HCPCS | Performed by: NURSE PRACTITIONER

## 2025-02-12 PROCEDURE — 78815 PET IMAGE W/CT SKULL-THIGH: CPT

## 2025-02-12 PROCEDURE — C1751 CATH, INF, PER/CENT/MIDLINE: HCPCS

## 2025-02-12 RX ADMIN — FLUDEOXYGLUCOSE F 18 1 DOSE: 200 INJECTION, SOLUTION INTRAVENOUS at 13:50

## 2025-02-12 NOTE — CONSULTS
Midline Line Insertion Procedure Note    Procedure: Insertion of #3F PowerMidline    Indications:  Home IV therapy      Procedure Details   Patient's medical history was reviewed.  Ultrasound used to identify an appropriate vein.  Sterile technique was used including antiseptics, cap, gloves, gown, hand hygiene, mask, and sheet.    #3F PowerMidline inserted to the L Basilic vein per hospital protocol.   Blood return:  yes    Findings:  Catheter inserted to 15 cm, with 0 cm. Exposed. Catheter was flushed with 20 cc NS. Patient did tolerate procedure well.    LOT: [id number]  Expiration date: 2025-11-30    Recommendations:  Midline Brochure given to patient with teaching instruction.

## 2025-02-12 NOTE — ADDENDUM NOTE
Encounter addended by: Keiry Lacey RN on: 2/12/2025 4:23 PM   Actions taken: LDA properties accepted, Flowsheet accepted, Clinical Note Signed, Charge Capture section accepted

## 2025-02-13 LAB
CYTO UR: NORMAL
FUNGUS WND CULT: NORMAL
LAB AP CASE REPORT: NORMAL
LAB AP CLINICAL INFORMATION: NORMAL
LAB AP DIAGNOSIS COMMENT: NORMAL
LAB AP SPECIAL STAINS: NORMAL
Lab: NORMAL
Lab: NORMAL
MYCOBACTERIUM SPEC CULT: NORMAL
NIGHT BLUE STAIN TISS: NORMAL
NIGHT BLUE STAIN TISS: NORMAL
PATH REPORT.ADDENDUM SPEC: NORMAL
PATH REPORT.FINAL DX SPEC: NORMAL
PATH REPORT.GROSS SPEC: NORMAL

## 2025-02-18 ENCOUNTER — OFFICE VISIT (OUTPATIENT)
Dept: OTHER | Facility: HOSPITAL | Age: 80
End: 2025-02-18
Payer: MEDICARE

## 2025-02-18 ENCOUNTER — PREP FOR SURGERY (OUTPATIENT)
Dept: OTHER | Facility: HOSPITAL | Age: 80
End: 2025-02-18
Payer: MEDICARE

## 2025-02-18 VITALS
RESPIRATION RATE: 16 BRPM | BODY MASS INDEX: 27.72 KG/M2 | SYSTOLIC BLOOD PRESSURE: 176 MMHG | DIASTOLIC BLOOD PRESSURE: 77 MMHG | WEIGHT: 182.3 LBS | OXYGEN SATURATION: 97 % | HEART RATE: 64 BPM

## 2025-02-18 DIAGNOSIS — R06.02 SHORTNESS OF BREATH: ICD-10-CM

## 2025-02-18 DIAGNOSIS — C34.92 ADENOCARCINOMA, LUNG, LEFT: Primary | ICD-10-CM

## 2025-02-18 DIAGNOSIS — C34.30 NSCLC OF LOWER LOBE: Primary | ICD-10-CM

## 2025-02-18 PROBLEM — E83.52 HYPERCALCEMIA: Status: ACTIVE | Noted: 2025-02-18

## 2025-02-18 PROBLEM — I10 ESSENTIAL (PRIMARY) HYPERTENSION: Status: ACTIVE | Noted: 2025-02-10

## 2025-02-18 PROBLEM — E78.5 DYSLIPIDEMIA: Status: ACTIVE | Noted: 2022-08-18

## 2025-02-18 PROBLEM — N18.31 STAGE 3A CHRONIC KIDNEY DISEASE: Status: ACTIVE | Noted: 2022-08-18

## 2025-02-18 PROCEDURE — 3078F DIAST BP <80 MM HG: CPT | Performed by: THORACIC SURGERY (CARDIOTHORACIC VASCULAR SURGERY)

## 2025-02-18 PROCEDURE — 1160F RVW MEDS BY RX/DR IN RCRD: CPT | Performed by: THORACIC SURGERY (CARDIOTHORACIC VASCULAR SURGERY)

## 2025-02-18 PROCEDURE — 3077F SYST BP >= 140 MM HG: CPT | Performed by: THORACIC SURGERY (CARDIOTHORACIC VASCULAR SURGERY)

## 2025-02-18 PROCEDURE — 1159F MED LIST DOCD IN RCRD: CPT | Performed by: THORACIC SURGERY (CARDIOTHORACIC VASCULAR SURGERY)

## 2025-02-18 RX ORDER — GABAPENTIN 300 MG/1
300 CAPSULE ORAL ONCE
OUTPATIENT
Start: 2025-02-18 | End: 2025-02-18

## 2025-02-18 RX ORDER — TORSEMIDE 10 MG/1
10 TABLET ORAL DAILY
COMMUNITY
End: 2025-02-18

## 2025-02-18 RX ORDER — SODIUM CHLORIDE 0.9 % (FLUSH) 0.9 %
3 SYRINGE (ML) INJECTION EVERY 12 HOURS SCHEDULED
OUTPATIENT
Start: 2025-02-18

## 2025-02-18 RX ORDER — ACETAMINOPHEN 500 MG
1000 TABLET ORAL ONCE
OUTPATIENT
Start: 2025-02-18 | End: 2025-02-18

## 2025-02-18 RX ORDER — SODIUM CHLORIDE 9 MG/ML
40 INJECTION, SOLUTION INTRAVENOUS AS NEEDED
OUTPATIENT
Start: 2025-02-18

## 2025-02-18 RX ORDER — LISINOPRIL 5 MG/1
5 TABLET ORAL DAILY
Qty: 30 TABLET | Refills: 3 | Status: SHIPPED | OUTPATIENT
Start: 2025-02-18

## 2025-02-18 RX ORDER — SODIUM CHLORIDE 0.9 % (FLUSH) 0.9 %
3-10 SYRINGE (ML) INJECTION AS NEEDED
OUTPATIENT
Start: 2025-02-18

## 2025-02-18 RX ORDER — HEPARIN SODIUM 5000 [USP'U]/ML
5000 INJECTION, SOLUTION INTRAVENOUS; SUBCUTANEOUS ONCE
OUTPATIENT
Start: 2025-02-18 | End: 2025-02-18

## 2025-02-18 RX ORDER — CELECOXIB 200 MG/1
200 CAPSULE ORAL ONCE
OUTPATIENT
Start: 2025-02-18 | End: 2025-02-18

## 2025-02-18 NOTE — LETTER
"March 8, 2025     Mary Cruz, APRN  [address] [place] [address]    Patient: Lolis Griffith   YOB: 1945   Date of Visit: 2/18/2025     Dear Mary Cruz, APRHAYDEN:       Thank you for referring Lolis Griffith to me for evaluation. Below are the relevant portions of my assessment and plan of care.    If you have questions, please do not hesitate to call me. I look forward to following Lolis along with you.         Sincerely,        Teodora Dozier MD        CC: No Recipients    Teodora Dozier MD  03/08/25 1548  Sign when Signing Visit  Chief Complaint  Lung Cancer    Subjective       Lolis Griffith presents to [facility]  History of Present Illness  Ms. Griffith is a pleasant 79-year-old lady with a recently diagnosed adenocarcinoma.  This nodule was discovered on monitoring for a benign pancreas lesion.  She has undergone bronchoscopy as well as brain MRI and PET CT scan.  She is a never smoker.  She does have some shortness of breath.  Objective  Vital Signs:  /77 (BP Location: Right arm, Patient Position: Sitting, Cuff Size: Adult)   Pulse 64   Resp 16   Wt 82.7 kg (182 lb 4.8 oz)   SpO2 97%   BMI 27.72 kg/m²   Estimated body mass index is 27.72 kg/m² as calculated from the following:    Height as of 2/10/25: 172.7 cm (68\").    Weight as of this encounter: 82.7 kg (182 lb 4.8 oz).          Physical Exam  Vitals and nursing note reviewed.   Constitutional:       Appearance: She is well-developed.   HENT:      Head: Normocephalic and atraumatic.      Nose: Nose normal.   Eyes:      Conjunctiva/sclera: Conjunctivae normal.   Cardiovascular:      Rate and Rhythm: Normal rate.   Pulmonary:      Effort: Pulmonary effort is normal.   Abdominal:      Palpations: Abdomen is soft.   Musculoskeletal:      Cervical back: Neck supple.   Skin:     General: Skin is warm and dry.   Neurological:      Mental Status: She is alert and " oriented to person, place, and time.   Psychiatric:         Behavior: Behavior normal.         Thought Content: Thought content normal.         Judgment: Judgment normal.        Result Review:          I have fully reviewed the CT of the chest performed on 1/30/2025 and agree with radiology report which demonstrates a 1.6 cm left lower lobe nodule concerning for bronchogenic malignancy.  Infectious appearing tree-in-bud nodularity in the left lower lobe.  Mediastinal lymphadenopathy.  4.1 cm renal lesion.    I have independently reviewed the PET CT scan performed on 2/12/2025 and agree with radiology report which demonstrates hypermetabolic 1.6 cm left lower lobe nodule with hypermetabolic mediastinal and hilar lymphadenopathy without evidence of distant metastatic disease.    Brain MRI without evidence of metastatic disease.    Pulmonary function studies demonstrate an FEV1 of 2.49 or 109% predicted and a DLCO of 22 or 100%    Pathology: Left lower lobe nodule adenocarcinoma,     Assessment and Plan   Diagnoses and all orders for this visit:    1. NSCLC of lower lobe (Primary)  -     Stress Test With Myocardial Perfusion - One Day; Future  -     Ambulatory Referral to Oncology    2. Shortness of breath  -     Stress Test With Myocardial Perfusion - One Day; Future    Other orders  -     lisinopril (PRINIVIL,ZESTRIL) 5 MG tablet; Take 1 tablet by mouth Daily.  Dispense: 30 tablet; Refill: 3    Ms. 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  She does have some mediastinal and hilar lymphadenopathy that could be benign given the Pseudomonas seen on her lung biopsy versus malignancy.  She is going to be seen and evaluated by Dr. Banks.  She would be a candidate for curative intent resection based on her pulmonary function studies.  We will plan a stress echo.  Will also plan referral to oncology to discuss neoadjuvant therapy.    She is on a diuretic for blood  pressure control and this is not a first-line treatment for blood pressure.  I would recommend starting lisinopril which was prescribed today.  She will plan to stop her diuretic.         Follow Up   No follow-ups on file.  Patient was given instructions and counseling regarding her condition or for health maintenance advice. Please see specific information pulled into the AVS if appropriate.

## 2025-02-20 LAB
FUNGUS WND CULT: NORMAL
MYCOBACTERIUM SPEC CULT: NORMAL
NIGHT BLUE STAIN TISS: NORMAL
NIGHT BLUE STAIN TISS: NORMAL

## 2025-02-24 ENCOUNTER — CONSULT (OUTPATIENT)
Dept: ONCOLOGY | Facility: HOSPITAL | Age: 80
End: 2025-02-24
Payer: MEDICARE

## 2025-02-24 ENCOUNTER — RESEARCH ENCOUNTER (OUTPATIENT)
Dept: OTHER | Facility: OTHER | Age: 80
End: 2025-02-24
Payer: MEDICARE

## 2025-02-24 VITALS
RESPIRATION RATE: 18 BRPM | DIASTOLIC BLOOD PRESSURE: 62 MMHG | HEIGHT: 68 IN | OXYGEN SATURATION: 97 % | WEIGHT: 186.73 LBS | SYSTOLIC BLOOD PRESSURE: 153 MMHG | HEART RATE: 65 BPM | TEMPERATURE: 98 F | BODY MASS INDEX: 28.3 KG/M2

## 2025-02-24 DIAGNOSIS — C34.32 MALIGNANT NEOPLASM OF LOWER LOBE OF LEFT LUNG: Primary | ICD-10-CM

## 2025-02-24 PROBLEM — C34.92 ADENOCARCINOMA, LUNG, LEFT: Status: RESOLVED | Noted: 2025-02-18 | Resolved: 2025-02-24

## 2025-02-24 PROBLEM — C34.92 ADENOCARCINOMA, LUNG, LEFT: Status: ACTIVE | Noted: 2025-02-18

## 2025-02-24 PROBLEM — C34.92 ADENOCARCINOMA OF LUNG, LEFT: Status: RESOLVED | Noted: 2025-02-18 | Resolved: 2025-02-24

## 2025-02-24 PROCEDURE — 3078F DIAST BP <80 MM HG: CPT | Performed by: INTERNAL MEDICINE

## 2025-02-24 PROCEDURE — G2211 COMPLEX E/M VISIT ADD ON: HCPCS | Performed by: INTERNAL MEDICINE

## 2025-02-24 PROCEDURE — G0463 HOSPITAL OUTPT CLINIC VISIT: HCPCS | Performed by: INTERNAL MEDICINE

## 2025-02-24 PROCEDURE — 99205 OFFICE O/P NEW HI 60 MIN: CPT | Performed by: INTERNAL MEDICINE

## 2025-02-24 PROCEDURE — 1126F AMNT PAIN NOTED NONE PRSNT: CPT | Performed by: INTERNAL MEDICINE

## 2025-02-24 PROCEDURE — 3077F SYST BP >= 140 MM HG: CPT | Performed by: INTERNAL MEDICINE

## 2025-02-24 NOTE — PROGRESS NOTES
Chief Complaint  NEW PT - ONCOLOGY    Teodora Dozier MD Williams, ROBE Clancy presents to [facility] for lung adenocarcinoma    Patient is a very pleasant 79-year-old female with past medical history of hypertension, dyslipidemia, CKD who presents for lung adenocarcinoma.  Patient has CT chest done for lung nodule on 1/30/2025.  Shows stable irregular 1.6 cm left lower lobe nodule.  This showed stable mild mediastinal adenopathy which could be reactive or metastatic.  Ion bronchoscopy done same day showed left lower lobe biopsy positive for adenocarcinoma lipidic pattern.  EGFR, Ross, ALK all negative..  L1 positive at 60%.  BAL culture positive for Pseudomonas.  She was started on IV cefepime for 10 days via PICC.  She since completed this.  MRI brain on 6 February was negative for intracranial disease.  PET scan from 2/12/2025 showed a 1.6 cm left lower lobe nodule that was FDG avid.  FDG avid mediastinal and left hilar lymphadenopathy also shown.  Index subcarinal lymph nodes measure 2.5 cm with SUV max 6.6.  Also had a left infrahilar lymph node positive.  Patient at baseline health. We did discuss chemoimmunotherapy as neoadjuvant treatment.  Will discuss further with Dr. Dozier.     Oncology/Hematology History Overview Note   1/30/25: CT Chest done for lung nodule: Stable irregular 1.6 cm left lower lobe nodule, potentially neoplastic. Per Fleischner guidelines this could be assessed by short-term follow-up 3-month chest CT, PET/CT and/or biopsy. Recommend pulmonary consultation for management. Infectious/inflammatory lower lobe predominant tree-in-bud nodularity slightly waxing and waning since previous comparison. Findings could reflect bronchiolitis, microaspiration or atypical infection. Recommend attention on follow-up imaging.   Stable mild mediastinal adenopathy which could be reactive or metastatic pending etiology  of the left lower lobe nodule. Exophytic indeterminate right upper pole renal lesion measuring 4.1 cm. At this size nonemergent renal ultrasound is recommended to assess if lesion is solid or cystic. Aortic and severe coronary atherosclerotic disease.    1/30/25: ION bronchoscopy with biopsy: BAL culture positive for >100K CFU/mL of Pseudomonas. Left lower lobe biopsy positive for adenocarcinoma, lepidic pattern, lung primary. EGFR, ROS, ALK negative. PDL1 positive at 60%.     Started on IV antibiotic (Cefepime) x 10 days for lung pseudomonas per pulmonary team.     2/6/25: MRI brain negative for intracranial disease    2/12/25: NM PET: A 1.6 cm left lower lobe nodule is FDG avid with SUV max 7.4. There is mild bibasilar atelectasis. There is FDG-avid mediastinal and left hilar lymphadenopathy. An index subcarinal lymph node on image 85 measures 2.5 x 1.3 cm with SUV max 6.6. An index left infrahilar lymph node on image 94 has SUV max 9.4, with note that size measurement is difficult on this unenhanced exam. No metastatic disease outside of chest seen.      Malignant neoplasm of lower lobe of left lung   2/24/2025 Initial Diagnosis    Malignant neoplasm of lower lobe of left lung     2/24/2025 Cancer Staged    Staging form: Lung, AJCC V9  - Clinical: Stage IIB (cT1b, cN2a, cM0) - Signed by Reinaldo Banks MD on 2/24/2025           Review of Systems   All other systems reviewed and are negative.    Current Outpatient Medications on File Prior to Visit   Medication Sig Dispense Refill    Brejewelltri Aerosphere 160-9-4.8 MCG/ACT aerosol inhaler Inhale 2 puffs 2 (Two) Times a Day.      cetirizine (zyrTEC) 10 MG tablet Take 1 tablet by mouth.      Cholecalciferol 125 MCG (5000 UT) tablet Take 1 tablet by mouth Daily.      Docusate Sodium 100 MG capsule Take 1 tablet by mouth.      estradiol (ESTRACE) 0.1 MG/GM vaginal cream USE AS DIRECTED 3 TIMES A WEEK      ezetimibe (ZETIA) 10 MG tablet Take 1 tablet by mouth  Daily.      fenofibrate (TRICOR) 145 MG tablet       lisinopril (PRINIVIL,ZESTRIL) 5 MG tablet Take 1 tablet by mouth Daily. 30 tablet 3    loratadine (CLARITIN) 10 MG tablet       Magnesium 250 MG tablet Take 1 tablet by mouth Daily.      montelukast (SINGULAIR) 10 MG tablet Take 1 tablet by mouth Daily.      omeprazole (priLOSEC) 40 MG capsule       simvastatin (ZOCOR) 20 MG tablet Take 1 tablet by mouth Daily.       No current facility-administered medications on file prior to visit.       No Known Allergies  Past Medical History:   Diagnosis Date    History of sinus problem     Hypertension     Other hyperlipidemia      Past Surgical History:   Procedure Laterality Date    BLADDER REPAIR      BRONCHOSCOPY WITH ION ROBOTIC ASSIST Left 01/30/2025    Procedure: BRONCHOSCOPY WITH ION ROBOT: BAL, REBUS, NEEDLE ASPIRATE, BIOPSIES insertion of lighted robot navigated instrument to view inside the lung;  Surgeon: Stevie Carson DO;  Location: [facility];  Service: Robotics - Pulmonary;  Laterality: Left;    LAPAROSCOPIC CHOLECYSTECTOMY      RECTAL PROLAPSE REPAIR  2021    THYROID SURGERY  2018    TOTAL ABDOMINAL HYSTERECTOMY WITH SALPINGO OOPHORECTOMY      TOTAL HIP ARTHROPLASTY Left      Social History     Socioeconomic History    Marital status:    Tobacco Use    Smoking status: Never     Passive exposure: Never    Smokeless tobacco: Never   Vaping Use    Vaping status: Never Used   Substance and Sexual Activity    Alcohol use: Not Currently    Drug use: Never    Sexual activity: Defer     Birth control/protection: Surgical     Family History   Problem Relation Age of Onset    Cancer Brother        Objective   Physical Exam  Well appearing patient in no acute distress on RA  Anicteric sclerae, no rash on exposed skin  Respirations non-labored  Awake, alert, and oriented x 4. Speech intact. No gross neurologic deficit  Appropriate mood and affect    Vitals:    02/24/25 1257   BP: 153/62   Pulse:  "65   Resp: 18   Temp: 98 °F (36.7 °C)   TempSrc: Temporal   SpO2: 97%   Weight: 84.7 kg (186 lb 11.7 oz)   Height: 172.7 cm (67.99\")   PainSc: 0-No pain               PHQ-9 Total Score:                      Result Review :   The following data was reviewed by: Reinaldo Banks MD on 02/24/25:  Lab Results   Component Value Date    HGB 14.3 11/27/2024    HCT 42.7 11/27/2024    MCV 91.0 11/27/2024     11/27/2024    WBC 7.16 11/27/2024    NEUTROABS 4.70 11/27/2024    LYMPHSABS 1.51 11/27/2024    MONOSABS 0.72 11/27/2024    EOSABS 0.19 11/27/2024    BASOSABS 0.03 11/27/2024     Lab Results   Component Value Date    GLUCOSE 78 11/27/2024    BUN 20 11/27/2024    CREATININE 1.00 02/06/2025     11/27/2024    K 4.1 11/27/2024     11/27/2024    CO2 27.0 11/27/2024    CALCIUM 10.0 11/27/2024    PROTEINTOT 7.4 11/27/2024    ALBUMIN 4.2 11/27/2024    BILITOT 0.5 11/27/2024    ALKPHOS 48 11/27/2024    AST 25 11/27/2024    ALT 14 11/27/2024     Lab Results   Component Value Date    PHOS 3.1 08/06/2024    FREET4 1.21 09/03/2024    TSH 1.920 11/27/2024     Labs personally reviewed    Pathology report personally reviewed    PET personally reviewed and per my independent read with LLL hypermetabolic lesion with metastatic subcarcinal and left hilar adenopathy.          NM PET/CT Skull Base to Mid Thigh    Result Date: 2/12/2025  Impression: 1.1.6 cm left lower lobe nodule is FDG avid, compatible with primary lung malignancy. 2.FDG-avid mediastinal and left hilar lymphadenopathy, likely metastatic. 3.No evidence of metastasis outside of the chest. Electronically Signed: Shad Mckenna MD  2/12/2025 3:39 PM EST  Workstation ID: [id number]    MRI Brain With & Without Contrast    Result Date: 2/6/2025  Impression: 1.No acute intracranial process identified. 2.Findings suggestive of minimal chronic small vessel ischemic disease. 3.No evidence of intracranial metastasis. 4.Moderate paranasal sinus mucosal disease. " Electronically Signed: Shad Mckenna MD  2/6/2025 4:21 PM EST  Workstation ID: [id number]    CT Chest Without Contrast Diagnostic    Result Date: 1/30/2025  Impression: 1. Stable irregular 1.6 cm left lower lobe nodule, potentially neoplastic. Per Fleischner guidelines this could be assessed by short-term follow-up 3-month chest CT, PET/CT and/or biopsy. Recommend pulmonary consultation for management. 2. Infectious/inflammatory lower lobe predominant tree-in-bud nodularity slightly waxing and waning since previous comparison. Findings could reflect bronchiolitis, microaspiration or atypical infection. Recommend attention on follow-up imaging. 3. Stable mild mediastinal adenopathy which could be reactive or metastatic pending etiology of the left lower lobe nodule. 4. Exophytic indeterminate right upper pole renal lesion measuring 4.1 cm. At this size nonemergent renal ultrasound is recommended to assess if lesion is solid or cystic. 5. Aortic and severe coronary atherosclerotic disease. Electronically Signed: Umesh Shaffer MD  1/30/2025 10:30 AM EST  Workstation ID: [id number]         Assessment and Plan    Diagnoses and all orders for this visit:    1. Malignant neoplasm of lower lobe of left lung (Primary)  -     Ambulatory Referral to ONC Social Work  -     Ambulatory Referral to General Surgery      Left lung adenocarcinoma  Clinical T1cN2a based off PET, stage IIB.  Lymph node biopsies have not been performed.  Patient did have Pseudomonas infection but these lymph nodes appear to be metastatic.  Prefer to treat patient with neoadjuvant chemoimmunotherapy followed by surgery.  Have personally discussed patient with thoracic surgeon Dr. Dozier, who is in agreement.  Plan will be carboplatin, pemetrexed combined with pembrolizumab given every 3 weeks for 4 cycles  Risk, benefit, side effect profile of these 3 medications were discussed in detail including immune related side effects.  As needed and scheduled  antiemetics to be provided.  Dexamethasone premed to be provided.  B12 and folic acid while pemetrexed also will be provided. Plan to see patient back with cycle 1 neoadjuvant chemo as planned.      This is an acute or chronic illness that poses a threat to life or bodily function. The above treatment plan involves a high risk of complications and/or mortality of patient management.    The patient was seen and examined. Work by the provider also included review and/or ordering of lab tests, review and/or ordering of radiology tests, review and/or ordering of medicine tests, discussion with other physicians or providers, independent review of data, obtaining old records, review/summation of old records, and/or other review.     I have reviewed the family history, social history, and past medical history for this patient. Previous information and data has been reviewed and updated as needed. I have reviewed and verified the chief complaint, history, and other documentation. The patient was interviewed and examined at the bedside and the chart reviewed. The previous observations, recommendations, and conclusions were reviewed including those of other providers.     The plan was discussed with the patient and/or family. The patient was given time to ask questions and these questions were answered. At the conclusion of their visit they had no additional questions or concerns and all questions were answered to their satisfaction.    I spent 60 minutes caring for Lolis on this date of service. This time includes time spent by me in the following activities:preparing for the visit, reviewing tests, obtaining and/or reviewing a separately obtained history, performing a medically appropriate examination and/or evaluation , counseling and educating the patient/family/caregiver, ordering medications, tests, or procedures, referring and communicating with other health care professionals , documenting information in the medical  record, independently interpreting results and communicating that information with the patient/family/caregiver, and care coordination    Patient Follow Up: C1 chemo  Patient was given instructions and counseling regarding her condition or for health maintenance advice. Please see specific information pulled into the AVS if appropriate.

## 2025-02-24 NOTE — RESEARCH
After discussion with Dr. Banks,  met with patient to review the [facility] clinical trial Informed Consent Form. The consent form was reviewed and summarized by the coordinator. Patient declines participation in clinical trial at this time.  gave patient business card and a copy of the ICF should she have any additional research questions.     ANDERSON Coronel

## 2025-02-25 ENCOUNTER — DOCUMENTATION (OUTPATIENT)
Dept: PHARMACY | Facility: HOSPITAL | Age: 80
End: 2025-02-25
Payer: MEDICARE

## 2025-02-25 NOTE — PROGRESS NOTES
"PHARMACY C1D1 PRE VISIT ASSESSMENT    Patient will present for C1D1 of pembrolizumab 200 mg + pemetrexed 500 mg/m2 + carboplatin AUC 5 Q21D x 4 cycles     79 y.o. female  Estimated body surface area is 1.98 meters squared as calculated from the following:    Height as of 2/24/25: 172.7 cm (67.99\").    Weight as of 2/24/25: 84.7 kg (186 lb 11.7 oz).    Past Medical History:   Diagnosis Date    History of sinus problem     Hypertension     Other hyperlipidemia        Oncology/Hematology History Overview Note   1/30/25: CT Chest done for lung nodule: Stable irregular 1.6 cm left lower lobe nodule, potentially neoplastic. Per Fleischner guidelines this could be assessed by short-term follow-up 3-month chest CT, PET/CT and/or biopsy. Recommend pulmonary consultation for management. Infectious/inflammatory lower lobe predominant tree-in-bud nodularity slightly waxing and waning since previous comparison. Findings could reflect bronchiolitis, microaspiration or atypical infection. Recommend attention on follow-up imaging.   Stable mild mediastinal adenopathy which could be reactive or metastatic pending etiology of the left lower lobe nodule. Exophytic indeterminate right upper pole renal lesion measuring 4.1 cm. At this size nonemergent renal ultrasound is recommended to assess if lesion is solid or cystic. Aortic and severe coronary atherosclerotic disease.    1/30/25: ION bronchoscopy with biopsy: BAL culture positive for >100K CFU/mL of Pseudomonas. Left lower lobe biopsy positive for adenocarcinoma, lepidic pattern, lung primary. EGFR, ROS, ALK negative. PDL1 positive at 60%.     Started on IV antibiotic (Cefepime) x 10 days for lung pseudomonas per pulmonary team.     2/6/25: MRI brain negative for intracranial disease    2/12/25: NM PET: A 1.6 cm left lower lobe nodule is FDG avid with SUV max 7.4. There is mild bibasilar atelectasis. There is FDG-avid mediastinal and left hilar lymphadenopathy. An index subcarinal " lymph node on image 85 measures 2.5 x 1.3 cm with SUV max 6.6. An index left infrahilar lymph node on image 94 has SUV max 9.4, with note that size measurement is difficult on this unenhanced exam. No metastatic disease outside of chest seen.      Malignant neoplasm of lower lobe of left lung   2/24/2025 Initial Diagnosis    Malignant neoplasm of lower lobe of left lung     2/24/2025 Cancer Staged    Staging form: Lung, AJCC V9  - Clinical: Stage IIB (cT1b, cN2a, cM0) - Signed by Reinaldo Banks MD on 2/24/2025 2/24/2025 -  Chemotherapy    OP LUNG Pembrolizumab 200 mg / Pemetrexed / Carboplatin AUC=5         Relevant Pathology:   Final Diagnosis   Lung, left lower lobe, core biopsy:               - Adenocarcinoma, lepidic pattern, lung primary      The above positive (malignant) diagnosis was called to  Dr. MC Carson at  17:05 EST on  2/3/2025 by [facility].    Electronically signed by Luz Marina Fraser DO on 2/3/2025 at 1706     Final Diagnosis   1. Lung, left lower lobe, FNA:               - Suspicious for malignancy               - Suspicious for adenocarcinoma        2. Lung, left lower lobe, BAL:               - Atypical               - Atypical cells      Electronically signed by Luz Marina Fraser DO on 2/3/2025 at 1703           Potentially Actionable Mutation Present: YES PD-L1 60%      Relevant Imaging:    NM PET/CT Skull Base to Mid Thigh    Result Date: 2/12/2025  Impression: 1.1.6 cm left lower lobe nodule is FDG avid, compatible with primary lung malignancy. 2.FDG-avid mediastinal and left hilar lymphadenopathy, likely metastatic. 3.No evidence of metastasis outside of the chest. Electronically Signed: Shad Mckenna MD  2/12/2025 3:39 PM EST  Workstation ID: [id number]    MRI Brain With & Without Contrast    Result Date: 2/6/2025  Impression: 1.No acute intracranial process identified. 2.Findings suggestive of minimal chronic small vessel ischemic disease. 3.No evidence of intracranial  metastasis. 4.Moderate paranasal sinus mucosal disease. Electronically Signed: Shad Mckenna MD  2/6/2025 4:21 PM EST  Workstation ID: [id number]    CT Chest Without Contrast Diagnostic    Result Date: 1/30/2025  Impression: 1. Stable irregular 1.6 cm left lower lobe nodule, potentially neoplastic. Per Fleischner guidelines this could be assessed by short-term follow-up 3-month chest CT, PET/CT and/or biopsy. Recommend pulmonary consultation for management. 2. Infectious/inflammatory lower lobe predominant tree-in-bud nodularity slightly waxing and waning since previous comparison. Findings could reflect bronchiolitis, microaspiration or atypical infection. Recommend attention on follow-up imaging. 3. Stable mild mediastinal adenopathy which could be reactive or metastatic pending etiology of the left lower lobe nodule. 4. Exophytic indeterminate right upper pole renal lesion measuring 4.1 cm. At this size nonemergent renal ultrasound is recommended to assess if lesion is solid or cystic. 5. Aortic and severe coronary atherosclerotic disease. Electronically Signed: Umesh Shaffer MD  1/30/2025 10:30 AM EST  Workstation ID: [id number]      Current treatment regimen has insurance authorization approval? NO Pending     Medication treatment plan entered is appropriate per NCCN Guidelines    Pharmacy Follow-up Considerations: Patient with left lung adenocarcinoma. Plan to initiate pembrolizumab, pemetrexed, and carboplatin. Pharmacy will continue to monitor labs throughout treatment and will  patient prior to infusion on C1D1.   Adalid Bar, Landry   02/25/25

## 2025-02-27 ENCOUNTER — TELEPHONE (OUTPATIENT)
Dept: ONCOLOGY | Facility: HOSPITAL | Age: 80
End: 2025-02-27

## 2025-02-27 ENCOUNTER — LAB (OUTPATIENT)
Dept: LAB | Facility: HOSPITAL | Age: 80
End: 2025-02-27
Payer: MEDICARE

## 2025-02-27 ENCOUNTER — TRANSCRIBE ORDERS (OUTPATIENT)
Dept: LAB | Facility: HOSPITAL | Age: 80
End: 2025-02-27
Payer: MEDICARE

## 2025-02-27 DIAGNOSIS — R73.01 ELEVATED FASTING GLUCOSE: ICD-10-CM

## 2025-02-27 DIAGNOSIS — E78.5 HYPERLIPIDEMIA, UNSPECIFIED HYPERLIPIDEMIA TYPE: ICD-10-CM

## 2025-02-27 DIAGNOSIS — K21.9 GASTRIC REFLUX: ICD-10-CM

## 2025-02-27 DIAGNOSIS — E11.22 TYPE 2 DIABETES MELLITUS WITH DIABETIC CHRONIC KIDNEY DISEASE, UNSPECIFIED CKD STAGE, UNSPECIFIED WHETHER LONG TERM INSULIN USE: ICD-10-CM

## 2025-02-27 DIAGNOSIS — C34.92 ADENOCARCINOMA, LUNG, LEFT: ICD-10-CM

## 2025-02-27 DIAGNOSIS — D50.9 IRON DEFICIENCY ANEMIA, UNSPECIFIED IRON DEFICIENCY ANEMIA TYPE: ICD-10-CM

## 2025-02-27 DIAGNOSIS — I10 HYPERTENSION, ESSENTIAL: ICD-10-CM

## 2025-02-27 DIAGNOSIS — J30.89 OTHER ALLERGIC RHINITIS: ICD-10-CM

## 2025-02-27 DIAGNOSIS — D50.9 IRON DEFICIENCY ANEMIA, UNSPECIFIED IRON DEFICIENCY ANEMIA TYPE: Primary | ICD-10-CM

## 2025-02-27 LAB
25(OH)D3 SERPL-MCNC: 64.1 NG/ML (ref 30–100)
ALBUMIN SERPL-MCNC: 4.1 G/DL (ref 3.5–5.2)
ALBUMIN/GLOB SERPL: 1.6 G/DL
ALP SERPL-CCNC: 45 U/L (ref 39–117)
ALT SERPL W P-5'-P-CCNC: 20 U/L (ref 1–33)
ANION GAP SERPL CALCULATED.3IONS-SCNC: 9 MMOL/L (ref 5–15)
AST SERPL-CCNC: 31 U/L (ref 1–32)
BASOPHILS # BLD AUTO: 0.05 10*3/MM3 (ref 0–0.2)
BASOPHILS NFR BLD AUTO: 1 % (ref 0–1.5)
BILIRUB SERPL-MCNC: 0.5 MG/DL (ref 0–1.2)
BUN SERPL-MCNC: 18 MG/DL (ref 8–23)
BUN/CREAT SERPL: 21.2 (ref 7–25)
CALCIUM SPEC-SCNC: 9.8 MG/DL (ref 8.6–10.5)
CHLORIDE SERPL-SCNC: 99 MMOL/L (ref 98–107)
CHOLEST SERPL-MCNC: 170 MG/DL (ref 0–200)
CO2 SERPL-SCNC: 28 MMOL/L (ref 22–29)
CREAT SERPL-MCNC: 0.85 MG/DL (ref 0.57–1)
DEPRECATED RDW RBC AUTO: 44.7 FL (ref 37–54)
EGFRCR SERPLBLD CKD-EPI 2021: 70.7 ML/MIN/1.73
EOSINOPHIL # BLD AUTO: 0.16 10*3/MM3 (ref 0–0.4)
EOSINOPHIL NFR BLD AUTO: 3.2 % (ref 0.3–6.2)
ERYTHROCYTE [DISTWIDTH] IN BLOOD BY AUTOMATED COUNT: 12.9 % (ref 12.3–15.4)
FOLATE SERPL-MCNC: 11.5 NG/ML (ref 4.78–24.2)
FUNGUS WND CULT: NORMAL
GLOBULIN UR ELPH-MCNC: 2.6 GM/DL
GLUCOSE SERPL-MCNC: 86 MG/DL (ref 65–99)
HBA1C MFR BLD: 5.7 % (ref 4.8–5.6)
HCT VFR BLD AUTO: 42.4 % (ref 34–46.6)
HDLC SERPL-MCNC: 62 MG/DL (ref 40–60)
HGB BLD-MCNC: 14.1 G/DL (ref 12–15.9)
IMM GRANULOCYTES # BLD AUTO: 0.01 10*3/MM3 (ref 0–0.05)
IMM GRANULOCYTES NFR BLD AUTO: 0.2 % (ref 0–0.5)
IRON 24H UR-MRATE: 74 MCG/DL (ref 37–145)
IRON SATN MFR SERPL: 22 % (ref 20–50)
LDLC SERPL CALC-MCNC: 95 MG/DL (ref 0–100)
LDLC/HDLC SERPL: 1.52 {RATIO}
LYMPHOCYTES # BLD AUTO: 1.54 10*3/MM3 (ref 0.7–3.1)
LYMPHOCYTES NFR BLD AUTO: 31.2 % (ref 19.6–45.3)
MCH RBC QN AUTO: 31.3 PG (ref 26.6–33)
MCHC RBC AUTO-ENTMCNC: 33.3 G/DL (ref 31.5–35.7)
MCV RBC AUTO: 94 FL (ref 79–97)
MONOCYTES # BLD AUTO: 0.51 10*3/MM3 (ref 0.1–0.9)
MONOCYTES NFR BLD AUTO: 10.3 % (ref 5–12)
MYCOBACTERIUM SPEC CULT: NORMAL
NEUTROPHILS NFR BLD AUTO: 2.66 10*3/MM3 (ref 1.7–7)
NEUTROPHILS NFR BLD AUTO: 54.1 % (ref 42.7–76)
NIGHT BLUE STAIN TISS: NORMAL
NIGHT BLUE STAIN TISS: NORMAL
NRBC BLD AUTO-RTO: 0 /100 WBC (ref 0–0.2)
PLATELET # BLD AUTO: 252 10*3/MM3 (ref 140–450)
PMV BLD AUTO: 10.6 FL (ref 6–12)
POTASSIUM SERPL-SCNC: 4.1 MMOL/L (ref 3.5–5.2)
PROT SERPL-MCNC: 6.7 G/DL (ref 6–8.5)
RBC # BLD AUTO: 4.51 10*6/MM3 (ref 3.77–5.28)
SODIUM SERPL-SCNC: 136 MMOL/L (ref 136–145)
T-UPTAKE NFR SERPL: 1 TBI (ref 0.8–1.3)
T4 SERPL-MCNC: 7.54 MCG/DL (ref 4.5–11.7)
TIBC SERPL-MCNC: 341 MCG/DL (ref 298–536)
TRANSFERRIN SERPL-MCNC: 229 MG/DL (ref 200–360)
TRIGL SERPL-MCNC: 70 MG/DL (ref 0–150)
TSH SERPL DL<=0.05 MIU/L-ACNC: 1.75 UIU/ML (ref 0.27–4.2)
URATE SERPL-MCNC: 3.2 MG/DL (ref 2.4–5.7)
VIT B12 BLD-MCNC: 387 PG/ML (ref 211–946)
VLDLC SERPL-MCNC: 13 MG/DL (ref 5–40)
WBC NRBC COR # BLD AUTO: 4.93 10*3/MM3 (ref 3.4–10.8)

## 2025-02-27 PROCEDURE — 85025 COMPLETE CBC W/AUTO DIFF WBC: CPT

## 2025-02-27 PROCEDURE — 82746 ASSAY OF FOLIC ACID SERUM: CPT

## 2025-02-27 PROCEDURE — 83540 ASSAY OF IRON: CPT

## 2025-02-27 PROCEDURE — 82306 VITAMIN D 25 HYDROXY: CPT

## 2025-02-27 PROCEDURE — 84436 ASSAY OF TOTAL THYROXINE: CPT

## 2025-02-27 PROCEDURE — 80061 LIPID PANEL: CPT

## 2025-02-27 PROCEDURE — 84550 ASSAY OF BLOOD/URIC ACID: CPT

## 2025-02-27 PROCEDURE — 82607 VITAMIN B-12: CPT

## 2025-02-27 PROCEDURE — 80053 COMPREHEN METABOLIC PANEL: CPT

## 2025-02-27 PROCEDURE — 36415 COLL VENOUS BLD VENIPUNCTURE: CPT

## 2025-02-27 PROCEDURE — 84443 ASSAY THYROID STIM HORMONE: CPT

## 2025-02-27 PROCEDURE — 84479 ASSAY OF THYROID (T3 OR T4): CPT

## 2025-02-27 PROCEDURE — 83036 HEMOGLOBIN GLYCOSYLATED A1C: CPT

## 2025-02-27 PROCEDURE — 84466 ASSAY OF TRANSFERRIN: CPT

## 2025-02-27 NOTE — TELEPHONE ENCOUNTER
Caller: Pau Griffith    Relationship: Self    Best call back number: [phone]    What is the best time to reach you: ANY    Who are you requesting to speak with (clinical staff, provider,  specific staff member): CLINICAL     What was the call regarding: PAU RECEIVED A EMAIL FROM THE PHARMACY THAT STATED THAT COVERAGE OF STATUS APPROVED FOR A MEDICATION     THAT THE MEDICATION  BEGINS WITH A P AND THAT DR ROSA IS THE PRESCRIBER     SHE IS NOT SURE WHAT MEDICATION THE EMAIL IS REFERRING TO AND WHAT IT IS FOR      PLEASE ADVISE

## 2025-02-28 ENCOUNTER — PREP FOR SURGERY (OUTPATIENT)
Dept: OTHER | Facility: HOSPITAL | Age: 80
End: 2025-02-28
Payer: MEDICARE

## 2025-02-28 ENCOUNTER — OFFICE VISIT (OUTPATIENT)
Dept: SURGERY | Facility: CLINIC | Age: 80
End: 2025-02-28
Payer: MEDICARE

## 2025-02-28 ENCOUNTER — TELEPHONE (OUTPATIENT)
Dept: ONCOLOGY | Facility: HOSPITAL | Age: 80
End: 2025-02-28

## 2025-02-28 VITALS
SYSTOLIC BLOOD PRESSURE: 186 MMHG | HEART RATE: 80 BPM | HEIGHT: 68 IN | DIASTOLIC BLOOD PRESSURE: 86 MMHG | BODY MASS INDEX: 27.89 KG/M2 | WEIGHT: 184 LBS

## 2025-02-28 DIAGNOSIS — C34.92 ADENOCARCINOMA, LUNG, LEFT: Primary | ICD-10-CM

## 2025-02-28 PROBLEM — Z79.899 ENCOUNTER FOR LONG-TERM CURRENT USE OF HIGH RISK MEDICATION: Status: ACTIVE | Noted: 2025-02-28

## 2025-02-28 RX ORDER — DEXAMETHASONE 4 MG/1
TABLET ORAL
Qty: 6 TABLET | Refills: 3 | Status: SHIPPED | OUTPATIENT
Start: 2025-02-28

## 2025-02-28 RX ORDER — FERROUS SULFATE 324(65)MG
324 TABLET, DELAYED RELEASE (ENTERIC COATED) ORAL NIGHTLY
COMMUNITY

## 2025-02-28 RX ORDER — ONDANSETRON 8 MG/1
8 TABLET, FILM COATED ORAL 3 TIMES DAILY PRN
Qty: 30 TABLET | Refills: 3 | Status: SHIPPED | OUTPATIENT
Start: 2025-02-28

## 2025-02-28 RX ORDER — OLANZAPINE 5 MG/1
5 TABLET ORAL NIGHTLY
Qty: 4 TABLET | Refills: 3 | Status: SHIPPED | OUTPATIENT
Start: 2025-02-28

## 2025-02-28 RX ORDER — FOLIC ACID 1 MG/1
1 TABLET ORAL DAILY
Qty: 30 TABLET | Refills: 4 | Status: SHIPPED | OUTPATIENT
Start: 2025-02-28

## 2025-02-28 NOTE — PROGRESS NOTES
Chief Complaint:  Malignant neoplasm of lower lobe of left lung (Port consult)    Primary Care Provider: Mary Cruz APRN    Referring Provider: Reinaldo Banks,*    History of Present Illness  Lolis Griffith is a 79 y.o. female referred by Reinaldo Banks, * to have a port-a-catheter placed.    The patient was diagnosed with left sided lung cancer    The patient is going to receive IV therapy and port-a-catheter placement is needed.      Allergies: Patient has no known allergies.    Outpatient Medications Marked as Taking for the 2/28/25 encounter (Office Visit) with Abhinav Martin MD   Medication Sig Dispense Refill    Breztri Aerosphere 160-9-4.8 MCG/ACT aerosol inhaler Inhale 2 puffs 2 (Two) Times a Day.      cetirizine (zyrTEC) 10 MG tablet Take 1 tablet by mouth.      Cholecalciferol 125 MCG (5000 UT) tablet Take 1 tablet by mouth Daily.      dexAMETHasone (DECADRON) 4 MG tablet Take 1 tablet twice daily starting the day before chemo, day of chemo, and day after chemo.  Take with food. 6 tablet 3    Docusate Sodium 100 MG capsule Take 1 tablet by mouth.      estradiol (ESTRACE) 0.1 MG/GM vaginal cream USE AS DIRECTED 3 TIMES A WEEK      ezetimibe (ZETIA) 10 MG tablet Take 1 tablet by mouth Daily.      fenofibrate (TRICOR) 145 MG tablet       ferrous sulfate 324 (65 Fe) MG tablet delayed-release EC tablet Take 1 tablet by mouth Daily With Breakfast.      folic acid (FOLVITE) 1 MG tablet Take 1 tablet by mouth Daily. Start at least 7 days prior to chemotherapy until at least 3 weeks after all chemotherapy. 30 tablet 4    lisinopril (PRINIVIL,ZESTRIL) 5 MG tablet Take 1 tablet by mouth Daily. 30 tablet 3    loratadine (CLARITIN) 10 MG tablet       Magnesium 250 MG tablet Take 1 tablet by mouth Daily.      montelukast (SINGULAIR) 10 MG tablet Take 1 tablet by mouth Daily.      OLANZapine (zyPREXA) 5 MG tablet Take 1 tablet by mouth Every Night. Take nightly x 4 starting night of  "chemotherapy. 4 tablet 3    omeprazole (priLOSEC) 40 MG capsule       ondansetron (ZOFRAN) 8 MG tablet Take 1 tablet by mouth 3 (Three) Times a Day As Needed for Nausea or Vomiting. 30 tablet 3    simvastatin (ZOCOR) 20 MG tablet Take 1 tablet by mouth Daily.         Past Medical History:    History of sinus problem    Hypertension    Other hyperlipidemia        Past Surgical History:    BLADDER REPAIR    BRONCHOSCOPY WITH ION ROBOTIC ASSIST    Procedure: BRONCHOSCOPY WITH ION ROBOT: BAL, REBUS, NEEDLE ASPIRATE, BIOPSIES insertion of lighted robot navigated instrument to view inside the lung;  Surgeon: Stevie Carson DO;  Location: [facility] MAIN OR;  Service: Robotics - Pulmonary;  Laterality: Left;    LAPAROSCOPIC CHOLECYSTECTOMY    RECTAL PROLAPSE REPAIR    THYROID SURGERY    TOTAL ABDOMINAL HYSTERECTOMY WITH SALPINGO OOPHORECTOMY    TOTAL HIP ARTHROPLASTY       Family History:   Family History   Problem Relation Age of Onset    Cancer Brother         Social History:  Social History     Tobacco Use    Smoking status: Never     Passive exposure: Never    Smokeless tobacco: Never   Substance Use Topics    Alcohol use: Not Currently       Objective     Vital Signs:  BP (!) 186/86 Comment: PT states that she is nervoues  Pulse 80   Ht 172.7 cm (67.99\")   Wt 83.5 kg (184 lb)   BMI 27.99 kg/m²   Respiratory:  breathing not labored, respiratory effort appears normal  Cardiovascular:  heart regular rate  Musculoskeletal: moving all extremities symmetrically and purposefully   present    Assessment:  Diagnoses and all orders for this visit:    1. Adenocarcinoma, lung, left (Primary)        Plan:  Port-a-catheter placement    Discussion: Indications, options, risks, benefits, and expected outcomes of planned surgery were discussed with the patient and she agrees to proceed.    Abhinav Martin MD  02/28/2025    Electronically signed by Abhinav Martin MD, 02/28/25, 1:52 PM EST.        "

## 2025-02-28 NOTE — H&P (VIEW-ONLY)
Chief Complaint:  Malignant neoplasm of lower lobe of left lung (Port consult)    Primary Care Provider: Mary Cruz APRN    Referring Provider: Reinaldo Banks,*    History of Present Illness  Lolis Griffith is a 79 y.o. female referred by Reinaldo Banks, * to have a port-a-catheter placed.    The patient was diagnosed with left sided lung cancer    The patient is going to receive IV therapy and port-a-catheter placement is needed.      Allergies: Patient has no known allergies.    Outpatient Medications Marked as Taking for the 2/28/25 encounter (Office Visit) with Abhinav Martin MD   Medication Sig Dispense Refill    Breztri Aerosphere 160-9-4.8 MCG/ACT aerosol inhaler Inhale 2 puffs 2 (Two) Times a Day.      cetirizine (zyrTEC) 10 MG tablet Take 1 tablet by mouth.      Cholecalciferol 125 MCG (5000 UT) tablet Take 1 tablet by mouth Daily.      dexAMETHasone (DECADRON) 4 MG tablet Take 1 tablet twice daily starting the day before chemo, day of chemo, and day after chemo.  Take with food. 6 tablet 3    Docusate Sodium 100 MG capsule Take 1 tablet by mouth.      estradiol (ESTRACE) 0.1 MG/GM vaginal cream USE AS DIRECTED 3 TIMES A WEEK      ezetimibe (ZETIA) 10 MG tablet Take 1 tablet by mouth Daily.      fenofibrate (TRICOR) 145 MG tablet       ferrous sulfate 324 (65 Fe) MG tablet delayed-release EC tablet Take 1 tablet by mouth Daily With Breakfast.      folic acid (FOLVITE) 1 MG tablet Take 1 tablet by mouth Daily. Start at least 7 days prior to chemotherapy until at least 3 weeks after all chemotherapy. 30 tablet 4    lisinopril (PRINIVIL,ZESTRIL) 5 MG tablet Take 1 tablet by mouth Daily. 30 tablet 3    loratadine (CLARITIN) 10 MG tablet       Magnesium 250 MG tablet Take 1 tablet by mouth Daily.      montelukast (SINGULAIR) 10 MG tablet Take 1 tablet by mouth Daily.      OLANZapine (zyPREXA) 5 MG tablet Take 1 tablet by mouth Every Night. Take nightly x 4 starting night of  "chemotherapy. 4 tablet 3    omeprazole (priLOSEC) 40 MG capsule       ondansetron (ZOFRAN) 8 MG tablet Take 1 tablet by mouth 3 (Three) Times a Day As Needed for Nausea or Vomiting. 30 tablet 3    simvastatin (ZOCOR) 20 MG tablet Take 1 tablet by mouth Daily.         Past Medical History:    History of sinus problem    Hypertension    Other hyperlipidemia        Past Surgical History:    BLADDER REPAIR    BRONCHOSCOPY WITH ION ROBOTIC ASSIST    Procedure: BRONCHOSCOPY WITH ION ROBOT: BAL, REBUS, NEEDLE ASPIRATE, BIOPSIES insertion of lighted robot navigated instrument to view inside the lung;  Surgeon: Stevie Carson DO;  Location: [facility] MAIN OR;  Service: Robotics - Pulmonary;  Laterality: Left;    LAPAROSCOPIC CHOLECYSTECTOMY    RECTAL PROLAPSE REPAIR    THYROID SURGERY    TOTAL ABDOMINAL HYSTERECTOMY WITH SALPINGO OOPHORECTOMY    TOTAL HIP ARTHROPLASTY       Family History:   Family History   Problem Relation Age of Onset    Cancer Brother         Social History:  Social History     Tobacco Use    Smoking status: Never     Passive exposure: Never    Smokeless tobacco: Never   Substance Use Topics    Alcohol use: Not Currently       Objective     Vital Signs:  BP (!) 186/86 Comment: PT states that she is nervoues  Pulse 80   Ht 172.7 cm (67.99\")   Wt 83.5 kg (184 lb)   BMI 27.99 kg/m²   Respiratory:  breathing not labored, respiratory effort appears normal  Cardiovascular:  heart regular rate  Musculoskeletal: moving all extremities symmetrically and purposefully   present    Assessment:  Diagnoses and all orders for this visit:    1. Adenocarcinoma, lung, left (Primary)        Plan:  Port-a-catheter placement    Discussion: Indications, options, risks, benefits, and expected outcomes of planned surgery were discussed with the patient and she agrees to proceed.    Abhinav Martin MD  02/28/2025    Electronically signed by Abhinav Martin MD, 02/28/25, 1:52 PM EST.        "

## 2025-02-28 NOTE — TELEPHONE ENCOUNTER
Caller: Lolis Griffith    Relationship: Self    Best call back number: [phone]    Who are you requesting to speak with (clinical staff, provider,  specific staff member): INFUSION SCHEDULING      What was the call regarding: PT STATES SHE IS HAVING HER PORT PLACED ON 3/4 AND WOULD LIKE TO SET UP INFUSION SCHEDULE    I

## 2025-03-03 ENCOUNTER — TELEPHONE (OUTPATIENT)
Dept: SURGERY | Facility: CLINIC | Age: 80
End: 2025-03-03
Payer: MEDICARE

## 2025-03-03 ENCOUNTER — HOSPITAL ENCOUNTER (OUTPATIENT)
Facility: HOSPITAL | Age: 80
Discharge: HOME | End: 2025-03-03
Admitting: THORACIC SURGERY (CARDIOTHORACIC VASCULAR SURGERY)
Payer: MEDICARE

## 2025-03-03 DIAGNOSIS — R06.02 SHORTNESS OF BREATH: ICD-10-CM

## 2025-03-03 DIAGNOSIS — C34.30 NSCLC OF LOWER LOBE: ICD-10-CM

## 2025-03-03 PROCEDURE — 78452 HT MUSCLE IMAGE SPECT MULT: CPT

## 2025-03-03 PROCEDURE — 78452 HT MUSCLE IMAGE SPECT MULT: CPT | Performed by: STUDENT IN AN ORGANIZED HEALTH CARE EDUCATION/TRAINING PROGRAM

## 2025-03-03 PROCEDURE — 93017 CV STRESS TEST TRACING ONLY: CPT

## 2025-03-03 PROCEDURE — A9502 TC99M TETROFOSMIN: HCPCS | Performed by: THORACIC SURGERY (CARDIOTHORACIC VASCULAR SURGERY)

## 2025-03-03 PROCEDURE — 93016 CV STRESS TEST SUPVJ ONLY: CPT | Performed by: NURSE PRACTITIONER

## 2025-03-03 PROCEDURE — 93018 CV STRESS TEST I&R ONLY: CPT | Performed by: STUDENT IN AN ORGANIZED HEALTH CARE EDUCATION/TRAINING PROGRAM

## 2025-03-03 PROCEDURE — 25010000002 REGADENOSON 0.4 MG/5ML SOLUTION: Performed by: THORACIC SURGERY (CARDIOTHORACIC VASCULAR SURGERY)

## 2025-03-03 PROCEDURE — 34310000005 TECHNETIUM TETROFOSMIN KIT: Performed by: THORACIC SURGERY (CARDIOTHORACIC VASCULAR SURGERY)

## 2025-03-03 RX ORDER — OMEPRAZOLE 40 MG/1
40 CAPSULE, DELAYED RELEASE ORAL DAILY
COMMUNITY

## 2025-03-03 RX ORDER — REGADENOSON 0.08 MG/ML
0.4 INJECTION, SOLUTION INTRAVENOUS
Status: COMPLETED | OUTPATIENT
Start: 2025-03-03 | End: 2025-03-03

## 2025-03-03 RX ORDER — MAGNESIUM GLUCONATE 27 MG(500)
500 TABLET ORAL NIGHTLY
COMMUNITY

## 2025-03-03 RX ADMIN — REGADENOSON 0.4 MG: 0.08 INJECTION, SOLUTION INTRAVENOUS at 09:00

## 2025-03-03 RX ADMIN — TETROFOSMIN 1 DOSE: 1.38 INJECTION, POWDER, LYOPHILIZED, FOR SOLUTION INTRAVENOUS at 09:00

## 2025-03-03 RX ADMIN — TETROFOSMIN 1 DOSE: 1.38 INJECTION, POWDER, LYOPHILIZED, FOR SOLUTION INTRAVENOUS at 06:50

## 2025-03-03 NOTE — TELEPHONE ENCOUNTER
I spoke with pt. She was wanting to know when PAT was going to call her since her surgery was tomorrow. I reached out to PAT & asked them to give her a call. Pt had some questions about her medications.

## 2025-03-03 NOTE — PRE-PROCEDURE INSTRUCTIONS
IMPORTANT INSTRUCTIONS - PRE-ADMISSION TESTING  DO NOT EAT OR CHEW anything after midnight the night before your procedure.    NPO AFTER MN EXCEPT SIPS OF WATER TO TAKE MEDICATIONS LISTED BELOW  Take the following medications the morning of your procedure with JUST A SIP OF WATER:  __USE BREZTRI INHALER AND BRING WITH YOU, FENOFIBRATE, LORATADINE, OMEPRAZOLE, SIMVASTATIN, _____________________________________________________________________________________________________________________________________________________________________________________    DO NOT BRING your medications to the hospital with you, UNLESS something has changed since your PRE-Admission Testing appointment.  Hold all vitamins, supplements, and NSAIDS (Non- steroidal anti-inflammatory meds) for one week prior to surgery (you MAY take Tylenol or Acetaminophen).  If you are diabetic, check your blood sugar the morning of your procedure. If it is less than 70 or if you are feeling symptomatic, call the following number for further instructions: [phone]  Use your inhalers/nebulizers as usual, the morning of your procedure. BRING YOUR INHALERS with you.   Bring your CPAP or BIPAP to hospital, ONLY IF YOU WILL BE SPENDING THE NIGHT.   Make sure you have a ride home and have someone who will stay with you the day of your procedure after you go home.  If you have any questions, please call your Pre-Admission Testing Nurse, ___NOAM_____________ at [phone]____________.   Per anesthesia request, do not smoke for 24 hours before your procedure or as instructed by your surgeon.    WILL CALL ON 3/3/25        NORMALLY BETWEEN 1 AND 4 PM TO GIVE OFFICIAL ARRIVAL TIME FOR DAY OF PROCEDURE  BATHING INSTRUCTIONS GIVEN. NO JEWELRY OF ANY TYPE OR NAIL POLISH UPPER OR LOWER EXT DAY OF PROCEDURE   COME TO ENTRANCE C [facility] MAIN DOOR DAY OF PROCEDURE  CASH,  OR CARD FOR MEDS TO BED IF INDICATED AT DISCHARGE

## 2025-03-03 NOTE — TELEPHONE ENCOUNTER
PATIENT CALLED AND SAID SHE IS SCHEDULED FOR PORT PLACEMENT 03/04/25.  SHE WAS NOT CALLED WITH AN ARRIVAL TIME, BUT SAID SHE HAD A SCAN TODAY, AND SOMEONE LOOKED IT UP AND TOLD HER IT WAS AT NOON.    I TOLD HER THAT THE HOSPITAL CALLS WITH AN ARRIVAL TIME, AND SHOULD CALL HER TODAY.    SHE SAID HER PAPER STATES NOTHING TO DRINK AFTER MIDNIGHT.  SHE WANTS TO KNOW IF SHE CAN DRINK SOMETHING AFTER MIDNIGHT, AND IF SHE CAN TAKE HER MORNING MEDICATION.    #[phone]

## 2025-03-04 ENCOUNTER — HOSPITAL ENCOUNTER (OUTPATIENT)
Facility: HOSPITAL | Age: 80
Setting detail: HOSPITAL OUTPATIENT SURGERY
Discharge: HOME | End: 2025-03-04
Attending: SURGERY | Admitting: SURGERY
Payer: MEDICARE

## 2025-03-04 ENCOUNTER — APPOINTMENT (OUTPATIENT)
Dept: GENERAL RADIOLOGY | Facility: HOSPITAL | Age: 80
End: 2025-03-04
Payer: MEDICARE

## 2025-03-04 ENCOUNTER — ANESTHESIA EVENT (OUTPATIENT)
Dept: PERIOP | Facility: HOSPITAL | Age: 80
End: 2025-03-04
Payer: MEDICARE

## 2025-03-04 ENCOUNTER — ANESTHESIA (OUTPATIENT)
Dept: PERIOP | Facility: HOSPITAL | Age: 80
End: 2025-03-04
Payer: MEDICARE

## 2025-03-04 VITALS
OXYGEN SATURATION: 94 % | TEMPERATURE: 98.1 F | HEIGHT: 66 IN | BODY MASS INDEX: 28.49 KG/M2 | RESPIRATION RATE: 16 BRPM | HEART RATE: 55 BPM | SYSTOLIC BLOOD PRESSURE: 125 MMHG | DIASTOLIC BLOOD PRESSURE: 67 MMHG | WEIGHT: 177.25 LBS

## 2025-03-04 DIAGNOSIS — C34.92 ADENOCARCINOMA, LUNG, LEFT: ICD-10-CM

## 2025-03-04 LAB
BH CV IMMEDIATE POST TECH DATA BLOOD PRESSURE: NORMAL MMHG
BH CV IMMEDIATE POST TECH DATA HEART RATE: 82 BPM
BH CV IMMEDIATE POST TECH DATA OXYGEN SATS: 98 %
BH CV REST NUCLEAR ISOTOPE DOSE: 9.5 MCI
BH CV SIX MINUTE RECOVERY TECH DATA BLOOD PRESSURE: NORMAL
BH CV SIX MINUTE RECOVERY TECH DATA HEART RATE: 79 BPM
BH CV SIX MINUTE RECOVERY TECH DATA OXYGEN SATURATION: 98 %
BH CV SIX MINUTE RECOVERY TECH DATA SYMPTOMS: NORMAL
BH CV STRESS BP STAGE 1: NORMAL
BH CV STRESS COMMENTS STAGE 1: NORMAL
BH CV STRESS DOSE REGADENOSON STAGE 1: 0.4
BH CV STRESS DURATION MIN STAGE 1: 0
BH CV STRESS DURATION SEC STAGE 1: 10
BH CV STRESS HR STAGE 1: 74
BH CV STRESS NUCLEAR ISOTOPE DOSE: 38 MCI
BH CV STRESS O2 STAGE 1: 98
BH CV STRESS PROTOCOL 1: NORMAL
BH CV STRESS RECOVERY BP: NORMAL MMHG
BH CV STRESS RECOVERY HR: 78 BPM
BH CV STRESS RECOVERY O2: 98 %
BH CV STRESS STAGE 1: 1
BH CV THREE MINUTE POST TECH DATA BLOOD PRESSURE: NORMAL MMHG
BH CV THREE MINUTE POST TECH DATA HEART RATE: 80 BPM
BH CV THREE MINUTE POST TECH DATA OXYGEN SATURATION: 97 %
MAXIMAL PREDICTED HEART RATE: 143 BPM
PERCENT MAX PREDICTED HR: 67.13 %
SPECT HRT GATED+EF W RNC IV: 54 %
STRESS BASELINE BP: NORMAL MMHG
STRESS BASELINE HR: 58 BPM
STRESS O2 SAT REST: 98 %
STRESS PERCENT HR: 79 %
STRESS POST O2 SAT PEAK: 98 %
STRESS POST PEAK BP: NORMAL MMHG
STRESS POST PEAK HR: 96 BPM
STRESS TARGET HR: 122 BPM

## 2025-03-04 PROCEDURE — 76000 FLUOROSCOPY <1 HR PHYS/QHP: CPT

## 2025-03-04 PROCEDURE — 25010000002 CEFAZOLIN PER 500 MG: Performed by: SURGERY

## 2025-03-04 PROCEDURE — 71045 X-RAY EXAM CHEST 1 VIEW: CPT

## 2025-03-04 PROCEDURE — 77001 FLUOROGUIDE FOR VEIN DEVICE: CPT | Performed by: SURGERY

## 2025-03-04 PROCEDURE — C1788 PORT, INDWELLING, IMP: HCPCS | Performed by: SURGERY

## 2025-03-04 PROCEDURE — 25810000003 LACTATED RINGERS PER 1000 ML: Performed by: ANESTHESIOLOGY

## 2025-03-04 PROCEDURE — 76937 US GUIDE VASCULAR ACCESS: CPT | Performed by: SURGERY

## 2025-03-04 PROCEDURE — 36561 INSERT TUNNELED CV CATH: CPT | Performed by: SURGERY

## 2025-03-04 PROCEDURE — 25010000002 PROPOFOL 10 MG/ML EMULSION: Performed by: NURSE ANESTHETIST, CERTIFIED REGISTERED

## 2025-03-04 PROCEDURE — 25010000002 LIDOCAINE PF 2% 2 % SOLUTION: Performed by: NURSE ANESTHETIST, CERTIFIED REGISTERED

## 2025-03-04 PROCEDURE — 25010000002 BUPIVACAINE (PF) 0.25 % SOLUTION: Performed by: SURGERY

## 2025-03-04 DEVICE — POWERPORT CLEARVUE ISP IMPLANTABLE PORT WITH ATTACHABLE 8F POLYURETHANE OPEN-ENDED SINGLE-LUMEN VENOUS CATHETER PROCEDURAL KIT
Type: IMPLANTABLE DEVICE | Site: CHEST | Status: FUNCTIONAL
Brand: POWERPORT CLEARVUE

## 2025-03-04 RX ORDER — PROMETHAZINE HYDROCHLORIDE 25 MG/1
25 SUPPOSITORY RECTAL ONCE AS NEEDED
Status: DISCONTINUED | OUTPATIENT
Start: 2025-03-04 | End: 2025-03-04 | Stop reason: HOSPADM

## 2025-03-04 RX ORDER — HYDROCODONE BITARTRATE AND ACETAMINOPHEN 5; 325 MG/1; MG/1
1 TABLET ORAL EVERY 6 HOURS PRN
Qty: 5 TABLET | Refills: 0 | Status: SHIPPED | OUTPATIENT
Start: 2025-03-04

## 2025-03-04 RX ORDER — OXYCODONE HYDROCHLORIDE 5 MG/1
5 TABLET ORAL
Status: DISCONTINUED | OUTPATIENT
Start: 2025-03-04 | End: 2025-03-04 | Stop reason: HOSPADM

## 2025-03-04 RX ORDER — PROMETHAZINE HYDROCHLORIDE 12.5 MG/1
25 TABLET ORAL ONCE AS NEEDED
Status: DISCONTINUED | OUTPATIENT
Start: 2025-03-04 | End: 2025-03-04 | Stop reason: HOSPADM

## 2025-03-04 RX ORDER — LIDOCAINE HYDROCHLORIDE 20 MG/ML
INJECTION, SOLUTION EPIDURAL; INFILTRATION; INTRACAUDAL; PERINEURAL AS NEEDED
Status: DISCONTINUED | OUTPATIENT
Start: 2025-03-04 | End: 2025-03-04 | Stop reason: SURG

## 2025-03-04 RX ORDER — DEXMEDETOMIDINE HYDROCHLORIDE 100 UG/ML
INJECTION, SOLUTION INTRAVENOUS AS NEEDED
Status: DISCONTINUED | OUTPATIENT
Start: 2025-03-04 | End: 2025-03-04 | Stop reason: SURG

## 2025-03-04 RX ORDER — PROPOFOL 10 MG/ML
VIAL (ML) INTRAVENOUS AS NEEDED
Status: DISCONTINUED | OUTPATIENT
Start: 2025-03-04 | End: 2025-03-04 | Stop reason: SURG

## 2025-03-04 RX ORDER — BUPIVACAINE HYDROCHLORIDE 2.5 MG/ML
INJECTION, SOLUTION EPIDURAL; INFILTRATION; INTRACAUDAL AS NEEDED
Status: DISCONTINUED | OUTPATIENT
Start: 2025-03-04 | End: 2025-03-04 | Stop reason: HOSPADM

## 2025-03-04 RX ORDER — ACETAMINOPHEN 500 MG
500 TABLET ORAL ONCE
Status: COMPLETED | OUTPATIENT
Start: 2025-03-04 | End: 2025-03-04

## 2025-03-04 RX ORDER — SODIUM CHLORIDE, SODIUM LACTATE, POTASSIUM CHLORIDE, CALCIUM CHLORIDE 600; 310; 30; 20 MG/100ML; MG/100ML; MG/100ML; MG/100ML
9 INJECTION, SOLUTION INTRAVENOUS CONTINUOUS PRN
Status: DISCONTINUED | OUTPATIENT
Start: 2025-03-04 | End: 2025-03-04 | Stop reason: HOSPADM

## 2025-03-04 RX ORDER — ONDANSETRON 2 MG/ML
4 INJECTION INTRAMUSCULAR; INTRAVENOUS ONCE AS NEEDED
Status: DISCONTINUED | OUTPATIENT
Start: 2025-03-04 | End: 2025-03-04 | Stop reason: HOSPADM

## 2025-03-04 RX ADMIN — SODIUM CHLORIDE, SODIUM LACTATE, POTASSIUM CHLORIDE, CALCIUM CHLORIDE 9 ML/HR: 20; 30; 600; 310 INJECTION, SOLUTION INTRAVENOUS at 11:20

## 2025-03-04 RX ADMIN — PROPOFOL 30 MG: 10 INJECTION, EMULSION INTRAVENOUS at 12:12

## 2025-03-04 RX ADMIN — DEXMEDETOMIDINE 5 MCG: 100 INJECTION, SOLUTION, CONCENTRATE INTRAVENOUS at 12:30

## 2025-03-04 RX ADMIN — DEXMEDETOMIDINE 5 MCG: 100 INJECTION, SOLUTION, CONCENTRATE INTRAVENOUS at 12:27

## 2025-03-04 RX ADMIN — PROPOFOL 200 MCG/KG/MIN: 10 INJECTION, EMULSION INTRAVENOUS at 12:12

## 2025-03-04 RX ADMIN — PROPOFOL 30 MG: 10 INJECTION, EMULSION INTRAVENOUS at 12:14

## 2025-03-04 RX ADMIN — DEXMEDETOMIDINE 5 MCG: 100 INJECTION, SOLUTION, CONCENTRATE INTRAVENOUS at 12:23

## 2025-03-04 RX ADMIN — DEXMEDETOMIDINE 10 MCG: 100 INJECTION, SOLUTION, CONCENTRATE INTRAVENOUS at 12:19

## 2025-03-04 RX ADMIN — ACETAMINOPHEN 500 MG: 500 TABLET ORAL at 11:20

## 2025-03-04 RX ADMIN — SODIUM CHLORIDE 2000 MG: 9 INJECTION, SOLUTION INTRAVENOUS at 12:09

## 2025-03-04 RX ADMIN — LIDOCAINE HYDROCHLORIDE 100 MG: 20 INJECTION, SOLUTION INTRAVENOUS at 12:12

## 2025-03-04 NOTE — DISCHARGE INSTRUCTIONS
Dr. Martin's Instructions          DIET  Resume your regular diet.     ACTIVITY  Do not lift anything greater than 15 pounds with the arm on the same side the port was placed for one week.  After one week, you have no activity restrictions and may gradually increase your activities using common sense.     WOUND CARE & SHOWERING/BATHING  Your incisions are covered with a plastic type material that will flake off on its own in the next few weeks.  If the plastic type material has not flaked off on its own by 2 weeks after your surgery then use tweezers to pull it off.  You have sutures in your incisions but they are placed below the level of the skin and they will slowly dissolve (they do not need to be removed).  The skin around your incisions will likely have some bruising.  This is normal.  You can shower beginning tomorrow but wait two weeks after your surgery before taking any tub baths.     HOME MEDICATIONS  Resume all of your normal home medications.     PAIN CONTROL  You will receive a narcotic pain medicine prescription before you are discharged home.  Be sure to take the narcotic pain medication with some food so as not to upset your stomach.  Do not drive while you are taking the prescription pain medication.  Take Tylenol or Motrin for mild pain.     BOWEL MOVEMENTS  It is not unusual for narcotic pain medications to cause constipation.  Also, the medications and anesthesia you received for your surgery can have a constipating effect.  To help avoid constipation, drink at least four eight-ounce glasses of water each day and use over-the-counter laxatives/stool softeners (dulcolax, milk of magnesia, senokot, etc.).  I recommend drinking the aforementioned amount of water daily and taking 30 ml of milk of magnesia two times each day while you are using the prescribed narcotic pain medication.  If your bowel movements become too loose or too frequent, then simply stop following these recommendations unless  you start to feel constipated.     FOLLOW-UP VISIT & QUESTIONS/CONCERNS  Call Dr. Alanis office at [phone] and schedule a follow-up appointment for about two to three  weeks after your surgery date.  However, if you are doing fine and don´t have any concerns then simply cancel the follow-up appointment.  Should you have any questions or concerns, please call Dr. Alanis office.

## 2025-03-04 NOTE — OP NOTE
INSERTION VENOUS ACCESS DEVICE  Procedure Report    Patient Name:  Lolis Griffith  YOB: 1945    Date of Surgery:  3/4/2025     Pre-op Diagnosis:   Adenocarcinoma, lung, left [C34.92]    Pre-Op Diagnosis Codes:      * Adenocarcinoma, lung, left [C34.92]       Post-op Diagnosis:   Post-Op Diagnosis Codes:     * Adenocarcinoma, lung, left [C34.92]    Procedure:  Venous access device placement (CPT 95665)    Staff:  Surgeon(s):  Abhinav Martin MD    Assistant:  OR nursing staff    Anesthesia: Monitored Anesthesia Care    Estimated Blood Loss: Minimal    Complications:  None    Drains:  None    Packing:  None    Implants:    Implant Name Type Inv. Item Serial No.  Lot No. LRB No. Used Action   KT PRT POWERPORT CLEARVUE MOD/BUMP INTERMD 8F 45CM - [id number] Implant KT PRT POWERPORT CLEARVUE MOD/BUMP INTERMD 8F 45CM  BARD PERIPHERAL VASCULAR [id number] N/A 1 Implanted     Specimen: None    Indications:  See my preop note.     Findings:  Bard PowerPort ClearVUE Implantable Port placed via right internal jugular vein.    Description of Procedure: Patient was taken to the operating room and placed supine on the operating table.  Timeout verification was performed. MAC anesthesia was administered.  Patient was prepped and draped in usual fashion.  Using ultrasound, the right internal jugular vein was identified and then accessed with a needle.  A guidewire was placed through the needle and the needle was withdrawn.  Fluoroscopy was used to confirm the guidewire was positioned appropriately in the superior vena cava.  A subcutaneous pocket was created at the right upper chest to accommodate the port.  The inner dilator was placed inside of the tear-away sheath and both were placed over the guidewire into the right internal jugular vein.  The inner guidewire and dilator were removed.  The catheter was placed through the tear-away sheath and the sheath was withdrawn.  The tunneler trocar was used to  tunnel the catheter to the subcutaneous pocket.  Then under direct visualization with fluoroscopy, the catheter was pulled back until the tip was positioned appropriately in the superior vena cava.  The catheter was cut to the appropriate length and the locking cap was placed onto the catheter.   The catheter was connected to the port, the locking cap was secured, and the port was placed within the subcutaneous pocket.  I accessed the port with a Carcamo needle.  I could easily aspirate venous blood.  The port was then flushed with heparinized solution.  The wound was closed appropriately with buried absorbable suture followed by appropriate dressings.  No complications.  Sponge needle and instrument counts were correct.  Patient was transported to the recovery area in stable condition.      Abhinav Martin MD     Date: 3/4/2025  Time: 12:52 EST    Electronically signed by Abhinav Martin MD, 03/04/25, 12:52 PM EST.

## 2025-03-04 NOTE — PROGRESS NOTES
CHEMOTHERAPY PREPARATION    Lolis Griffith  [id number]  1945    Chief Complaint:   Chemotherapy Education    History of present illness:  Lolis Griffith is a 79 y.o. year old female who is here today for chemotherapy preparation and needs assessment.  The patient has been diagnosed with lung cancer and is scheduled to begin treatment with keytruda, pemetrexed, and carboplatin every 21 days for 4 cycles.    Oncology History:    Oncology/Hematology History Overview Note   1/30/25: CT Chest done for lung nodule: Stable irregular 1.6 cm left lower lobe nodule, potentially neoplastic. Per Fleischner guidelines this could be assessed by short-term follow-up 3-month chest CT, PET/CT and/or biopsy. Recommend pulmonary consultation for management. Infectious/inflammatory lower lobe predominant tree-in-bud nodularity slightly waxing and waning since previous comparison. Findings could reflect bronchiolitis, microaspiration or atypical infection. Recommend attention on follow-up imaging.   Stable mild mediastinal adenopathy which could be reactive or metastatic pending etiology of the left lower lobe nodule. Exophytic indeterminate right upper pole renal lesion measuring 4.1 cm. At this size nonemergent renal ultrasound is recommended to assess if lesion is solid or cystic. Aortic and severe coronary atherosclerotic disease.    1/30/25: ION bronchoscopy with biopsy: BAL culture positive for >100K CFU/mL of Pseudomonas. Left lower lobe biopsy positive for adenocarcinoma, lepidic pattern, lung primary. EGFR, ROS, ALK negative. PDL1 positive at 60%.     Started on IV antibiotic (Cefepime) x 10 days for lung pseudomonas per pulmonary team.     2/6/25: MRI brain negative for intracranial disease    2/12/25: NM PET: A 1.6 cm left lower lobe nodule is FDG avid with SUV max 7.4. There is mild bibasilar atelectasis. There is FDG-avid mediastinal and left hilar lymphadenopathy. An index subcarinal lymph node on image 85  measures 2.5 x 1.3 cm with SUV max 6.6. An index left infrahilar lymph node on image 94 has SUV max 9.4, with note that size measurement is difficult on this unenhanced exam. No metastatic disease outside of chest seen.      Malignant neoplasm of lower lobe of left lung   2/24/2025 Initial Diagnosis    Malignant neoplasm of lower lobe of left lung     2/24/2025 Cancer Staged    Staging form: Lung, AJCC V9  - Clinical: Stage IIB (cT1b, cN2a, cM0) - Signed by Reinaldo Banks MD on 2/24/2025     3/12/2025 -  Chemotherapy    OP LUNG Pembrolizumab 200 mg / Pemetrexed / Carboplatin AUC=5         The current medication list and allergy list were reviewed and reconciled.     Past Medical History, Past Surgical History, Social History, Family History have been reviewed and are without significant changes except as mentioned.    Review of Systems:    Review of Systems    Physical Exam:  Physical Exam  Vitals and nursing note reviewed.   Constitutional:       Appearance: Normal appearance.   HENT:      Head: Normocephalic.   Cardiovascular:      Rate and Rhythm: Normal rate.   Pulmonary:      Effort: Pulmonary effort is normal.   Neurological:      Mental Status: She is alert.   Psychiatric:         Mood and Affect: Mood normal.         Behavior: Behavior normal.         Thought Content: Thought content normal.         Judgment: Judgment normal.        Vitals:    03/05/25 1425   BP: (!) 187/69   Pulse: 67   Resp: 16   Temp: 97.1 °F (36.2 °C)   SpO2: 99%     Vitals:    03/05/25 1425   PainSc: 0-No pain          ECOG: {findings; ecog performance status:88024            NEEDS ASSESSMENTS    VAD Assessment  The patient and I discussed planned intravenous chemotherapy as well as other IV treatments that are often needed throughout the course of treatment. These may include, but are not limited to blood transfusions, antibiotics, and IV hydration. The vasculature does not appear to be adequate for multiple peripheral IVs  throughout their treatment course. Discussed risks and benefits of VADs. The patient would like to pursue Port-A-Cath insertion prior to initiation of treatment.       Palliative Care  The patient and I discussed palliative care services. Palliative care is not the same as Hospice care. This is specialized medical care for people living with serious illness with the goal of improving quality of life for the patient and their family. [facility] has partnered with [facility] to offer our patients outpatient palliative care early along with their treatment to assist in coordination of care, symptom management, pain management, and medical decision making.  Oncology criteria for palliative care referral is not met at this time. The patient is not interested in a palliative care consultation.     Additional Referral needs  none      CHEMOTHERAPY EDUCATION    Booklets Given: Chemotherapy and You [x]  Eating Hints [x]    Sexuality/Fertility Books []      Chemotherapy/Biotherapy Education Sheets: (list all that apply)  nausea management, acid reflux management, diarrhea management, Cancer resourse contacts information, skin and mouth care, and vaccination information     Education Materials attached to AVS:  Chemotherapy; Preparing for Chemotherapy Adult; Precautions When Utilizing Cytotoxic Medicine                                                                                                                                                                Chemotherapy Regimen:   Treatment Plans       Name Type Plan Dates Plan Provider         Active    OP LUNG Pembrolizumab 200 mg / Pemetrexed / Carboplatin AUC=5 ONCOLOGY TREATMENT 2/16/2025 - Present Reinaldo Banks MD                      TOPICS EDUCATION PROVIDED COMMENTS   ANEMIA:  role of RBC, cause, s/s, ways to manage, role of transfusion [x]    THROMBOCYTOPENIA:  role of platelet, cause, s/s, ways to prevent bleeding, things to avoid, when to seek help [x]     NEUTROPENIA:  role of WBC, cause, infection precautions, s/s of infection, when to call MD [x]    NUTRITION & APPETITE CHANGES:  importance of maintaining healthy diet & weight, ways to manage to improve intake, dietary consult, exercise regimen [x]    DIARRHEA:  causes, s/s of dehydration, ways to manage, dietary changes, when to call MD [x]    CONSTIPATION:  causes, ways to manage, dietary changes, when to call MD [x]    NAUSEA & VOMITING:  cause, use of antiemetics, dietary changes, when to call MD [x]    MOUTH SORES:  causes, oral care, ways to manage [x]    ALOPECIA:  cause, ways to manage, resources [x]    INFERTILITY & SEXUALITY:  causes, fertility preservation options, sexuality changes, ways to manage, importance of birth control []    NERVOUS SYSTEM CHANGES:  causes, s/s, neuropathies, cognitive changes, ways to manage [x]    PAIN:  causes, ways to manage [x]    SKIN & NAIL CHANGES:  cause, s/s, ways to manage [x]    ORGAN TOXICITIES:  cause, s/s, need for diagnostic tests, labs, when to notify MD [x]    SURVIVORSHIP:  distress, distress assessment, secondary malignancies, early/late effects, follow-up, social issues, social support []    HOME CARE:  use of spill kits, storing of PO chemo, how to manage bodily fluids [x]    MISCELLANEOUS:  drug interactions, administration, vesicant, et [x]        Assessment and Plan:    Diagnoses and all orders for this visit:    1. Malignant neoplasm of lower lobe of left lung (Primary)  -     OLANZapine (zyPREXA) 5 MG tablet; Take 1 tablet by mouth Every Night. Take nightly x 4 starting night of chemotherapy.  Dispense: 4 tablet; Refill: 3  -     ondansetron (ZOFRAN) 8 MG tablet; Take 1 tablet by mouth 3 (Three) Times a Day As Needed for Nausea or Vomiting.  Dispense: 30 tablet; Refill: 3  -     folic acid (FOLVITE) 1 MG tablet; Take 1 tablet by mouth Daily. Start at least 7 days prior to chemotherapy until at least 3 weeks after all chemotherapy.  Dispense: 30  tablet; Refill: 4  -     dexAMETHasone (DECADRON) 4 MG tablet; Take 1 tablet twice daily starting the day before chemo, day of chemo, and day after chemo.  Take with food.  Dispense: 6 tablet; Refill: 3  -     Infusion Appointment Request; Future    2. Encounter for health education  Comments:  Chemotherapy/Immunotherapy    3. Elevated blood pressure reading    4. Encounter for long-term current use of high risk medication  -     Infusion Appointment Request; Future    Other orders  -     cyanocobalamin injection 1,000 mcg      The patient and I have reviewed their cancer diagnosis and scheduled treatment plan. Needs assessment was completed including genetics, psychosocial needs, barriers to care, VAD evaluation, advanced care planning, and palliative care services. Referrals have been ordered as appropriate based upon our evaluation and patient desires.     Chemotherapy teaching was also completed today as documented above. Adequate time was given to answer all questions to her satisfaction. Patient and family are aware of their care team members and contact information if they have questions or problems throughout the treatment course. The patient is adequately prepared to begin treatment as scheduled.     Reviewed with patient education regarding zofran, dexamethasone, folic acid, zyprexa and B12 prescriptions sent to pharmacy.       I advised the patient that they can take Tylenol or ibuprofen as needed for aches/pains related to cancer/treatment.  I also advised that they can use Senokot or MiraLAX as needed for constipation or Imodium as needed for diarrhea.       I reviewed with the patient the care team members. I also reviewed the option of the urgent care clinic through our oncology office for evaluation and management of symptoms related to treatment.    I spent 60 minutes caring for Lolis on this date of service. This time includes time spent by me in the following activities: preparing for the visit,  reviewing tests, obtaining and/or reviewing a separately obtained history, performing a medically appropriate examination and/or evaluation, counseling and educating the patient/family/caregiver, ordering medications, tests, or procedures, documenting information in the medical record, independently interpreting results and communicating that information with the patient/family/caregiver, and care coordination.

## 2025-03-04 NOTE — ANESTHESIA POSTPROCEDURE EVALUATION
Patient: Lolis Griffith    Procedure Summary       Date: 03/04/25 Room / Location: [facility] OR  / [facility]    Anesthesia Start: 1208 Anesthesia Stop: 1301    Procedure: INSERTION VENOUS ACCESS DEVICE: Placement of a port in the tissue under the skin at your upper chest with a tube attached to it that goes into your vein Diagnosis:       Adenocarcinoma, lung, left      (Adenocarcinoma, lung, left [C34.92])    Surgeons: Abhinav Martin MD Provider: Hemant Sutton MD    Anesthesia Type: general, MAC ASA Status: 3            Anesthesia Type: general, MAC    Vitals  Vitals Value Taken Time   /60 03/04/25 1332   Temp 36.2 °C (97.1 °F) 03/04/25 1301   Pulse 62 03/04/25 1346   Resp 16 03/04/25 1331   SpO2 94 % 03/04/25 1346   Vitals shown include unfiled device data.        Post Anesthesia Care and Evaluation    Patient location during evaluation: bedside  Patient participation: complete - patient participated  Level of consciousness: awake  Pain management: adequate    Airway patency: patent  PONV Status: none  Cardiovascular status: acceptable and stable  Respiratory status: acceptable  Hydration status: acceptable

## 2025-03-04 NOTE — ANESTHESIA PREPROCEDURE EVALUATION
Anesthesia Evaluation     Patient summary reviewed and Nursing notes reviewed   no history of anesthetic complications:   NPO Solid Status: > 8 hours  NPO Liquid Status: > 2 hours           Airway   Mallampati: II  TM distance: >3 FB  Neck ROM: full  No difficulty expected  Dental      Pulmonary - negative pulmonary ROS and normal exam    breath sounds clear to auscultation  (+) lung cancer,    ROS comment:  New LLL lung with hilar mets.  To start chemo.  No resp symptoms  Cardiovascular - negative cardio ROS and normal exam  Exercise tolerance: good (4-7 METS)    Rhythm: regular  Rate: normal    (+) hypertension, hyperlipidemia      Neuro/Psych- negative ROS  GI/Hepatic/Renal/Endo - negative ROS   (+) renal disease- CRI    Musculoskeletal     Abdominal    Substance History      OB/GYN          Other      history of cancer active    ROS/Med Hx Other: PAT Nursing Notes unavailable.               Anesthesia Plan    ASA 3     general and MAC   total IV anesthesia  (Patient understands anesthesia not responsible for dental damage.)  intravenous induction     Anesthetic plan, risks, benefits, and alternatives have been provided, discussed and informed consent has been obtained with: patient.    Plan discussed with CRNA.    CODE STATUS:

## 2025-03-05 ENCOUNTER — OFFICE VISIT (OUTPATIENT)
Dept: ONCOLOGY | Facility: HOSPITAL | Age: 80
End: 2025-03-05
Payer: MEDICARE

## 2025-03-05 ENCOUNTER — HOSPITAL ENCOUNTER (OUTPATIENT)
Dept: ONCOLOGY | Facility: HOSPITAL | Age: 80
Discharge: HOME | End: 2025-03-05
Payer: MEDICARE

## 2025-03-05 VITALS
OXYGEN SATURATION: 99 % | BODY MASS INDEX: 28.49 KG/M2 | SYSTOLIC BLOOD PRESSURE: 187 MMHG | HEIGHT: 66 IN | RESPIRATION RATE: 16 BRPM | HEART RATE: 67 BPM | TEMPERATURE: 97.1 F | DIASTOLIC BLOOD PRESSURE: 69 MMHG | WEIGHT: 177.25 LBS

## 2025-03-05 VITALS — SYSTOLIC BLOOD PRESSURE: 178 MMHG | DIASTOLIC BLOOD PRESSURE: 71 MMHG

## 2025-03-05 DIAGNOSIS — Z79.899 ENCOUNTER FOR LONG-TERM CURRENT USE OF HIGH RISK MEDICATION: ICD-10-CM

## 2025-03-05 DIAGNOSIS — Z71.9 ENCOUNTER FOR HEALTH EDUCATION: ICD-10-CM

## 2025-03-05 DIAGNOSIS — R03.0 ELEVATED BLOOD PRESSURE READING: ICD-10-CM

## 2025-03-05 DIAGNOSIS — C34.32 MALIGNANT NEOPLASM OF LOWER LOBE OF LEFT LUNG: Primary | ICD-10-CM

## 2025-03-05 PROBLEM — Z45.2 ENCOUNTER FOR ADJUSTMENT OR MANAGEMENT OF VASCULAR ACCESS DEVICE: Status: ACTIVE | Noted: 2025-03-05

## 2025-03-05 PROCEDURE — 96372 THER/PROPH/DIAG INJ SC/IM: CPT

## 2025-03-05 PROCEDURE — 25010000002 CYANOCOBALAMIN PER 1000 MCG: Performed by: PHYSICIAN ASSISTANT

## 2025-03-05 RX ORDER — DEXAMETHASONE 4 MG/1
TABLET ORAL
Qty: 6 TABLET | Refills: 3 | Status: SHIPPED | OUTPATIENT
Start: 2025-03-05

## 2025-03-05 RX ORDER — ONDANSETRON 8 MG/1
8 TABLET, FILM COATED ORAL 3 TIMES DAILY PRN
Qty: 30 TABLET | Refills: 3 | Status: SHIPPED | OUTPATIENT
Start: 2025-03-05

## 2025-03-05 RX ORDER — FOLIC ACID 1 MG/1
1 TABLET ORAL DAILY
Qty: 30 TABLET | Refills: 4 | Status: SHIPPED | OUTPATIENT
Start: 2025-03-05

## 2025-03-05 RX ORDER — OLANZAPINE 5 MG/1
5 TABLET ORAL NIGHTLY
Qty: 4 TABLET | Refills: 3 | Status: SHIPPED | OUTPATIENT
Start: 2025-03-05

## 2025-03-05 RX ORDER — CYANOCOBALAMIN 1000 UG/ML
1000 INJECTION, SOLUTION INTRAMUSCULAR; SUBCUTANEOUS ONCE
Status: COMPLETED | OUTPATIENT
Start: 2025-03-05 | End: 2025-03-05

## 2025-03-05 RX ORDER — CYANOCOBALAMIN 1000 UG/ML
1000 INJECTION, SOLUTION INTRAMUSCULAR; SUBCUTANEOUS ONCE
Status: CANCELLED | OUTPATIENT
Start: 2025-03-05 | End: 2025-03-05

## 2025-03-05 RX ADMIN — CYANOCOBALAMIN 1000 MCG: 1000 INJECTION, SOLUTION INTRAMUSCULAR at 15:54

## 2025-03-06 LAB
MYCOBACTERIUM SPEC CULT: NORMAL
NIGHT BLUE STAIN TISS: NORMAL
NIGHT BLUE STAIN TISS: NORMAL

## 2025-03-07 ENCOUNTER — TRANSCRIBE ORDERS (OUTPATIENT)
Dept: ADMINISTRATIVE | Facility: HOSPITAL | Age: 80
End: 2025-03-07
Payer: MEDICARE

## 2025-03-07 DIAGNOSIS — Z12.31 VISIT FOR SCREENING MAMMOGRAM: Primary | ICD-10-CM

## 2025-03-08 NOTE — PROGRESS NOTES
"Chief Complaint  Lung Cancer    Subjective        Lolis Griffith presents to [facility]  History of Present Illness  Ms. Griffith is a pleasant 79-year-old lady with a recently diagnosed adenocarcinoma.  This nodule was discovered on monitoring for a benign pancreas lesion.  She has undergone bronchoscopy as well as brain MRI and PET CT scan.  She is a never smoker.  She does have some shortness of breath.  Objective   Vital Signs:  /77 (BP Location: Right arm, Patient Position: Sitting, Cuff Size: Adult)   Pulse 64   Resp 16   Wt 82.7 kg (182 lb 4.8 oz)   SpO2 97%   BMI 27.72 kg/m²   Estimated body mass index is 27.72 kg/m² as calculated from the following:    Height as of 2/10/25: 172.7 cm (68\").    Weight as of this encounter: 82.7 kg (182 lb 4.8 oz).          Physical Exam  Vitals and nursing note reviewed.   Constitutional:       Appearance: She is well-developed.   HENT:      Head: Normocephalic and atraumatic.      Nose: Nose normal.   Eyes:      Conjunctiva/sclera: Conjunctivae normal.   Cardiovascular:      Rate and Rhythm: Normal rate.   Pulmonary:      Effort: Pulmonary effort is normal.   Abdominal:      Palpations: Abdomen is soft.   Musculoskeletal:      Cervical back: Neck supple.   Skin:     General: Skin is warm and dry.   Neurological:      Mental Status: She is alert and oriented to person, place, and time.   Psychiatric:         Behavior: Behavior normal.         Thought Content: Thought content normal.         Judgment: Judgment normal.        Result Review :          I have fully reviewed the CT of the chest performed on 1/30/2025 and agree with radiology report which demonstrates a 1.6 cm left lower lobe nodule concerning for bronchogenic malignancy.  Infectious appearing tree-in-bud nodularity in the left lower lobe.  Mediastinal lymphadenopathy.  4.1 cm renal lesion.    I have independently reviewed the PET CT scan performed on 2/12/2025 and " agree with radiology report which demonstrates hypermetabolic 1.6 cm left lower lobe nodule with hypermetabolic mediastinal and hilar lymphadenopathy without evidence of distant metastatic disease.    Brain MRI without evidence of metastatic disease.    Pulmonary function studies demonstrate an FEV1 of 2.49 or 109% predicted and a DLCO of 22 or 100%    Pathology: Left lower lobe nodule adenocarcinoma,     Assessment and Plan   Diagnoses and all orders for this visit:    1. NSCLC of lower lobe (Primary)  -     Stress Test With Myocardial Perfusion - One Day; Future  -     Ambulatory Referral to Oncology    2. Shortness of breath  -     Stress Test With Myocardial Perfusion - One Day; Future    Other orders  -     lisinopril (PRINIVIL,ZESTRIL) 5 MG tablet; Take 1 tablet by mouth Daily.  Dispense: 30 tablet; Refill: 3    Ms. 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  She does have some mediastinal and hilar lymphadenopathy that could be benign given the Pseudomonas seen on her lung biopsy versus malignancy.  She is going to be seen and evaluated by Dr. Banks.  She would be a candidate for curative intent resection based on her pulmonary function studies.  We will plan a stress echo.  Will also plan referral to oncology to discuss neoadjuvant therapy.    She is on a diuretic for blood pressure control and this is not a first-line treatment for blood pressure.  I would recommend starting lisinopril which was prescribed today.  She will plan to stop her diuretic.         Follow Up   No follow-ups on file.  Patient was given instructions and counseling regarding her condition or for health maintenance advice. Please see specific information pulled into the AVS if appropriate.

## 2025-03-13 ENCOUNTER — HOSPITAL ENCOUNTER (OUTPATIENT)
Dept: ONCOLOGY | Facility: HOSPITAL | Age: 80
Discharge: HOME | End: 2025-03-13
Payer: MEDICARE

## 2025-03-13 ENCOUNTER — DOCUMENTATION (OUTPATIENT)
Dept: ONCOLOGY | Facility: HOSPITAL | Age: 80
End: 2025-03-13
Payer: MEDICARE

## 2025-03-13 ENCOUNTER — OFFICE VISIT (OUTPATIENT)
Dept: ONCOLOGY | Facility: HOSPITAL | Age: 80
End: 2025-03-13
Payer: MEDICARE

## 2025-03-13 VITALS
HEART RATE: 63 BPM | WEIGHT: 181.66 LBS | DIASTOLIC BLOOD PRESSURE: 72 MMHG | RESPIRATION RATE: 18 BRPM | SYSTOLIC BLOOD PRESSURE: 153 MMHG | OXYGEN SATURATION: 96 % | TEMPERATURE: 98.9 F | BODY MASS INDEX: 30.27 KG/M2 | HEIGHT: 65 IN

## 2025-03-13 VITALS
SYSTOLIC BLOOD PRESSURE: 153 MMHG | HEART RATE: 63 BPM | RESPIRATION RATE: 18 BRPM | DIASTOLIC BLOOD PRESSURE: 72 MMHG | OXYGEN SATURATION: 96 % | WEIGHT: 181.66 LBS | TEMPERATURE: 98.9 F | HEIGHT: 65 IN | BODY MASS INDEX: 30.27 KG/M2

## 2025-03-13 DIAGNOSIS — Z79.899 ENCOUNTER FOR LONG-TERM CURRENT USE OF HIGH RISK MEDICATION: ICD-10-CM

## 2025-03-13 DIAGNOSIS — Z45.2 ENCOUNTER FOR ADJUSTMENT OR MANAGEMENT OF VASCULAR ACCESS DEVICE: ICD-10-CM

## 2025-03-13 DIAGNOSIS — C34.32 MALIGNANT NEOPLASM OF LOWER LOBE OF LEFT LUNG: Primary | ICD-10-CM

## 2025-03-13 DIAGNOSIS — J15.1 PNEUMONIA DUE TO PSEUDOMONAS SPECIES, UNSPECIFIED LATERALITY, UNSPECIFIED PART OF LUNG: ICD-10-CM

## 2025-03-13 LAB
ALBUMIN SERPL-MCNC: 3.8 G/DL (ref 3.5–5.2)
ALBUMIN/GLOB SERPL: 1.4 G/DL
ALP SERPL-CCNC: 42 U/L (ref 39–117)
ALT SERPL W P-5'-P-CCNC: 11 U/L (ref 1–33)
ANION GAP SERPL CALCULATED.3IONS-SCNC: 10.8 MMOL/L (ref 5–15)
AST SERPL-CCNC: 20 U/L (ref 1–32)
BASOPHILS # BLD AUTO: 0.01 10*3/MM3 (ref 0–0.2)
BASOPHILS NFR BLD AUTO: 0.1 % (ref 0–1.5)
BILIRUB SERPL-MCNC: 0.4 MG/DL (ref 0–1.2)
BUN SERPL-MCNC: 21 MG/DL (ref 8–23)
BUN/CREAT SERPL: 31.8 (ref 7–25)
CALCIUM SPEC-SCNC: 8.9 MG/DL (ref 8.6–10.5)
CHLORIDE SERPL-SCNC: 98 MMOL/L (ref 98–107)
CO2 SERPL-SCNC: 23.2 MMOL/L (ref 22–29)
CREAT SERPL-MCNC: 0.66 MG/DL (ref 0.57–1)
DEPRECATED RDW RBC AUTO: 45.4 FL (ref 37–54)
EGFRCR SERPLBLD CKD-EPI 2021: 90.5 ML/MIN/1.73
EOSINOPHIL # BLD AUTO: 0.01 10*3/MM3 (ref 0–0.4)
EOSINOPHIL NFR BLD AUTO: 0.1 % (ref 0.3–6.2)
ERYTHROCYTE [DISTWIDTH] IN BLOOD BY AUTOMATED COUNT: 13.5 % (ref 12.3–15.4)
GLOBULIN UR ELPH-MCNC: 2.8 GM/DL
GLUCOSE SERPL-MCNC: 102 MG/DL (ref 65–99)
HCT VFR BLD AUTO: 40.3 % (ref 34–46.6)
HGB BLD-MCNC: 13.7 G/DL (ref 12–15.9)
IMM GRANULOCYTES # BLD AUTO: 0.04 10*3/MM3 (ref 0–0.05)
IMM GRANULOCYTES NFR BLD AUTO: 0.4 % (ref 0–0.5)
LYMPHOCYTES # BLD AUTO: 1.03 10*3/MM3 (ref 0.7–3.1)
LYMPHOCYTES NFR BLD AUTO: 10.9 % (ref 19.6–45.3)
MCH RBC QN AUTO: 30.9 PG (ref 26.6–33)
MCHC RBC AUTO-ENTMCNC: 34 G/DL (ref 31.5–35.7)
MCV RBC AUTO: 91 FL (ref 79–97)
MONOCYTES # BLD AUTO: 0.5 10*3/MM3 (ref 0.1–0.9)
MONOCYTES NFR BLD AUTO: 5.3 % (ref 5–12)
MYCOBACTERIUM SPEC CULT: NORMAL
NEUTROPHILS NFR BLD AUTO: 7.88 10*3/MM3 (ref 1.7–7)
NEUTROPHILS NFR BLD AUTO: 83.2 % (ref 42.7–76)
NIGHT BLUE STAIN TISS: NORMAL
NIGHT BLUE STAIN TISS: NORMAL
NRBC BLD AUTO-RTO: 0 /100 WBC (ref 0–0.2)
PLATELET # BLD AUTO: 251 10*3/MM3 (ref 140–450)
PMV BLD AUTO: 9.8 FL (ref 6–12)
POTASSIUM SERPL-SCNC: 3.6 MMOL/L (ref 3.5–5.2)
PROT SERPL-MCNC: 6.6 G/DL (ref 6–8.5)
RBC # BLD AUTO: 4.43 10*6/MM3 (ref 3.77–5.28)
SODIUM SERPL-SCNC: 132 MMOL/L (ref 136–145)
T4 FREE SERPL-MCNC: 1.11 NG/DL (ref 0.92–1.68)
TSH SERPL DL<=0.05 MIU/L-ACNC: 0.48 UIU/ML (ref 0.27–4.2)
WBC NRBC COR # BLD AUTO: 9.47 10*3/MM3 (ref 3.4–10.8)

## 2025-03-13 PROCEDURE — 25810000003 SODIUM CHLORIDE 0.9 % SOLUTION 250 ML FLEX CONT: Performed by: INTERNAL MEDICINE

## 2025-03-13 PROCEDURE — 85025 COMPLETE CBC W/AUTO DIFF WBC: CPT | Performed by: INTERNAL MEDICINE

## 2025-03-13 PROCEDURE — 96413 CHEMO IV INFUSION 1 HR: CPT

## 2025-03-13 PROCEDURE — 96375 TX/PRO/DX INJ NEW DRUG ADDON: CPT

## 2025-03-13 PROCEDURE — 25010000002 PEMETREXED PER 10 MG: Performed by: INTERNAL MEDICINE

## 2025-03-13 PROCEDURE — 25010000002 PEMBROLIZUMAB 100 MG/4ML SOLUTION 4 ML VIAL: Performed by: INTERNAL MEDICINE

## 2025-03-13 PROCEDURE — 84439 ASSAY OF FREE THYROXINE: CPT | Performed by: INTERNAL MEDICINE

## 2025-03-13 PROCEDURE — 25010000002 PEMETREXED 100 MG RECONSTITUTED SOLUTION 1 EACH VIAL: Performed by: INTERNAL MEDICINE

## 2025-03-13 PROCEDURE — 96417 CHEMO IV INFUS EACH ADDL SEQ: CPT

## 2025-03-13 PROCEDURE — 25010000002 PALONOSETRON PER 25 MCG: Performed by: INTERNAL MEDICINE

## 2025-03-13 PROCEDURE — 25010000002 FOSAPREPITANT PER 1 MG: Performed by: INTERNAL MEDICINE

## 2025-03-13 PROCEDURE — 84443 ASSAY THYROID STIM HORMONE: CPT | Performed by: INTERNAL MEDICINE

## 2025-03-13 PROCEDURE — 96411 CHEMO IV PUSH ADDL DRUG: CPT

## 2025-03-13 PROCEDURE — 25810000003 SODIUM CHLORIDE 0.9 % SOLUTION: Performed by: INTERNAL MEDICINE

## 2025-03-13 PROCEDURE — 25010000002 CARBOPLATIN PER 50 MG: Performed by: INTERNAL MEDICINE

## 2025-03-13 PROCEDURE — 80053 COMPREHEN METABOLIC PANEL: CPT | Performed by: INTERNAL MEDICINE

## 2025-03-13 PROCEDURE — 96367 TX/PROPH/DG ADDL SEQ IV INF: CPT

## 2025-03-13 PROCEDURE — 25010000002 HEPARIN LOCK FLUSH PER 10 UNITS: Performed by: INTERNAL MEDICINE

## 2025-03-13 RX ORDER — SODIUM CHLORIDE 9 MG/ML
20 INJECTION, SOLUTION INTRAVENOUS ONCE
Status: COMPLETED | OUTPATIENT
Start: 2025-03-13 | End: 2025-03-13

## 2025-03-13 RX ORDER — PALONOSETRON 0.05 MG/ML
0.25 INJECTION, SOLUTION INTRAVENOUS ONCE
Status: COMPLETED | OUTPATIENT
Start: 2025-03-13 | End: 2025-03-13

## 2025-03-13 RX ORDER — HEPARIN SODIUM (PORCINE) LOCK FLUSH IV SOLN 100 UNIT/ML 100 UNIT/ML
500 SOLUTION INTRAVENOUS AS NEEDED
Status: DISCONTINUED | OUTPATIENT
Start: 2025-03-13 | End: 2025-03-14 | Stop reason: HOSPADM

## 2025-03-13 RX ORDER — PALONOSETRON 0.05 MG/ML
0.25 INJECTION, SOLUTION INTRAVENOUS ONCE
Status: CANCELLED | OUTPATIENT
Start: 2025-03-13

## 2025-03-13 RX ORDER — DIPHENHYDRAMINE HYDROCHLORIDE 50 MG/ML
50 INJECTION INTRAMUSCULAR; INTRAVENOUS AS NEEDED
Status: CANCELLED | OUTPATIENT
Start: 2025-03-13

## 2025-03-13 RX ORDER — HYDROCORTISONE SODIUM SUCCINATE 100 MG/2ML
100 INJECTION INTRAMUSCULAR; INTRAVENOUS AS NEEDED
Status: CANCELLED | OUTPATIENT
Start: 2025-03-13

## 2025-03-13 RX ORDER — SODIUM CHLORIDE 0.9 % (FLUSH) 0.9 %
20 SYRINGE (ML) INJECTION AS NEEDED
Status: DISCONTINUED | OUTPATIENT
Start: 2025-03-13 | End: 2025-03-14 | Stop reason: HOSPADM

## 2025-03-13 RX ORDER — SODIUM CHLORIDE 0.9 % (FLUSH) 0.9 %
20 SYRINGE (ML) INJECTION AS NEEDED
OUTPATIENT
Start: 2025-03-13

## 2025-03-13 RX ORDER — NYSTATIN 100000 [USP'U]/ML
SUSPENSION ORAL
COMMUNITY
Start: 2025-03-07 | End: 2025-03-17 | Stop reason: SDUPTHER

## 2025-03-13 RX ORDER — HEPARIN SODIUM (PORCINE) LOCK FLUSH IV SOLN 100 UNIT/ML 100 UNIT/ML
500 SOLUTION INTRAVENOUS AS NEEDED
OUTPATIENT
Start: 2025-03-13

## 2025-03-13 RX ORDER — SODIUM CHLORIDE 9 MG/ML
20 INJECTION, SOLUTION INTRAVENOUS ONCE
Status: CANCELLED | OUTPATIENT
Start: 2025-03-13

## 2025-03-13 RX ORDER — FAMOTIDINE 10 MG/ML
20 INJECTION, SOLUTION INTRAVENOUS AS NEEDED
Status: CANCELLED | OUTPATIENT
Start: 2025-03-13

## 2025-03-13 RX ADMIN — PEMETREXED DISODIUM 900 MG: 500 INJECTION, POWDER, LYOPHILIZED, FOR SOLUTION INTRAVENOUS at 12:17

## 2025-03-13 RX ADMIN — SODIUM CHLORIDE 20 ML/HR: 9 INJECTION, SOLUTION INTRAVENOUS at 10:50

## 2025-03-13 RX ADMIN — Medication 20 ML: at 13:10

## 2025-03-13 RX ADMIN — HEPARIN 500 UNITS: 100 SYRINGE at 13:10

## 2025-03-13 RX ADMIN — PALONOSETRON HYDROCHLORIDE 0.25 MG: 0.25 INJECTION, SOLUTION INTRAVENOUS at 10:51

## 2025-03-13 RX ADMIN — FOSAPREPITANT 150 MG: 150 INJECTION, POWDER, LYOPHILIZED, FOR SOLUTION INTRAVENOUS at 10:54

## 2025-03-13 RX ADMIN — SODIUM CHLORIDE 200 MG: 9 INJECTION, SOLUTION INTRAVENOUS at 11:36

## 2025-03-13 RX ADMIN — CARBOPLATIN 590 MG: 600 INJECTION, SOLUTION INTRAVENOUS at 12:33

## 2025-03-13 NOTE — PROGRESS NOTES
Reason for Referral: Rounding with new patients at [facility] with high distress screening score    Diagnosis: Lung cancer    Distress Score: 8 with no concerns    Location of Distress Screening:  [facility]    PHQ Score: 0     Current Treatment Plan: Neoadjuvant chemoimmunotherapy    Previous Cancer TX: Patient stated this is her first cancer diagnosis.    Mental Status: Patient is very pleasant and easy to engage.  Patient was supported by her spouse.  Patient appeared to be in a good mood with matching affect. Patient was oriented x 4. Patient seemed to have her cognitive and memory intact.  Patient stated she has no history of being treated for mental health concerns and denied any thoughts of suicide or homicide.  OSW provided emotional support through active listening, empathizing, normalizing, and validating patient's thoughts and feelings.    Mental Health Treatment: Patient stated she has no history of being treated for mental health concerns and denied any thoughts of suicide or homicide.     Substance Use/Tobacco Use: Patient stated she has no tobacco use, alcohol use or illegal substance use.    Spirituality: Patient stated she identifies as Yarsanism.     Hobbies: Patient stated she enjoys quilting and gardening.    Support systems: Patient stated her primary support system includes her , daughter and [employer] group.    Residential status/Who lives in the home: Patient stated she lives in the home with her  of 57 years.    Transportation: Patient's  stated he would provide the transportation to her treatment.    Financial Concerns: Patient stated she has no financial concerns or concerns regarding her health insurance coverage.    Home Care Needs: Patient stated she has no in-home care needs at this time.    Advanced Directive/Living Will: Patient has an advanced directive on file.    Insurance: [employer] MED[employer]ARE REPLACEMENT and  [employer] [facility]     Resources/Referrals: OSW  provided patient with her business card and an overview of oncology social work services.  Patient stated she does not have any barriers to care or unmet needs to address today.

## 2025-03-13 NOTE — PROGRESS NOTES
Chief Complaint  FOLLOW UP 1 -    Provider, No Known  Mary Cruz, ROBE    Subjective          Lolis LAMBERT Nacho presents to [facility] for lung adenocarcinoma    Patient is a very pleasant 79-year-old female with past medical history of hypertension, dyslipidemia, CKD who presents for lung adenocarcinoma.  Patient has CT chest done for lung nodule on 1/30/2025.  Shows stable irregular 1.6 cm left lower lobe nodule.  This showed stable mild mediastinal adenopathy which could be reactive or metastatic.  Ion bronchoscopy done same day showed left lower lobe biopsy positive for adenocarcinoma lipidic pattern.  EGFR, Ross, ALK all negative..  L1 positive at 60%.  BAL culture positive for Pseudomonas.  She was started on IV cefepime for 10 days via PICC.  She has completed this.  MRI brain on 2/6/25 was negative for intracranial disease.  PET scan from 2/12/2025 showed a 1.6 cm left lower lobe nodule that was FDG avid.  FDG avid mediastinal and left hilar lymphadenopathy also shown.  Index subcarinal lymph nodes measure 2.5 cm with SUV max 6.6.  Also had a left infrahilar lymph node positive.      Interval History  Patient presents for follow-up today.  She is due to start chemotherapy with carboplatin pemetrexed and pembrolizumab.  She has had port placed.  Is functioning well.  No new symptoms since last visit.  Ready to start.  Breathing stable.  No fevers or chills.  She has antiemetics ready.       Oncology/Hematology History Overview Note   1/30/25: CT Chest done for lung nodule: Stable irregular 1.6 cm left lower lobe nodule, potentially neoplastic. Per Fleischner guidelines this could be assessed by short-term follow-up 3-month chest CT, PET/CT and/or biopsy. Recommend pulmonary consultation for management. Infectious/inflammatory lower lobe predominant tree-in-bud nodularity slightly waxing and waning since previous comparison. Findings could reflect bronchiolitis,  microaspiration or atypical infection. Recommend attention on follow-up imaging.   Stable mild mediastinal adenopathy which could be reactive or metastatic pending etiology of the left lower lobe nodule. Exophytic indeterminate right upper pole renal lesion measuring 4.1 cm. At this size nonemergent renal ultrasound is recommended to assess if lesion is solid or cystic. Aortic and severe coronary atherosclerotic disease.    1/30/25: ION bronchoscopy with biopsy: BAL culture positive for >100K CFU/mL of Pseudomonas. Left lower lobe biopsy positive for adenocarcinoma, lepidic pattern, lung primary. EGFR, ROS, ALK negative. PDL1 positive at 60%.     Started on IV antibiotic (Cefepime) x 10 days for lung pseudomonas per pulmonary team.     2/6/25: MRI brain negative for intracranial disease    2/12/25: NM PET: A 1.6 cm left lower lobe nodule is FDG avid with SUV max 7.4. There is mild bibasilar atelectasis. There is FDG-avid mediastinal and left hilar lymphadenopathy. An index subcarinal lymph node on image 85 measures 2.5 x 1.3 cm with SUV max 6.6. An index left infrahilar lymph node on image 94 has SUV max 9.4, with note that size measurement is difficult on this unenhanced exam. No metastatic disease outside of chest seen.     3/13/25: C1D1 neoadjuvant Carboplatin/Pemetrexed/Pembrolizumab     Malignant neoplasm of lower lobe of left lung   2/24/2025 Initial Diagnosis    Malignant neoplasm of lower lobe of left lung     2/24/2025 Cancer Staged    Staging form: Lung, AJCC V9  - Clinical: Stage IIB (cT1b, cN2a, cM0) - Signed by Reinaldo Banks MD on 2/24/2025     3/13/2025 -  Chemotherapy    OP LUNG Pembrolizumab 200 mg / Pemetrexed / Carboplatin AUC=5           Review of Systems   Constitutional:  Negative for appetite change, diaphoresis, fatigue, fever, unexpected weight gain and unexpected weight loss.   HENT:  Negative for hearing loss, mouth sores, sore throat, swollen glands, trouble swallowing and voice  change.    Eyes:  Negative for blurred vision.   Respiratory:  Negative for cough, shortness of breath and wheezing.    Cardiovascular:  Negative for chest pain and palpitations.   Gastrointestinal:  Negative for abdominal pain, blood in stool, constipation, diarrhea, nausea and vomiting.   Endocrine: Negative for cold intolerance and heat intolerance.   Genitourinary:  Negative for difficulty urinating, dysuria, frequency, hematuria and urinary incontinence.   Musculoskeletal:  Negative for arthralgias, back pain and myalgias.   Skin:  Negative for rash, skin lesions and wound.   Neurological:  Negative for dizziness, seizures, weakness, numbness and headache.   Hematological:  Does not bruise/bleed easily.   Psychiatric/Behavioral:  Negative for depressed mood. The patient is not nervous/anxious.    All other systems reviewed and are negative.    Current Outpatient Medications on File Prior to Visit   Medication Sig Dispense Refill    Breztri Aerosphere 160-9-4.8 MCG/ACT aerosol inhaler Inhale 2 puffs 2 (Two) Times a Day.      cetirizine (zyrTEC) 10 MG tablet Take 1 tablet by mouth Every Night.      Cholecalciferol 125 MCG (5000 UT) tablet Take 1 tablet by mouth Daily.      dexAMETHasone (DECADRON) 4 MG tablet Take 1 tablet twice daily starting the day before chemo, day of chemo, and day after chemo.  Take with food. 6 tablet 3    Docusate Sodium 100 MG capsule Take 1 tablet by mouth Every Night.      estradiol (ESTRACE) 0.1 MG/GM vaginal cream USE AS DIRECTED 3 TIMES A WEEK      ezetimibe (ZETIA) 10 MG tablet Take 1 tablet by mouth Every Night.      fenofibrate (TRICOR) 145 MG tablet Take 1 tablet by mouth Daily.      ferrous sulfate 324 (65 Fe) MG tablet delayed-release EC tablet Take 1 tablet by mouth Every Night.      folic acid (FOLVITE) 1 MG tablet Take 1 tablet by mouth Daily. Start at least 7 days prior to chemotherapy until at least 3 weeks after all chemotherapy. 30 tablet 4    HYDROcodone-acetaminophen  "(NORCO) 5-325 MG per tablet Take 1 tablet by mouth Every 6 (Six) Hours As Needed for Moderate Pain or Severe Pain. 5 tablet 0    lisinopril (PRINIVIL,ZESTRIL) 5 MG tablet Take 1 tablet by mouth Daily. 30 tablet 3    loratadine (CLARITIN) 10 MG tablet Take 1 tablet by mouth Daily.      magnesium gluconate (MAGONATE) 500 MG tablet Take 1 tablet by mouth Every Night.      montelukast (SINGULAIR) 10 MG tablet Take 1 tablet by mouth Every Night.      OLANZapine (zyPREXA) 5 MG tablet Take 1 tablet by mouth Every Night. Take nightly x 4 starting night of chemotherapy. 4 tablet 3    omeprazole (priLOSEC) 40 MG capsule Take 1 capsule by mouth Daily.      ondansetron (ZOFRAN) 8 MG tablet Take 1 tablet by mouth 3 (Three) Times a Day As Needed for Nausea or Vomiting. 30 tablet 3    simvastatin (ZOCOR) 20 MG tablet Take 1 tablet by mouth Daily.      Unable to find Take 1 each by mouth Daily. PROBIOTIC PREBIOTICS AND CRANBERRY       No current facility-administered medications on file prior to visit.       No Known Allergies  Past Medical History:   Diagnosis Date    GERD (gastroesophageal reflux disease)     History of sinus problem     Hypertension     Malignant neoplasm of lower lobe of left lung     Other hyperlipidemia     Pancreas disorder     states scanned yearly ot monitor \"fatty pancreas\"     Past Surgical History:   Procedure Laterality Date    BLADDER REPAIR      BRONCHOSCOPY WITH ION ROBOTIC ASSIST Left 01/30/2025    Procedure: BRONCHOSCOPY WITH ION ROBOT: BAL, REBUS, NEEDLE ASPIRATE, BIOPSIES insertion of lighted robot navigated instrument to view inside the lung;  Surgeon: Stevie Carson DO;  Location: [facility];  Service: Robotics - Pulmonary;  Laterality: Left;    LAPAROSCOPIC CHOLECYSTECTOMY      PARATHYROIDECTOMY      RECTAL PROLAPSE REPAIR  2021    TOTAL ABDOMINAL HYSTERECTOMY WITH SALPINGO OOPHORECTOMY      TOTAL HIP ARTHROPLASTY Left     VENOUS ACCESS DEVICE (PORT) INSERTION N/A 3/4/2025    " Procedure: INSERTION VENOUS ACCESS DEVICE: Placement of a port in the tissue under the skin at your upper chest with a tube attached to it that goes into your vein;  Surgeon: Abhinav Martin MD;  Location: [facility];  Service: General;  Laterality: N/A;     Social History     Socioeconomic History    Marital status:    Tobacco Use    Smoking status: Never     Passive exposure: Never    Smokeless tobacco: Never   Vaping Use    Vaping status: Never Used   Substance and Sexual Activity    Alcohol use: Not Currently    Drug use: Never    Sexual activity: Defer     Family History   Problem Relation Age of Onset    Cancer Brother     [place] Hyperthermia Neg Hx        Objective   Physical Exam  Well appearing patient in no acute distress on RA  Anicteric sclerae, no rash on exposed skin  Respirations non-labored  Awake, alert, and oriented x 4. Speech intact. No gross neurologic deficit  Appropriate mood and affect    There were no vitals filed for this visit.              PHQ-9 Total Score:                      Result Review :   The following data was reviewed by: Reinaldo Banks MD on 03/13/25:  Lab Results   Component Value Date    HGB 14.1 02/27/2025    HCT 42.4 02/27/2025    MCV 94.0 02/27/2025     02/27/2025    WBC 4.93 02/27/2025    NEUTROABS 2.66 02/27/2025    LYMPHSABS 1.54 02/27/2025    MONOSABS 0.51 02/27/2025    EOSABS 0.16 02/27/2025    BASOSABS 0.05 02/27/2025     Lab Results   Component Value Date    GLUCOSE 86 02/27/2025    BUN 18 02/27/2025    CREATININE 0.85 02/27/2025     02/27/2025    K 4.1 02/27/2025    CL 99 02/27/2025    CO2 28.0 02/27/2025    CALCIUM 9.8 02/27/2025    PROTEINTOT 6.7 02/27/2025    ALBUMIN 4.1 02/27/2025    BILITOT 0.5 02/27/2025    ALKPHOS 45 02/27/2025    AST 31 02/27/2025    ALT 20 02/27/2025     Lab Results   Component Value Date    PHOS 3.1 08/06/2024    FREET4 1.21 09/03/2024    TSH 1.750 02/27/2025     Labs personally reviewed    Pathology report  personally reviewed    PET personally reviewed and per my independent read with LLL hypermetabolic lesion with metastatic subcarcinal and left hilar adenopathy.          XR Chest 1 View  Result Date: 3/4/2025  Impression: Port catheter tip is within the superior vena cava. Electronically Signed: Osvaldo Zapata MD  3/4/2025 1:26 PM EST  Workstation ID: [id number]    NM PET/CT Skull Base to Mid Thigh  Result Date: 2/12/2025  Impression: 1.1.6 cm left lower lobe nodule is FDG avid, compatible with primary lung malignancy. 2.FDG-avid mediastinal and left hilar lymphadenopathy, likely metastatic. 3.No evidence of metastasis outside of the chest. Electronically Signed: Shad Mckenna MD  2/12/2025 3:39 PM EST  Workstation ID: [id number]          Assessment and Plan    Diagnoses and all orders for this visit:    1. Malignant neoplasm of lower lobe of left lung (Primary)    Other orders  -     Cancel: sodium chloride 0.9 % infusion  -     Cancel: Pembrolizumab (KEYTRUDA) 200 mg in sodium chloride 0.9 % 58 mL chemo IVPB  -     Cancel: palonosetron (ALOXI) injection 0.25 mg  -     Cancel: fosaprepitant (EMEND) 150 mg in sodium chloride 0.9 % 100 mL IVPB  -     Cancel: PEMEtrexed (ALIMTA) 990 mg in sodium chloride 0.9 % 139.6 mL chemo IVPB  -     Cancel: CARBOplatin (PARAPLATIN) 490 mg in sodium chloride 0.9 % 299 mL chemo IVPB  -     Cancel: Hydrocortisone Sod Suc (PF) (Solu-CORTEF) injection 100 mg  -     Cancel: diphenhydrAMINE (BENADRYL) injection 50 mg  -     Cancel: famotidine (PEPCID) injection 20 mg        Left lung adenocarcinoma  Clinical T1cN2a based off PET.  Lymph node biopsies have not been performed.  Patient did have Pseudomonas infection but these lymph nodes appear to be metastatic.  Prefer to treat patient with neoadjuvant chemoimmunotherapy followed by surgery.  Have personally discussed patient with thoracic surgeon Dr. Dozier, who is in agreement.  Plan will be carboplatin, pemetrexed combined with  pembrolizumab given every 3 weeks for 4 cycles  Risk, benefit, side effect profile of these 3 medications were discussed in detail including immune related side effects.  As needed and scheduled antiemetics to be provided.  Dexamethasone premed to be provided.  B12 and folic acid while pemetrexed also will be provided. Port has been placed    C1D1 Carbo/Pemetrexed/Pembrolizumab neoadjuvant 3/13/25.     RTC for C2.    Repeat PET after C4 planned.       This is an acute or chronic illness that poses a threat to life or bodily function. The above treatment plan involves a high risk of complications and/or mortality of patient management.        I spent 30 minutes caring for Lolis on this date of service. This time includes time spent by me in the following activities:preparing for the visit, reviewing tests, obtaining and/or reviewing a separately obtained history, performing a medically appropriate examination and/or evaluation , counseling and educating the patient/family/caregiver, ordering medications, tests, or procedures, referring and communicating with other health care professionals , documenting information in the medical record, independently interpreting results and communicating that information with the patient/family/caregiver, and care coordination    Patient Follow Up: C2 chemo  Patient was given instructions and counseling regarding her condition or for health maintenance advice. Please see specific information pulled into the AVS if appropriate.

## 2025-03-14 ENCOUNTER — TELEPHONE (OUTPATIENT)
Dept: SURGERY | Facility: CLINIC | Age: 80
End: 2025-03-14
Payer: MEDICARE

## 2025-03-14 NOTE — TELEPHONE ENCOUNTER
PT CX'D HER APPT WITH DR WATTS FOR 3/18/25 FOR PORT PLACEMENT, CALLED TO MAKE SURE EVERYTHING WAS OK AND IF PT NEEDED TO RS HER APPT, NO ANSWER, NO MACHINE.

## 2025-03-15 DIAGNOSIS — C34.32 MALIGNANT NEOPLASM OF LOWER LOBE OF LEFT LUNG: ICD-10-CM

## 2025-03-17 ENCOUNTER — TELEPHONE (OUTPATIENT)
Dept: SURGERY | Facility: CLINIC | Age: 80
End: 2025-03-17
Payer: MEDICARE

## 2025-03-17 ENCOUNTER — TELEPHONE (OUTPATIENT)
Dept: ONCOLOGY | Facility: HOSPITAL | Age: 80
End: 2025-03-17
Payer: MEDICARE

## 2025-03-17 RX ORDER — NYSTATIN 100000 [USP'U]/ML
500000 SUSPENSION ORAL 4 TIMES DAILY
Qty: 473 ML | Refills: 2 | Status: SHIPPED | OUTPATIENT
Start: 2025-03-17

## 2025-03-17 RX ORDER — FLUCONAZOLE 200 MG/1
200 TABLET ORAL DAILY
Qty: 14 TABLET | Refills: 0 | Status: SHIPPED | OUTPATIENT
Start: 2025-03-17

## 2025-03-17 RX ORDER — FOLIC ACID 1 MG/1
1 TABLET ORAL DAILY
Qty: 90 TABLET | Refills: 1 | OUTPATIENT
Start: 2025-03-17

## 2025-03-17 NOTE — TELEPHONE ENCOUNTER
Spoke with patient to let her know that her medication request of lisinopril needed to be requested through her primary care. Patient voiced understanding.

## 2025-03-17 NOTE — TELEPHONE ENCOUNTER
This nurse called the pt back and informed the pt that Dr Banks sent in Fluconazole for her. When questioned the pt completed the Nystatin the day before her last infusion tx.

## 2025-03-17 NOTE — TELEPHONE ENCOUNTER
This nurse called the pt and informed her of the Nystatin script sent into her pharm. The pt voiced understanding.

## 2025-03-17 NOTE — TELEPHONE ENCOUNTER
The pt is c/o dry mouth with a white tongue and constipation. When questioned the pt states that she increased her water intake, is brushing her teeth TID, and is using Biotene TID. The pt states that the whiteness will leave her tongue temporarily after brushing but comes back quickly. The pt states that her mouth drys quickly after drinking water.  When questioned the pt states that she had diarrhea the day after tx but it resolved with Imodium. The pt states that she has not had a BM since 3/14/25. This nurse instructed the pt to take Miralax QD, Stool Softener 1 cap BID (can increase up to 2 caps TID), increase fiber in her diet, and and increase PO fluids. The pt voiced understanding and asked what she needs to do about her mouth.

## 2025-04-03 ENCOUNTER — OFFICE VISIT (OUTPATIENT)
Dept: ONCOLOGY | Facility: HOSPITAL | Age: 80
End: 2025-04-03
Payer: MEDICARE

## 2025-04-03 ENCOUNTER — HOSPITAL ENCOUNTER (OUTPATIENT)
Dept: ONCOLOGY | Facility: HOSPITAL | Age: 80
Discharge: HOME | End: 2025-04-03
Payer: MEDICARE

## 2025-04-03 VITALS
HEART RATE: 61 BPM | TEMPERATURE: 98.9 F | OXYGEN SATURATION: 98 % | SYSTOLIC BLOOD PRESSURE: 154 MMHG | BODY MASS INDEX: 30.19 KG/M2 | HEIGHT: 65 IN | DIASTOLIC BLOOD PRESSURE: 59 MMHG | WEIGHT: 181.22 LBS | RESPIRATION RATE: 18 BRPM

## 2025-04-03 VITALS
OXYGEN SATURATION: 98 % | HEIGHT: 65 IN | WEIGHT: 181.22 LBS | RESPIRATION RATE: 18 BRPM | HEART RATE: 61 BPM | DIASTOLIC BLOOD PRESSURE: 59 MMHG | BODY MASS INDEX: 30.19 KG/M2 | SYSTOLIC BLOOD PRESSURE: 154 MMHG | TEMPERATURE: 98.9 F

## 2025-04-03 DIAGNOSIS — C34.32 MALIGNANT NEOPLASM OF LOWER LOBE OF LEFT LUNG: Primary | ICD-10-CM

## 2025-04-03 DIAGNOSIS — E87.1 HYPONATREMIA: ICD-10-CM

## 2025-04-03 DIAGNOSIS — Z51.11 CHEMOTHERAPY MANAGEMENT, ENCOUNTER FOR: ICD-10-CM

## 2025-04-03 DIAGNOSIS — Z45.2 ENCOUNTER FOR ADJUSTMENT OR MANAGEMENT OF VASCULAR ACCESS DEVICE: ICD-10-CM

## 2025-04-03 DIAGNOSIS — J15.1 PNEUMONIA DUE TO PSEUDOMONAS SPECIES, UNSPECIFIED LATERALITY, UNSPECIFIED PART OF LUNG: ICD-10-CM

## 2025-04-03 DIAGNOSIS — K59.00 CONSTIPATION, UNSPECIFIED CONSTIPATION TYPE: ICD-10-CM

## 2025-04-03 DIAGNOSIS — Z79.899 ENCOUNTER FOR LONG-TERM CURRENT USE OF HIGH RISK MEDICATION: ICD-10-CM

## 2025-04-03 LAB
ALBUMIN SERPL-MCNC: 3.9 G/DL (ref 3.5–5.2)
ALBUMIN/GLOB SERPL: 1.3 G/DL
ALP SERPL-CCNC: 46 U/L (ref 39–117)
ALT SERPL W P-5'-P-CCNC: 19 U/L (ref 1–33)
ANION GAP SERPL CALCULATED.3IONS-SCNC: 11.3 MMOL/L (ref 5–15)
AST SERPL-CCNC: 25 U/L (ref 1–32)
BASOPHILS # BLD AUTO: 0.03 10*3/MM3 (ref 0–0.2)
BASOPHILS NFR BLD AUTO: 0.6 % (ref 0–1.5)
BILIRUB SERPL-MCNC: 0.3 MG/DL (ref 0–1.2)
BUN SERPL-MCNC: 26 MG/DL (ref 8–23)
BUN/CREAT SERPL: 35.1 (ref 7–25)
CALCIUM SPEC-SCNC: 9.4 MG/DL (ref 8.6–10.5)
CHLORIDE SERPL-SCNC: 96 MMOL/L (ref 98–107)
CO2 SERPL-SCNC: 24.7 MMOL/L (ref 22–29)
CREAT SERPL-MCNC: 0.74 MG/DL (ref 0.57–1)
DEPRECATED RDW RBC AUTO: 47.1 FL (ref 37–54)
EGFRCR SERPLBLD CKD-EPI 2021: 83.4 ML/MIN/1.73
EOSINOPHIL # BLD AUTO: 0.03 10*3/MM3 (ref 0–0.4)
EOSINOPHIL NFR BLD AUTO: 0.6 % (ref 0.3–6.2)
ERYTHROCYTE [DISTWIDTH] IN BLOOD BY AUTOMATED COUNT: 15 % (ref 12.3–15.4)
GLOBULIN UR ELPH-MCNC: 2.9 GM/DL
GLUCOSE SERPL-MCNC: 104 MG/DL (ref 65–99)
HCT VFR BLD AUTO: 38.3 % (ref 34–46.6)
HGB BLD-MCNC: 13.2 G/DL (ref 12–15.9)
IMM GRANULOCYTES # BLD AUTO: 0.02 10*3/MM3 (ref 0–0.05)
IMM GRANULOCYTES NFR BLD AUTO: 0.4 % (ref 0–0.5)
LYMPHOCYTES # BLD AUTO: 1.04 10*3/MM3 (ref 0.7–3.1)
LYMPHOCYTES NFR BLD AUTO: 21.8 % (ref 19.6–45.3)
MCH RBC QN AUTO: 31.1 PG (ref 26.6–33)
MCHC RBC AUTO-ENTMCNC: 34.5 G/DL (ref 31.5–35.7)
MCV RBC AUTO: 90.3 FL (ref 79–97)
MONOCYTES # BLD AUTO: 0.49 10*3/MM3 (ref 0.1–0.9)
MONOCYTES NFR BLD AUTO: 10.3 % (ref 5–12)
NEUTROPHILS NFR BLD AUTO: 3.15 10*3/MM3 (ref 1.7–7)
NEUTROPHILS NFR BLD AUTO: 66.3 % (ref 42.7–76)
NRBC BLD AUTO-RTO: 0 /100 WBC (ref 0–0.2)
PLATELET # BLD AUTO: 224 10*3/MM3 (ref 140–450)
PMV BLD AUTO: 8.9 FL (ref 6–12)
POTASSIUM SERPL-SCNC: 4.4 MMOL/L (ref 3.5–5.2)
PROT SERPL-MCNC: 6.8 G/DL (ref 6–8.5)
RBC # BLD AUTO: 4.24 10*6/MM3 (ref 3.77–5.28)
SODIUM SERPL-SCNC: 132 MMOL/L (ref 136–145)
WBC NRBC COR # BLD AUTO: 4.76 10*3/MM3 (ref 3.4–10.8)

## 2025-04-03 PROCEDURE — 96413 CHEMO IV INFUSION 1 HR: CPT

## 2025-04-03 PROCEDURE — 25010000002 FOSAPREPITANT PER 1 MG: Performed by: INTERNAL MEDICINE

## 2025-04-03 PROCEDURE — 25010000002 CARBOPLATIN PER 50 MG: Performed by: INTERNAL MEDICINE

## 2025-04-03 PROCEDURE — 96375 TX/PRO/DX INJ NEW DRUG ADDON: CPT

## 2025-04-03 PROCEDURE — 85025 COMPLETE CBC W/AUTO DIFF WBC: CPT | Performed by: INTERNAL MEDICINE

## 2025-04-03 PROCEDURE — 25010000002 PALONOSETRON PER 25 MCG: Performed by: INTERNAL MEDICINE

## 2025-04-03 PROCEDURE — 25010000002 PEMETREXED PER 10 MG: Performed by: INTERNAL MEDICINE

## 2025-04-03 PROCEDURE — 25010000002 PEMETREXED 100 MG RECONSTITUTED SOLUTION 1 EACH VIAL: Performed by: INTERNAL MEDICINE

## 2025-04-03 PROCEDURE — 25810000003 SODIUM CHLORIDE 0.9 % SOLUTION: Performed by: INTERNAL MEDICINE

## 2025-04-03 PROCEDURE — 25010000002 HEPARIN LOCK FLUSH PER 10 UNITS: Performed by: INTERNAL MEDICINE

## 2025-04-03 PROCEDURE — 25810000003 SODIUM CHLORIDE 0.9 % SOLUTION 250 ML FLEX CONT: Performed by: INTERNAL MEDICINE

## 2025-04-03 PROCEDURE — 96411 CHEMO IV PUSH ADDL DRUG: CPT

## 2025-04-03 PROCEDURE — 96417 CHEMO IV INFUS EACH ADDL SEQ: CPT

## 2025-04-03 PROCEDURE — 96367 TX/PROPH/DG ADDL SEQ IV INF: CPT

## 2025-04-03 PROCEDURE — 80053 COMPREHEN METABOLIC PANEL: CPT | Performed by: INTERNAL MEDICINE

## 2025-04-03 PROCEDURE — 25010000002 PEMBROLIZUMAB 100 MG/4ML SOLUTION 4 ML VIAL: Performed by: INTERNAL MEDICINE

## 2025-04-03 RX ORDER — FAMOTIDINE 10 MG/ML
20 INJECTION, SOLUTION INTRAVENOUS AS NEEDED
Status: CANCELLED | OUTPATIENT
Start: 2025-04-03

## 2025-04-03 RX ORDER — SODIUM CHLORIDE 0.9 % (FLUSH) 0.9 %
20 SYRINGE (ML) INJECTION AS NEEDED
OUTPATIENT
Start: 2025-04-03

## 2025-04-03 RX ORDER — SODIUM CHLORIDE 9 MG/ML
20 INJECTION, SOLUTION INTRAVENOUS ONCE
Status: COMPLETED | OUTPATIENT
Start: 2025-04-03 | End: 2025-04-03

## 2025-04-03 RX ORDER — FAMOTIDINE 10 MG/ML
20 INJECTION, SOLUTION INTRAVENOUS AS NEEDED
Status: DISCONTINUED | OUTPATIENT
Start: 2025-04-03 | End: 2025-04-04 | Stop reason: HOSPADM

## 2025-04-03 RX ORDER — SODIUM CHLORIDE 0.9 % (FLUSH) 0.9 %
20 SYRINGE (ML) INJECTION AS NEEDED
Status: DISCONTINUED | OUTPATIENT
Start: 2025-04-03 | End: 2025-04-04 | Stop reason: HOSPADM

## 2025-04-03 RX ORDER — SODIUM CHLORIDE 9 MG/ML
20 INJECTION, SOLUTION INTRAVENOUS ONCE
Status: CANCELLED | OUTPATIENT
Start: 2025-04-03

## 2025-04-03 RX ORDER — HYDROCORTISONE SODIUM SUCCINATE 100 MG/2ML
100 INJECTION INTRAMUSCULAR; INTRAVENOUS AS NEEDED
Status: DISCONTINUED | OUTPATIENT
Start: 2025-04-03 | End: 2025-04-04 | Stop reason: HOSPADM

## 2025-04-03 RX ORDER — HEPARIN SODIUM (PORCINE) LOCK FLUSH IV SOLN 100 UNIT/ML 100 UNIT/ML
500 SOLUTION INTRAVENOUS AS NEEDED
OUTPATIENT
Start: 2025-04-03

## 2025-04-03 RX ORDER — HYDROCORTISONE SODIUM SUCCINATE 100 MG/2ML
100 INJECTION INTRAMUSCULAR; INTRAVENOUS AS NEEDED
Status: CANCELLED | OUTPATIENT
Start: 2025-04-03

## 2025-04-03 RX ORDER — HEPARIN SODIUM (PORCINE) LOCK FLUSH IV SOLN 100 UNIT/ML 100 UNIT/ML
500 SOLUTION INTRAVENOUS AS NEEDED
Status: DISCONTINUED | OUTPATIENT
Start: 2025-04-03 | End: 2025-04-04 | Stop reason: HOSPADM

## 2025-04-03 RX ORDER — DIPHENHYDRAMINE HYDROCHLORIDE 50 MG/ML
50 INJECTION, SOLUTION INTRAMUSCULAR; INTRAVENOUS AS NEEDED
Status: DISCONTINUED | OUTPATIENT
Start: 2025-04-03 | End: 2025-04-04 | Stop reason: HOSPADM

## 2025-04-03 RX ORDER — DIPHENHYDRAMINE HYDROCHLORIDE 50 MG/ML
50 INJECTION, SOLUTION INTRAMUSCULAR; INTRAVENOUS AS NEEDED
Status: CANCELLED | OUTPATIENT
Start: 2025-04-03

## 2025-04-03 RX ORDER — PALONOSETRON 0.05 MG/ML
0.25 INJECTION, SOLUTION INTRAVENOUS ONCE
Status: CANCELLED | OUTPATIENT
Start: 2025-04-03

## 2025-04-03 RX ORDER — PALONOSETRON 0.05 MG/ML
0.25 INJECTION, SOLUTION INTRAVENOUS ONCE
Status: COMPLETED | OUTPATIENT
Start: 2025-04-03 | End: 2025-04-03

## 2025-04-03 RX ADMIN — Medication 20 ML: at 13:21

## 2025-04-03 RX ADMIN — HEPARIN 500 UNITS: 100 SYRINGE at 13:21

## 2025-04-03 RX ADMIN — PEMETREXED DISODIUM 900 MG: 500 INJECTION, POWDER, LYOPHILIZED, FOR SOLUTION INTRAVENOUS at 12:27

## 2025-04-03 RX ADMIN — SODIUM CHLORIDE 590 MG: 9 INJECTION, SOLUTION INTRAVENOUS at 12:42

## 2025-04-03 RX ADMIN — FOSAPREPITANT 150 MG: 150 INJECTION, POWDER, LYOPHILIZED, FOR SOLUTION INTRAVENOUS at 11:05

## 2025-04-03 RX ADMIN — SODIUM CHLORIDE 200 MG: 9 INJECTION, SOLUTION INTRAVENOUS at 11:42

## 2025-04-03 RX ADMIN — SODIUM CHLORIDE 20 ML/HR: 9 INJECTION, SOLUTION INTRAVENOUS at 11:03

## 2025-04-03 RX ADMIN — PALONOSETRON HYDROCHLORIDE 0.25 MG: 0.25 INJECTION, SOLUTION INTRAVENOUS at 11:38

## 2025-04-03 NOTE — PROGRESS NOTES
Chief Complaint  FOLLOW UP 1    Provider, No Known  Mary Cruz APRN Subjective Darlene B Nacho presents to [facility] for lung adenocarcinoma    Patient is a very pleasant 79-year-old female with past medical history of hypertension, dyslipidemia, CKD who presents for lung adenocarcinoma.  Patient has CT chest done for lung nodule on 1/30/2025.  Shows stable irregular 1.6 cm left lower lobe nodule.  This showed stable mild mediastinal adenopathy which could be reactive or metastatic.  Ion bronchoscopy done same day showed left lower lobe biopsy positive for adenocarcinoma lipidic pattern.  EGFR, ROS, ALK all negative. PDL1 positive at 60%.  BAL culture positive for Pseudomonas.  She was started on IV cefepime for 10 days via PICC.  She has completed this.  MRI brain on 2/6/25 was negative for intracranial disease.  PET scan from 2/12/2025 showed a 1.6 cm left lower lobe nodule that was FDG avid.  FDG avid mediastinal and left hilar lymphadenopathy also shown.  Index subcarinal lymph nodes measure 2.5 cm with SUV max 6.6.  Also had a left infrahilar lymph node positive.      Interval History  Patient presents for follow-up today.  She has been started on chemotherapy with carboplatin pemetrexed and pembrolizumab as of 3/13/25. She is due for Cycle 2.  Patient reports constipation with this first cycle.  Constipation persisted despite MiraLAX.  She was on stool softeners and prunes.  She reports medical magnesia helped with constipation.  Denies any bleeding.  No fevers or chills.  No nausea or vomiting.  Denies any neuropathy.  She does report itching in the last day or so.  No rash associated.  Using topical cortisone a needed which helps.  Labs appropriate.  CBC normal.  ANC normal.  Ready for treatment today.      Oncology/Hematology History Overview Note   1/30/25: CT Chest done for lung nodule: Stable irregular 1.6 cm left lower lobe nodule,  potentially neoplastic. Per Fleischner guidelines this could be assessed by short-term follow-up 3-month chest CT, PET/CT and/or biopsy. Recommend pulmonary consultation for management. Infectious/inflammatory lower lobe predominant tree-in-bud nodularity slightly waxing and waning since previous comparison. Findings could reflect bronchiolitis, microaspiration or atypical infection. Recommend attention on follow-up imaging.   Stable mild mediastinal adenopathy which could be reactive or metastatic pending etiology of the left lower lobe nodule. Exophytic indeterminate right upper pole renal lesion measuring 4.1 cm. At this size nonemergent renal ultrasound is recommended to assess if lesion is solid or cystic. Aortic and severe coronary atherosclerotic disease.    1/30/25: ION bronchoscopy with biopsy: BAL culture positive for >100K CFU/mL of Pseudomonas. Left lower lobe biopsy positive for adenocarcinoma, lepidic pattern, lung primary. EGFR, ROS, ALK negative. PDL1 positive at 60%.     Started on IV antibiotic (Cefepime) x 10 days for lung pseudomonas per pulmonary team.     2/6/25: MRI brain negative for intracranial disease    2/12/25: NM PET: A 1.6 cm left lower lobe nodule is FDG avid with SUV max 7.4. There is mild bibasilar atelectasis. There is FDG-avid mediastinal and left hilar lymphadenopathy. An index subcarinal lymph node on image 85 measures 2.5 x 1.3 cm with SUV max 6.6. An index left infrahilar lymph node on image 94 has SUV max 9.4, with note that size measurement is difficult on this unenhanced exam. No metastatic disease outside of chest seen.     3/13/25: C1D1 neoadjuvant Carboplatin/Pemetrexed/Pembrolizumab     Malignant neoplasm of lower lobe of left lung   2/24/2025 Initial Diagnosis    Malignant neoplasm of lower lobe of left lung     2/24/2025 Cancer Staged    Staging form: Lung, AJCC V9  - Clinical: Stage IIB (cT1b, cN2a, cM0) - Signed by Reinaldo Banks MD on 2/24/2025      3/13/2025 -  Chemotherapy    OP LUNG Pembrolizumab 200 mg / Pemetrexed / Carboplatin AUC=5           Review of Systems   Constitutional:  Negative for appetite change, diaphoresis, fatigue, fever, unexpected weight gain and unexpected weight loss.   HENT:  Negative for hearing loss, mouth sores, sore throat, swollen glands, trouble swallowing and voice change.    Eyes:  Negative for blurred vision.   Respiratory:  Negative for cough, shortness of breath and wheezing.    Cardiovascular:  Negative for chest pain and palpitations.   Gastrointestinal:  Negative for abdominal pain, blood in stool, constipation, diarrhea, nausea and vomiting.   Endocrine: Negative for cold intolerance and heat intolerance.   Genitourinary:  Negative for difficulty urinating, dysuria, frequency, hematuria and urinary incontinence.   Musculoskeletal:  Negative for arthralgias, back pain and myalgias.   Skin:  Positive for dry skin and rash. Negative for skin lesions and wound.   Neurological:  Negative for dizziness, seizures, weakness, numbness and headache.   Hematological:  Does not bruise/bleed easily.   Psychiatric/Behavioral:  Negative for depressed mood. The patient is not nervous/anxious.    All other systems reviewed and are negative.    Current Outpatient Medications on File Prior to Visit   Medication Sig Dispense Refill    Breztri Aerosphere 160-9-4.8 MCG/ACT aerosol inhaler Inhale 2 puffs 2 (Two) Times a Day.      cetirizine (zyrTEC) 10 MG tablet Take 1 tablet by mouth Every Night.      Cholecalciferol 125 MCG (5000 UT) tablet Take 1 tablet by mouth Daily.      dexAMETHasone (DECADRON) 4 MG tablet Take 1 tablet twice daily starting the day before chemo, day of chemo, and day after chemo.  Take with food. 6 tablet 3    Docusate Sodium 100 MG capsule Take 1 tablet by mouth Every Night.      estradiol (ESTRACE) 0.1 MG/GM vaginal cream USE AS DIRECTED 3 TIMES A WEEK      ezetimibe (ZETIA) 10 MG tablet Take 1 tablet by mouth Every  "Night.      fenofibrate (TRICOR) 145 MG tablet Take 1 tablet by mouth Daily.      ferrous sulfate 324 (65 Fe) MG tablet delayed-release EC tablet Take 1 tablet by mouth Every Night.      fluconazole (DIFLUCAN) 200 MG tablet Take 1 tablet by mouth Daily. 14 tablet 0    folic acid (FOLVITE) 1 MG tablet Take 1 tablet by mouth Daily. Start at least 7 days prior to chemotherapy until at least 3 weeks after all chemotherapy. 30 tablet 4    HYDROcodone-acetaminophen (NORCO) 5-325 MG per tablet Take 1 tablet by mouth Every 6 (Six) Hours As Needed for Moderate Pain or Severe Pain. (Patient not taking: Reported on 3/13/2025) 5 tablet 0    lisinopril (PRINIVIL,ZESTRIL) 5 MG tablet Take 1 tablet by mouth Daily. 30 tablet 3    loratadine (CLARITIN) 10 MG tablet Take 1 tablet by mouth Daily.      magnesium gluconate (MAGONATE) 500 MG tablet Take 1 tablet by mouth Every Night.      montelukast (SINGULAIR) 10 MG tablet Take 1 tablet by mouth Every Night.      nystatin (MYCOSTATIN) 100,000 unit/mL suspension Take 5 mL by mouth 4 (Four) Times a Day. 473 mL 2    OLANZapine (zyPREXA) 5 MG tablet Take 1 tablet by mouth Every Night. Take nightly x 4 starting night of chemotherapy. 4 tablet 3    omeprazole (priLOSEC) 40 MG capsule Take 1 capsule by mouth Daily.      ondansetron (ZOFRAN) 8 MG tablet Take 1 tablet by mouth 3 (Three) Times a Day As Needed for Nausea or Vomiting. 30 tablet 3    simvastatin (ZOCOR) 20 MG tablet Take 1 tablet by mouth Daily.      Unable to find Take 1 each by mouth Daily. PROBIOTIC PREBIOTICS AND CRANBERRY       No current facility-administered medications on file prior to visit.       No Known Allergies  Past Medical History:   Diagnosis Date    GERD (gastroesophageal reflux disease)     History of sinus problem     Hypertension     Malignant neoplasm of lower lobe of left lung     Other hyperlipidemia     Pancreas disorder     states scanned yearly ot monitor \"fatty pancreas\"     Past Surgical History: " "  Procedure Laterality Date    BLADDER REPAIR      BRONCHOSCOPY WITH ION ROBOTIC ASSIST Left 01/30/2025    Procedure: BRONCHOSCOPY WITH ION ROBOT: BAL, REBUS, NEEDLE ASPIRATE, BIOPSIES insertion of lighted robot navigated instrument to view inside the lung;  Surgeon: Stevie Carson DO;  [facility]: [facility];  Service: Robotics - Pulmonary;  Laterality: Left;    LAPAROSCOPIC CHOLECYSTECTOMY      PARATHYROIDECTOMY      RECTAL PROLAPSE REPAIR  2021    TOTAL ABDOMINAL HYSTERECTOMY WITH SALPINGO OOPHORECTOMY      TOTAL HIP ARTHROPLASTY Left     VENOUS ACCESS DEVICE (PORT) INSERTION N/A 3/4/2025    Procedure: INSERTION VENOUS ACCESS DEVICE: Placement of a port in the tissue under the skin at your upper chest with a tube attached to it that goes into your vein;  Surgeon: Abhinav Martin MD;  Location: [facility];  Service: General;  Laterality: N/A;     Social History     Socioeconomic History    Marital status:    Tobacco Use    Smoking status: Never     Passive exposure: Never    Smokeless tobacco: Never   Vaping Use    Vaping status: Never Used   Substance and Sexual Activity    Alcohol use: Not Currently    Drug use: Never    Sexual activity: Defer     Family History   Problem Relation Age of Onset    Cancer Brother     Malig Hyperthermia Neg Hx        Objective   Physical Exam  Well appearing patient in no acute distress on RA  Anicteric sclerae, no rash on exposed skin  Respirations non-labored  Awake, alert, and oriented x 4. Speech intact. No gross neurologic deficit  Appropriate mood and affect    Vitals:    04/03/25 0930   BP: 154/59   Pulse: 61   Resp: 18   Temp: 98.9 °F (37.2 °C)   TempSrc: Temporal   SpO2: 98%   Weight: 82.2 kg (181 lb 3.5 oz)   Height: 164 cm (64.57\")   PainSc: 0-No pain       ECOG score: 0         PHQ-9 Total Score:           Result Review :   The following data was reviewed by: Reinaldo Banks MD on 04/03/25:  Lab Results   Component Value Date    HGB 13.2 " 04/03/2025    HCT 38.3 04/03/2025    MCV 90.3 04/03/2025     04/03/2025    WBC 4.76 04/03/2025    NEUTROABS 3.15 04/03/2025    LYMPHSABS 1.04 04/03/2025    MONOSABS 0.49 04/03/2025    EOSABS 0.03 04/03/2025    BASOSABS 0.03 04/03/2025     Lab Results   Component Value Date    GLUCOSE 104 (H) 04/03/2025    BUN 26 (H) 04/03/2025    CREATININE 0.74 04/03/2025     (L) 04/03/2025    K 4.4 04/03/2025    CL 96 (L) 04/03/2025    CO2 24.7 04/03/2025    CALCIUM 9.4 04/03/2025    PROTEINTOT 6.8 04/03/2025    ALBUMIN 3.9 04/03/2025    BILITOT 0.3 04/03/2025    ALKPHOS 46 04/03/2025    AST 25 04/03/2025    ALT 19 04/03/2025     Lab Results   Component Value Date    PHOS 3.1 08/06/2024    FREET4 1.11 03/13/2025    TSH 0.476 03/13/2025     Labs personally reviewed. CBC normal. Creatinine normal. Na low. Cl low.       XR Chest 1 View  Result Date: 3/4/2025  Impression: Port catheter tip is within the superior vena cava. Electronically Signed: Osvaldo Zapata MD  3/4/2025 1:26 PM EST  Workstation ID: [id number]          Assessment and Plan    Diagnoses and all orders for this visit:    1. Malignant neoplasm of lower lobe of left lung (Primary)    2. Chemotherapy management, encounter for    3. Hyponatremia    4. Constipation, unspecified constipation type    Other orders  -     Cancel: sodium chloride 0.9 % infusion  -     Cancel: Pembrolizumab (KEYTRUDA) 200 mg in sodium chloride 0.9 % 58 mL chemo IVPB  -     Cancel: palonosetron (ALOXI) injection 0.25 mg  -     Cancel: fosaprepitant (EMEND) 150 mg/100mL NS  -     Cancel: PEMEtrexed (ALIMTA) 950 mg in sodium chloride 0.9 % 138 mL chemo IVPB  -     Cancel: CARBOplatin (PARAPLATIN) 540 mg in sodium chloride 0.9 % 304 mL chemo IVPB  -     Cancel: Hydrocortisone Sod Suc (PF) (Solu-CORTEF) injection 100 mg  -     Cancel: diphenhydrAMINE (BENADRYL) injection 50 mg  -     Cancel: famotidine (PEPCID) injection 20 mg        Left lung adenocarcinoma  Clinical T1cN2a based off PET.   Lymph node biopsies have not been performed.  Patient did have Pseudomonas infection but these lymph nodes appear to be metastatic.  Prefer to treat patient with neoadjuvant chemoimmunotherapy followed by surgery.  Have personally discussed patient with thoracic surgeon Dr. Dozier, who is in agreement.  Plan will be carboplatin, pemetrexed combined with pembrolizumab given every 3 weeks for 4 cycles  Risk, benefit, side effect profile of these 3 medications were discussed in detail including immune related side effects.  As needed and scheduled antiemetics to be provided.  Dexamethasone premed to be provided.  B12 and folic acid while pemetrexed also will be provided. Port has been placed.     C1D1 Carbo/Pemetrexed/Pembrolizumab neoadjuvant 3/13/25. Tolerated well    C2D1 Carbo/Pemetrexed/Pembrolizumab 4/3/25. Continue same dose. Labs appropriate to proceed.    RTC for C3.    Repeat PET after C4 planned.     Constipation  MiraLAX not helpful.  Continue daily stool softeners.  Increase fiber.  Can take milk of magnesia as needed.    Hyponatremia  Increase hydration.     Itching  OK for prn cortisone topical or oral benadryl.       This is an acute or chronic illness that poses a threat to life or bodily function. The above treatment plan involves a high risk of complications and/or mortality of patient management.        I spent 20 minutes caring for Lolis on this date of service. This time includes time spent by me in the following activities:preparing for the visit, reviewing tests, obtaining and/or reviewing a separately obtained history, performing a medically appropriate examination and/or evaluation , counseling and educating the patient/family/caregiver, ordering medications, tests, or procedures, referring and communicating with other health care professionals , documenting information in the medical record, independently interpreting results and communicating that information with the  patient/family/caregiver, and care coordination    Patient Follow Up: C3 chemo  Patient was given instructions and counseling regarding her condition or for health maintenance advice. Please see specific information pulled into the AVS if appropriate.

## 2025-04-22 ENCOUNTER — OFFICE VISIT (OUTPATIENT)
Dept: ONCOLOGY | Facility: HOSPITAL | Age: 80
End: 2025-04-22
Payer: MEDICARE

## 2025-04-22 ENCOUNTER — HOSPITAL ENCOUNTER (OUTPATIENT)
Dept: ONCOLOGY | Facility: HOSPITAL | Age: 80
Discharge: HOME | End: 2025-04-22
Payer: MEDICARE

## 2025-04-22 VITALS
HEART RATE: 72 BPM | SYSTOLIC BLOOD PRESSURE: 169 MMHG | TEMPERATURE: 97.6 F | RESPIRATION RATE: 16 BRPM | WEIGHT: 188.49 LBS | BODY MASS INDEX: 31.4 KG/M2 | OXYGEN SATURATION: 99 % | HEIGHT: 65 IN | DIASTOLIC BLOOD PRESSURE: 71 MMHG

## 2025-04-22 DIAGNOSIS — K59.04 CHRONIC IDIOPATHIC CONSTIPATION: ICD-10-CM

## 2025-04-22 DIAGNOSIS — T45.1X5A CHEMOTHERAPY-INDUCED FATIGUE: ICD-10-CM

## 2025-04-22 DIAGNOSIS — Z79.899 ENCOUNTER FOR LONG-TERM CURRENT USE OF HIGH RISK MEDICATION: ICD-10-CM

## 2025-04-22 DIAGNOSIS — J15.1 PNEUMONIA DUE TO PSEUDOMONAS SPECIES, UNSPECIFIED LATERALITY, UNSPECIFIED PART OF LUNG: ICD-10-CM

## 2025-04-22 DIAGNOSIS — C34.32 MALIGNANT NEOPLASM OF LOWER LOBE OF LEFT LUNG: ICD-10-CM

## 2025-04-22 DIAGNOSIS — Z45.2 ENCOUNTER FOR ADJUSTMENT OR MANAGEMENT OF VASCULAR ACCESS DEVICE: Primary | ICD-10-CM

## 2025-04-22 DIAGNOSIS — C34.32 MALIGNANT NEOPLASM OF LOWER LOBE OF LEFT LUNG: Primary | ICD-10-CM

## 2025-04-22 DIAGNOSIS — R53.83 CHEMOTHERAPY-INDUCED FATIGUE: ICD-10-CM

## 2025-04-22 PROBLEM — K59.00 CONSTIPATION: Status: ACTIVE | Noted: 2025-04-22

## 2025-04-22 LAB
ALBUMIN SERPL-MCNC: 4 G/DL (ref 3.5–5.2)
ALBUMIN/GLOB SERPL: 1.6 G/DL
ALP SERPL-CCNC: 44 U/L (ref 39–117)
ALT SERPL W P-5'-P-CCNC: 16 U/L (ref 1–33)
ANION GAP SERPL CALCULATED.3IONS-SCNC: 10.9 MMOL/L (ref 5–15)
AST SERPL-CCNC: 27 U/L (ref 1–32)
BASOPHILS # BLD AUTO: 0.01 10*3/MM3 (ref 0–0.2)
BASOPHILS NFR BLD AUTO: 0.3 % (ref 0–1.5)
BILIRUB SERPL-MCNC: 0.3 MG/DL (ref 0–1.2)
BUN SERPL-MCNC: 18 MG/DL (ref 8–23)
BUN/CREAT SERPL: 21.2 (ref 7–25)
CALCIUM SPEC-SCNC: 9.4 MG/DL (ref 8.6–10.5)
CHLORIDE SERPL-SCNC: 100 MMOL/L (ref 98–107)
CO2 SERPL-SCNC: 25.1 MMOL/L (ref 22–29)
CREAT SERPL-MCNC: 0.85 MG/DL (ref 0.57–1)
DEPRECATED RDW RBC AUTO: 49.1 FL (ref 37–54)
EGFRCR SERPLBLD CKD-EPI 2021: 70.7 ML/MIN/1.73
EOSINOPHIL # BLD AUTO: 0.04 10*3/MM3 (ref 0–0.4)
EOSINOPHIL NFR BLD AUTO: 1.1 % (ref 0.3–6.2)
ERYTHROCYTE [DISTWIDTH] IN BLOOD BY AUTOMATED COUNT: 15.5 % (ref 12.3–15.4)
GLOBULIN UR ELPH-MCNC: 2.5 GM/DL
GLUCOSE SERPL-MCNC: 93 MG/DL (ref 65–99)
HCT VFR BLD AUTO: 33.7 % (ref 34–46.6)
HGB BLD-MCNC: 11.4 G/DL (ref 12–15.9)
IMM GRANULOCYTES # BLD AUTO: 0.01 10*3/MM3 (ref 0–0.05)
IMM GRANULOCYTES NFR BLD AUTO: 0.3 % (ref 0–0.5)
LYMPHOCYTES # BLD AUTO: 1.02 10*3/MM3 (ref 0.7–3.1)
LYMPHOCYTES NFR BLD AUTO: 28.9 % (ref 19.6–45.3)
MCH RBC QN AUTO: 31.8 PG (ref 26.6–33)
MCHC RBC AUTO-ENTMCNC: 33.8 G/DL (ref 31.5–35.7)
MCV RBC AUTO: 93.9 FL (ref 79–97)
MONOCYTES # BLD AUTO: 0.4 10*3/MM3 (ref 0.1–0.9)
MONOCYTES NFR BLD AUTO: 11.3 % (ref 5–12)
NEUTROPHILS NFR BLD AUTO: 2.05 10*3/MM3 (ref 1.7–7)
NEUTROPHILS NFR BLD AUTO: 58.1 % (ref 42.7–76)
NRBC BLD AUTO-RTO: 0 /100 WBC (ref 0–0.2)
PLATELET # BLD AUTO: 117 10*3/MM3 (ref 140–450)
PMV BLD AUTO: 8.9 FL (ref 6–12)
POTASSIUM SERPL-SCNC: 4.1 MMOL/L (ref 3.5–5.2)
PROT SERPL-MCNC: 6.5 G/DL (ref 6–8.5)
RBC # BLD AUTO: 3.59 10*6/MM3 (ref 3.77–5.28)
SODIUM SERPL-SCNC: 136 MMOL/L (ref 136–145)
T4 FREE SERPL-MCNC: 1.12 NG/DL (ref 0.92–1.68)
TSH SERPL DL<=0.05 MIU/L-ACNC: 1.68 UIU/ML (ref 0.27–4.2)
WBC NRBC COR # BLD AUTO: 3.53 10*3/MM3 (ref 3.4–10.8)

## 2025-04-22 PROCEDURE — 84439 ASSAY OF FREE THYROXINE: CPT | Performed by: INTERNAL MEDICINE

## 2025-04-22 PROCEDURE — 25010000002 HEPARIN LOCK FLUSH PER 10 UNITS: Performed by: INTERNAL MEDICINE

## 2025-04-22 PROCEDURE — 80053 COMPREHEN METABOLIC PANEL: CPT | Performed by: INTERNAL MEDICINE

## 2025-04-22 PROCEDURE — 85025 COMPLETE CBC W/AUTO DIFF WBC: CPT | Performed by: INTERNAL MEDICINE

## 2025-04-22 PROCEDURE — 84443 ASSAY THYROID STIM HORMONE: CPT | Performed by: INTERNAL MEDICINE

## 2025-04-22 PROCEDURE — 36591 DRAW BLOOD OFF VENOUS DEVICE: CPT

## 2025-04-22 RX ORDER — DIPHENHYDRAMINE HYDROCHLORIDE 50 MG/ML
50 INJECTION, SOLUTION INTRAMUSCULAR; INTRAVENOUS AS NEEDED
Status: CANCELLED | OUTPATIENT
Start: 2025-04-24

## 2025-04-22 RX ORDER — GUAIFENESIN 600 MG/1
1200 TABLET, EXTENDED RELEASE ORAL 2 TIMES DAILY
Qty: 60 TABLET | Refills: 0 | Status: SHIPPED | OUTPATIENT
Start: 2025-04-22 | End: 2025-05-07

## 2025-04-22 RX ORDER — PALONOSETRON 0.05 MG/ML
0.25 INJECTION, SOLUTION INTRAVENOUS ONCE
Status: CANCELLED | OUTPATIENT
Start: 2025-04-24

## 2025-04-22 RX ORDER — HEPARIN SODIUM (PORCINE) LOCK FLUSH IV SOLN 100 UNIT/ML 100 UNIT/ML
500 SOLUTION INTRAVENOUS AS NEEDED
Status: CANCELLED | OUTPATIENT
Start: 2025-04-24

## 2025-04-22 RX ORDER — HEPARIN SODIUM (PORCINE) LOCK FLUSH IV SOLN 100 UNIT/ML 100 UNIT/ML
500 SOLUTION INTRAVENOUS AS NEEDED
Status: DISCONTINUED | OUTPATIENT
Start: 2025-04-22 | End: 2025-04-23 | Stop reason: HOSPADM

## 2025-04-22 RX ORDER — FAMOTIDINE 10 MG/ML
20 INJECTION, SOLUTION INTRAVENOUS AS NEEDED
Status: CANCELLED | OUTPATIENT
Start: 2025-04-24

## 2025-04-22 RX ORDER — CYANOCOBALAMIN 1000 UG/ML
1000 INJECTION, SOLUTION INTRAMUSCULAR; SUBCUTANEOUS ONCE
Status: CANCELLED | OUTPATIENT
Start: 2025-04-24 | End: 2025-04-24

## 2025-04-22 RX ORDER — SODIUM CHLORIDE 0.9 % (FLUSH) 0.9 %
20 SYRINGE (ML) INJECTION AS NEEDED
Status: DISCONTINUED | OUTPATIENT
Start: 2025-04-22 | End: 2025-04-23 | Stop reason: HOSPADM

## 2025-04-22 RX ORDER — HYDROCORTISONE SODIUM SUCCINATE 100 MG/2ML
100 INJECTION INTRAMUSCULAR; INTRAVENOUS AS NEEDED
Status: CANCELLED | OUTPATIENT
Start: 2025-04-24

## 2025-04-22 RX ORDER — SODIUM CHLORIDE 0.9 % (FLUSH) 0.9 %
20 SYRINGE (ML) INJECTION AS NEEDED
Status: CANCELLED | OUTPATIENT
Start: 2025-04-22

## 2025-04-22 RX ORDER — SODIUM CHLORIDE 9 MG/ML
20 INJECTION, SOLUTION INTRAVENOUS ONCE
Status: CANCELLED | OUTPATIENT
Start: 2025-04-24

## 2025-04-22 RX ORDER — LACTULOSE 10 G/15ML
20 SOLUTION ORAL 2 TIMES DAILY PRN
Qty: 1500 ML | Refills: 2 | Status: SHIPPED | OUTPATIENT
Start: 2025-04-22 | End: 2025-05-22

## 2025-04-22 RX ADMIN — Medication 20 ML: at 08:59

## 2025-04-22 RX ADMIN — HEPARIN 500 UNITS: 100 SYRINGE at 09:02

## 2025-04-22 NOTE — PROGRESS NOTES
Oncology Follow up visit    Encounter Date: 4/22/2025    Subjective   Chief Complaint   Patient presents with    FOLLOW UP 1     Patient is a very pleasant 79-year-old female with past medical history of hypertension, dyslipidemia, CKD who presents for lung adenocarcinoma.  Patient has CT chest done for lung nodule on 1/30/2025.  Shows stable irregular 1.6 cm left lower lobe nodule.  This showed stable mild mediastinal adenopathy which could be reactive or metastatic.  Ion bronchoscopy done same day showed left lower lobe biopsy positive for adenocarcinoma lipidic pattern.  EGFR, ROS, ALK all negative. PDL1 positive at 60%.  BAL culture positive for Pseudomonas.  She was started on IV cefepime for 10 days via PICC.  She has completed this.  MRI brain on 2/6/25 was negative for intracranial disease.  PET scan from 2/12/2025 showed a 1.6 cm left lower lobe nodule that was FDG avid.  FDG avid mediastinal and left hilar lymphadenopathy also shown.  Index subcarinal lymph nodes measure 2.5 cm with SUV max 6.6.  Also had a left infrahilar lymph node positive.      Interval History: She had increased fatigue lasting for few days after last treatment. Occasional dry cough present. No nausea, vomiting. She has been having difficulties with sleep, waking up after sometime and then having difficulty going back to sleep. Constipation is partially controlled with medications.     Cancer Staging   Malignant neoplasm of lower lobe of left lung  Staging form: Lung, AJCC V9  - Clinical: Stage IIB (cT1b, cN2a, cM0) - Signed by Reinaldo Banks MD on 2/24/2025      Oncology History  Oncology/Hematology History Overview Note   1/30/25: CT Chest done for lung nodule: Stable irregular 1.6 cm left lower lobe nodule, potentially neoplastic. Per Fleischner guidelines this could be assessed by short-term follow-up 3-month chest CT, PET/CT and/or biopsy. Recommend pulmonary consultation for management. Infectious/inflammatory lower  lobe predominant tree-in-bud nodularity slightly waxing and waning since previous comparison. Findings could reflect bronchiolitis, microaspiration or atypical infection. Recommend attention on follow-up imaging.   Stable mild mediastinal adenopathy which could be reactive or metastatic pending etiology of the left lower lobe nodule. Exophytic indeterminate right upper pole renal lesion measuring 4.1 cm. At this size nonemergent renal ultrasound is recommended to assess if lesion is solid or cystic. Aortic and severe coronary atherosclerotic disease.    1/30/25: ION bronchoscopy with biopsy: BAL culture positive for >100K CFU/mL of Pseudomonas. Left lower lobe biopsy positive for adenocarcinoma, lepidic pattern, lung primary. EGFR, ROS, ALK negative. PDL1 positive at 60%.     Started on IV antibiotic (Cefepime) x 10 days for lung pseudomonas per pulmonary team.     2/6/25: MRI brain negative for intracranial disease    2/12/25: NM PET: A 1.6 cm left lower lobe nodule is FDG avid with SUV max 7.4. There is mild bibasilar atelectasis. There is FDG-avid mediastinal and left hilar lymphadenopathy. An index subcarinal lymph node on image 85 measures 2.5 x 1.3 cm with SUV max 6.6. An index left infrahilar lymph node on image 94 has SUV max 9.4, with note that size measurement is difficult on this unenhanced exam. No metastatic disease outside of chest seen.     3/13/25: C1D1 neoadjuvant Carboplatin/Pemetrexed/Pembrolizumab  4/03/25: C2D1     Malignant neoplasm of lower lobe of left lung   2/24/2025 Initial Diagnosis    Malignant neoplasm of lower lobe of left lung     2/24/2025 Cancer Staged    Staging form: Lung, AJCC V9  - Clinical: Stage IIB (cT1b, cN2a, cM0) - Signed by Reinaldo Banks MD on 2/24/2025     3/13/2025 -  Chemotherapy    OP LUNG Pembrolizumab 200 mg / Pemetrexed / Carboplatin AUC=5           Review of Systems   All other systems reviewed and are negative.        Past Medical History  She  has a  "past medical history of GERD (gastroesophageal reflux disease), History of sinus problem, Hypertension, Malignant neoplasm of lower lobe of left lung, Other hyperlipidemia, and Pancreas disorder.     Past Surgical History  She  has a past surgical history that includes Total abdominal hysterectomy w/ bilateral salpingoophorectomy; Bladder repair; Laparoscopic cholecystectomy; Total hip arthroplasty (Left); BRONCHOSCOPY WITH ION ROBOTIC ASSIST (Left, 01/30/2025); Rectal prolapse repair (2021); Parathyroidectomy; and Venous Access Device (Port) (N/A, 3/4/2025).     Family History  Cancer-related family history includes Cancer in her brother.    Social History  She  reports that she has never smoked. She has never been exposed to tobacco smoke. She has never used smokeless tobacco. She reports that she does not currently use alcohol. She reports that she does not use drugs.    Objective   /71   Pulse 72   Temp 97.6 °F (36.4 °C) (Temporal)   Resp 16   Ht 164 cm (64.57\")   Wt 85.5 kg (188 lb 7.9 oz)   SpO2 99%   BMI 31.79 kg/m²   Body surface area is 1.92 meters squared.  Body mass index is 31.79 kg/m².  Physical Exam  Vitals reviewed.   Constitutional:       Appearance: Normal appearance.   HENT:      Head: Normocephalic.      Nose: Nose normal.      Mouth/Throat:      Mouth: Mucous membranes are moist.   Eyes:      General: No scleral icterus.     Conjunctiva/sclera: Conjunctivae normal.   Cardiovascular:      Rate and Rhythm: Normal rate.      Pulses: Normal pulses.   Pulmonary:      Effort: Pulmonary effort is normal.      Breath sounds: Normal breath sounds.   Musculoskeletal:      Cervical back: Normal range of motion.   Skin:     General: Skin is warm.   Neurological:      Mental Status: She is alert. Mental status is at baseline.   Psychiatric:         Mood and Affect: Mood normal.         Behavior: Behavior normal.         Labs, Imaging, Pathology:  Lab Results   Component Value Date    WBC 4.76 " 04/03/2025    WBC 3.18 (L) 09/19/2021    WBC UA 0-2 08/06/2024    MCV 90.3 04/03/2025    MCV 93.5 09/19/2021    RDW 15.0 04/03/2025    RDW 14.4 09/19/2021    RDW-SD 47.1 04/03/2025    LYMPHS ABS 1.04 04/03/2025    LYMPHS ABS 1.07 09/19/2021    LYMPHS FL 12 01/30/2025    MONOS ABS 0.49 04/03/2025    MONOS ABS 0.48 09/19/2021    EOS ABS 0.03 04/03/2025    EOS ABS 0.23 09/19/2021    EOSINOPHIL % 0.6 04/03/2025    EOSINOPHIL % 7.2 (H) 09/19/2021    EOSINOPHILS, FLUID 2 01/30/2025    BASOS ABS 0.03 04/03/2025    BASOS ABS 0.02 09/19/2021     Lab Results   Component Value Date    BUN 26 (H) 04/03/2025    BUN 17 09/20/2021    BUN / CREAT RATIO 35.1 (H) 04/03/2025    BUN / CREAT RATIO 20.0 09/20/2021    CREATININE 0.74 04/03/2025    CREATININE 1.00 02/06/2025    CREATININE 0.85 09/20/2021    CREATININE UR 35.3 08/06/2024    CREATININE URINE, RANDOM 54.7 04/05/2021    KETONES UA Negative 08/06/2024    KLEAERPCR Not Detected 01/30/2025    KLEOXYPCR Not Detected 01/30/2025    KLEPNEPCR Not Detected 01/30/2025    KPCGENE Not Detected 01/30/2025    CLARITY UA Cloudy (A) 08/06/2024    CLINICAL INFORMATION  01/30/2025     Lung nodules      CALCIUM 9.4 04/03/2025    CALCIUM 9.4 09/20/2021    CASE REPORT  01/30/2025     Surgical Pathology Report                         Case: [id number]                                  Authorizing Provider:  Stevie Carson, Collected:           01/30/2025 11:48 AM                                 DO                                                                           Ordering Location:     [facility] MAIN Received:            01/31/2025 09:42 AM                                 OR                                                                           Pathologist:           Luz Marina Fraser DO                                                       Specimen:    Lung, Left Lower Lobe, LLL biopsies                                                        ALBUMIN 3.9 04/03/2025     ALBUMIN 3.0 (L) 09/19/2021    AST (SGOT) 25 04/03/2025    AST (SGOT) 21 09/19/2021    ALT (SGPT) 19 04/03/2025    ALT (SGPT) 14 09/19/2021    ALK PHOS 46 04/03/2025    ALK PHOS 30 (L) 09/19/2021    GLUCOSE 104 (H) 04/03/2025    GLUCOSE 86 09/20/2021    GLUCOSE UA Negative 08/06/2024     Estimated Creatinine Clearance: 68.1 mL/min (by C-G formula based on SCr of 0.74 mg/dL).    Assessment & Plan   Diagnoses and all orders for this visit:    Malignant neoplasm of lower lobe of left lung    Chronic idiopathic constipation    Chemotherapy-induced fatigue    Other orders  -     sodium chloride 0.9 % infusion  -     Pembrolizumab (KEYTRUDA) 200 mg in sodium chloride 0.9 % 58 mL chemo IVPB  -     palonosetron (ALOXI) injection 0.25 mg  -     fosaprepitant (EMEND) 150 mg/100mL NS  -     PEMEtrexed (ALIMTA) 950 mg in sodium chloride 0.9 % 138 mL chemo IVPB  -     CARBOplatin (PARAPLATIN) 540 mg in sodium chloride 0.9 % 304 mL chemo IVPB  -     cyanocobalamin injection 1,000 mcg  -     Hydrocortisone Sod Suc (PF) (Solu-CORTEF) injection 100 mg  -     diphenhydrAMINE (BENADRYL) injection 50 mg  -     famotidine (PEPCID) injection 20 mg  -     lactulose (CHRONULAC) 10 GM/15ML solution; Take 30 mL by mouth 2 (Two) Times a Day As Needed (constipation) for up to 30 days.  -     guaiFENesin (Mucinex) 600 MG 12 hr tablet; Take 2 tablets by mouth 2 (Two) Times a Day for 15 days.      Left lung adenocarcinoma  Clinical T1cN2a based off PET.  Lymph node biopsies have not been performed.  Patient did have Pseudomonas infection but these lymph nodes appear to be metastatic.  Prefer to treat patient with neoadjuvant chemoimmunotherapy followed by surgery.  Have personally discussed patient with thoracic surgeon Dr. Dozier, who is in agreement.  Plan will be carboplatin, pemetrexed combined with pembrolizumab given every 3 weeks for 4 cycles  Risk, benefit, side effect profile of these 3 medications were discussed in detail including  immune related side effects.  As needed and scheduled antiemetics to be provided.  Dexamethasone premed to be provided.  B12 and folic acid while pemetrexed also will be provided. Port has been placed.      C1D1 Carbo/Pemetrexed/Pembrolizumab neoadjuvant 3/13/25. Tolerated well     C2D1 Carbo/Pemetrexed/Pembrolizumab 4/3/25.     C3D1 on 4/24/25, Increased fatigue after last treatment. Continue same dose since treatment goal is curative. Labs pending.     RTC for C4. Repeat PET after C4 planned.      Constipation  MiraLAX not helpful. Prescribed lactulose and continue metamucil. Increase oral hydration. Can take milk of magnesia as needed.    Insomnia  Advised melatonin OTC     Cough  Sent mucinex prn     Performance Status  1 - Symptomatic but completely ambulatory    Labs reviewed with respect to bone marrow, renal and hepatic functions and discussed with patient. The current treatment with antineoplastic therapy requires ongoing monitoring of toxicity by me, due to the risks of severe side effects from the therapy. Selection, dosing and schedule modifications, administration of anti-cancer agents and the management of associated toxicities and supportive care requiring complex medical decision making.     Aasems Jacob, MD  Hematology/Oncology

## 2025-04-24 ENCOUNTER — HOSPITAL ENCOUNTER (OUTPATIENT)
Dept: ONCOLOGY | Facility: HOSPITAL | Age: 80
Discharge: HOME | End: 2025-04-24
Admitting: INTERNAL MEDICINE
Payer: MEDICARE

## 2025-04-24 VITALS
BODY MASS INDEX: 31.68 KG/M2 | WEIGHT: 187.83 LBS | TEMPERATURE: 98.1 F | SYSTOLIC BLOOD PRESSURE: 150 MMHG | OXYGEN SATURATION: 99 % | DIASTOLIC BLOOD PRESSURE: 65 MMHG | RESPIRATION RATE: 18 BRPM | HEART RATE: 77 BPM

## 2025-04-24 DIAGNOSIS — Z79.899 ENCOUNTER FOR LONG-TERM CURRENT USE OF HIGH RISK MEDICATION: ICD-10-CM

## 2025-04-24 DIAGNOSIS — C34.32 MALIGNANT NEOPLASM OF LOWER LOBE OF LEFT LUNG: Primary | ICD-10-CM

## 2025-04-24 DIAGNOSIS — Z45.2 ENCOUNTER FOR ADJUSTMENT OR MANAGEMENT OF VASCULAR ACCESS DEVICE: ICD-10-CM

## 2025-04-24 DIAGNOSIS — J15.1 PNEUMONIA DUE TO PSEUDOMONAS SPECIES, UNSPECIFIED LATERALITY, UNSPECIFIED PART OF LUNG: ICD-10-CM

## 2025-04-24 PROCEDURE — 96417 CHEMO IV INFUS EACH ADDL SEQ: CPT

## 2025-04-24 PROCEDURE — 25010000002 CARBOPLATIN PER 50 MG: Performed by: INTERNAL MEDICINE

## 2025-04-24 PROCEDURE — 25010000002 FOSAPREPITANT PER 1 MG: Performed by: INTERNAL MEDICINE

## 2025-04-24 PROCEDURE — 25010000002 PALONOSETRON PER 25 MCG: Performed by: INTERNAL MEDICINE

## 2025-04-24 PROCEDURE — 25010000002 PEMETREXED PER 10 MG: Performed by: INTERNAL MEDICINE

## 2025-04-24 PROCEDURE — 96375 TX/PRO/DX INJ NEW DRUG ADDON: CPT

## 2025-04-24 PROCEDURE — 25010000002 HEPARIN LOCK FLUSH PER 10 UNITS: Performed by: INTERNAL MEDICINE

## 2025-04-24 PROCEDURE — 25810000003 SODIUM CHLORIDE 0.9 % SOLUTION 250 ML FLEX CONT: Performed by: INTERNAL MEDICINE

## 2025-04-24 PROCEDURE — 25810000003 SODIUM CHLORIDE 0.9 % SOLUTION: Performed by: INTERNAL MEDICINE

## 2025-04-24 PROCEDURE — 96367 TX/PROPH/DG ADDL SEQ IV INF: CPT

## 2025-04-24 PROCEDURE — 25010000002 CYANOCOBALAMIN PER 1000 MCG: Performed by: INTERNAL MEDICINE

## 2025-04-24 PROCEDURE — 96413 CHEMO IV INFUSION 1 HR: CPT

## 2025-04-24 PROCEDURE — 96411 CHEMO IV PUSH ADDL DRUG: CPT

## 2025-04-24 PROCEDURE — 96372 THER/PROPH/DIAG INJ SC/IM: CPT

## 2025-04-24 PROCEDURE — 25010000002 PEMETREXED 100 MG RECONSTITUTED SOLUTION 1 EACH VIAL: Performed by: INTERNAL MEDICINE

## 2025-04-24 PROCEDURE — 25010000002 PEMBROLIZUMAB 100 MG/4ML SOLUTION 4 ML VIAL: Performed by: INTERNAL MEDICINE

## 2025-04-24 RX ORDER — HEPARIN SODIUM (PORCINE) LOCK FLUSH IV SOLN 100 UNIT/ML 100 UNIT/ML
500 SOLUTION INTRAVENOUS AS NEEDED
OUTPATIENT
Start: 2025-04-24

## 2025-04-24 RX ORDER — PALONOSETRON 0.05 MG/ML
0.25 INJECTION, SOLUTION INTRAVENOUS ONCE
Status: COMPLETED | OUTPATIENT
Start: 2025-04-24 | End: 2025-04-24

## 2025-04-24 RX ORDER — CYANOCOBALAMIN 1000 UG/ML
1000 INJECTION, SOLUTION INTRAMUSCULAR; SUBCUTANEOUS ONCE
Status: COMPLETED | OUTPATIENT
Start: 2025-04-24 | End: 2025-04-24

## 2025-04-24 RX ORDER — SODIUM CHLORIDE 0.9 % (FLUSH) 0.9 %
20 SYRINGE (ML) INJECTION AS NEEDED
Status: DISCONTINUED | OUTPATIENT
Start: 2025-04-24 | End: 2025-04-26 | Stop reason: HOSPADM

## 2025-04-24 RX ORDER — SODIUM CHLORIDE 0.9 % (FLUSH) 0.9 %
20 SYRINGE (ML) INJECTION AS NEEDED
OUTPATIENT
Start: 2025-04-24

## 2025-04-24 RX ORDER — SODIUM CHLORIDE 9 MG/ML
20 INJECTION, SOLUTION INTRAVENOUS ONCE
Status: COMPLETED | OUTPATIENT
Start: 2025-04-24 | End: 2025-04-24

## 2025-04-24 RX ORDER — HEPARIN SODIUM (PORCINE) LOCK FLUSH IV SOLN 100 UNIT/ML 100 UNIT/ML
500 SOLUTION INTRAVENOUS AS NEEDED
Status: DISCONTINUED | OUTPATIENT
Start: 2025-04-24 | End: 2025-04-26 | Stop reason: HOSPADM

## 2025-04-24 RX ADMIN — SODIUM CHLORIDE 500 MG: 9 INJECTION, SOLUTION INTRAVENOUS at 10:57

## 2025-04-24 RX ADMIN — HEPARIN 500 UNITS: 100 SYRINGE at 11:40

## 2025-04-24 RX ADMIN — FOSAPREPITANT 150 MG: 150 INJECTION, POWDER, LYOPHILIZED, FOR SOLUTION INTRAVENOUS at 09:54

## 2025-04-24 RX ADMIN — SODIUM CHLORIDE 20 ML/HR: 9 INJECTION, SOLUTION INTRAVENOUS at 09:00

## 2025-04-24 RX ADMIN — PALONOSETRON HYDROCHLORIDE 0.25 MG: 0.25 INJECTION, SOLUTION INTRAVENOUS at 09:52

## 2025-04-24 RX ADMIN — Medication 20 ML: at 11:41

## 2025-04-24 RX ADMIN — PEMETREXED DISODIUM 900 MG: 500 INJECTION, POWDER, LYOPHILIZED, FOR SOLUTION INTRAVENOUS at 10:35

## 2025-04-24 RX ADMIN — SODIUM CHLORIDE 200 MG: 9 INJECTION, SOLUTION INTRAVENOUS at 09:04

## 2025-04-24 RX ADMIN — CYANOCOBALAMIN 1000 MCG: 1000 INJECTION, SOLUTION INTRAMUSCULAR at 11:00

## 2025-04-29 ENCOUNTER — HOSPITAL ENCOUNTER (OUTPATIENT)
Dept: MAMMOGRAPHY | Facility: HOSPITAL | Age: 80
Discharge: HOME | End: 2025-04-29
Admitting: NURSE PRACTITIONER
Payer: MEDICARE

## 2025-04-29 DIAGNOSIS — Z12.31 VISIT FOR SCREENING MAMMOGRAM: ICD-10-CM

## 2025-04-29 PROCEDURE — 77063 BREAST TOMOSYNTHESIS BI: CPT

## 2025-04-29 PROCEDURE — 77067 SCR MAMMO BI INCL CAD: CPT

## 2025-05-05 NOTE — PROGRESS NOTES
Primary Care Provider  Mary Cruz APRN     Referring Provider  No ref. provider found       Patient or patient representative verbalized consent for the use of Ambient Listening during the visit with  ROBE Perry for chart documentation. 5/6/2025  08:35 EDT    Chief Complaint  Cough (Dry cough ) and Follow-up (3 month f/up - PET 2/12, MRI 2/6 and PFT 2/6 )    Subjective          Lolis Griffith presents to [facility]  History of Present Illness  Lolis Griffith is a 79 y.o. female patient here for management of lung adenocarcinoma.    History of Present Illness  The patient is a 79-year-old female who presents for evaluation of respiratory issues and cancer.    She reports satisfactory respiratory function, with no current need for antibiotics or steroids. She has discontinued the use of Breztri as per Dr. Donahue's advice and has not experienced any adverse effects from this cessation. She notes an improvement in her wheezing symptoms since stopping Breztri, which was previously attributed to sinus issues. She occasionally experiences puffiness around her eyes and mild nasal congestion, but these symptoms are transient and resolve within a day. She has not required any antibiotics for these symptoms.    She is under the care of Dr. Banks for chemotherapy. An appointment for surgery was scheduled by Dr. Dozier on 03/14/2025, but it was postponed by Dr. Banks due to the need for preoperative chemotherapy. The plan is to administer immunotherapy post-surgery every 3 weeks for a duration of 9 months. She reports a slight increase in energy levels compared to when she initially started chemotherapy. She has one remaining chemotherapy session, after which a scan will be performed.    MEDICATIONS  Discontinued: Breztri       Her history of smoking is   Tobacco Use: Low Risk  (5/6/2025)    Patient History     Smoking Tobacco Use: Never      "Smokeless Tobacco Use: Never     Passive Exposure: Never   .    Review of Systems   Constitutional:  Negative for chills, fatigue, fever, unexpected weight gain and unexpected weight loss.   HENT:  Congestion: Nasal.    Respiratory:  Positive for cough. Negative for apnea, shortness of breath and wheezing.         Negative for Hemoptysis     Cardiovascular:  Negative for chest pain, palpitations and leg swelling.   Skin:         Negative for cyanosis      Sleep: Negative for Excessive daytime sleepiness  Negative for morning headaches  Negative for Snoring    Family History   Problem Relation Age of Onset    Cancer Brother     Malig Hyperthermia Neg Hx         Social History     Socioeconomic History    Marital status:    Tobacco Use    Smoking status: Never     Passive exposure: Never    Smokeless tobacco: Never   Vaping Use    Vaping status: Never Used   Substance and Sexual Activity    Alcohol use: Not Currently    Drug use: Never    Sexual activity: Defer        Past Medical History:   Diagnosis Date    GERD (gastroesophageal reflux disease)     History of sinus problem     Hypertension     Malignant neoplasm of lower lobe of left lung     Other hyperlipidemia     Pancreas disorder     states scanned yearly ot monitor \"fatty pancreas\"        Immunization History   Administered Date(s) Administered    Arexvy (RSV, Adults 60+ yrs) 11/08/2023    COVID-19 (MODERNA) 1st,2nd,3rd Dose Monovalent 01/21/2021, 02/24/2021    COVID-19 (MODERNA) Monovalent Original Booster 11/10/2021    COVID-19 (PFIZER) 12YRS+ (COMIRNATY) 12/26/2023, 10/17/2024    COVID-19 (PFIZER) BIVALENT 12+YRS 10/19/2022    Fluzone High-Dose 65+YRS 10/15/2024    Hepatitis A 10/25/2018    Pneumococcal Conjugate 20-Valent (PCV20) 11/08/2022    Shingrix 10/30/2024         No Known Allergies       Current Outpatient Medications:     Breztri Aerosphere 160-9-4.8 MCG/ACT aerosol inhaler, Inhale 2 puffs 2 (Two) Times a Day., Disp: , Rfl:     cefepime " (MAXIPIME) 2 g injection, , Disp: , Rfl:     cetirizine (zyrTEC) 10 MG tablet, Take 1 tablet by mouth Every Night., Disp: , Rfl:     Cholecalciferol 125 MCG (5000 UT) tablet, Take 1 tablet by mouth Daily., Disp: , Rfl:     dexAMETHasone (DECADRON) 4 MG tablet, Take 1 tablet twice daily starting the day before chemo, day of chemo, and day after chemo.  Take with food., Disp: 6 tablet, Rfl: 3    Docusate Sodium 100 MG capsule, Take 1 tablet by mouth Every Night., Disp: , Rfl:     estradiol (ESTRACE) 0.1 MG/GM vaginal cream, USE AS DIRECTED 3 TIMES A WEEK, Disp: , Rfl:     ezetimibe (ZETIA) 10 MG tablet, Take 1 tablet by mouth Every Night., Disp: , Rfl:     fenofibrate (TRICOR) 145 MG tablet, Take 1 tablet by mouth Daily., Disp: , Rfl:     ferrous sulfate 324 (65 Fe) MG tablet delayed-release EC tablet, Take 1 tablet by mouth Every Night., Disp: , Rfl:     folic acid (FOLVITE) 1 MG tablet, Take 1 tablet by mouth Daily. Start at least 7 days prior to chemotherapy until at least 3 weeks after all chemotherapy., Disp: 30 tablet, Rfl: 4    guaiFENesin (Mucinex) 600 MG 12 hr tablet, Take 2 tablets by mouth 2 (Two) Times a Day for 15 days., Disp: 60 tablet, Rfl: 0    lactulose (CHRONULAC) 10 GM/15ML solution, Take 30 mL by mouth 2 (Two) Times a Day As Needed (constipation) for up to 30 days., Disp: 1500 mL, Rfl: 2    lisinopril (PRINIVIL,ZESTRIL) 5 MG tablet, Take 1 tablet by mouth Daily., Disp: 30 tablet, Rfl: 3    loratadine (CLARITIN) 10 MG tablet, Take 1 tablet by mouth Daily., Disp: , Rfl:     magnesium gluconate (MAGONATE) 500 MG tablet, Take 1 tablet by mouth Every Night., Disp: , Rfl:     montelukast (SINGULAIR) 10 MG tablet, Take 1 tablet by mouth Every Night., Disp: , Rfl:     nystatin (MYCOSTATIN) 100,000 unit/mL suspension, Take 5 mL by mouth 4 (Four) Times a Day., Disp: 473 mL, Rfl: 2    OLANZapine (zyPREXA) 5 MG tablet, Take 1 tablet by mouth Every Night. Take nightly x 4 starting night of chemotherapy., Disp:  "4 tablet, Rfl: 3    omeprazole (priLOSEC) 40 MG capsule, Take 1 capsule by mouth Daily., Disp: , Rfl:     ondansetron (ZOFRAN) 8 MG tablet, Take 1 tablet by mouth 3 (Three) Times a Day As Needed for Nausea or Vomiting., Disp: 30 tablet, Rfl: 3    simvastatin (ZOCOR) 20 MG tablet, Take 1 tablet by mouth Daily., Disp: , Rfl:     Unable to find, Take 1 each by mouth Daily. PROBIOTIC PREBIOTICS AND CRANBERRY, Disp: , Rfl:      Objective   Physical Exam  Constitutional:       General: She is not in acute distress.     Appearance: Normal appearance. She is normal weight.   HENT:      Right Ear: Hearing normal.      Left Ear: Hearing normal.      Nose: No nasal tenderness or congestion.      Mouth/Throat:      Mouth: Mucous membranes are moist. No oral lesions.   Eyes:      Extraocular Movements: Extraocular movements intact.      Pupils: Pupils are equal, round, and reactive to light.   Cardiovascular:      Rate and Rhythm: Normal rate and regular rhythm.      Pulses: Normal pulses.      Heart sounds: Normal heart sounds. No murmur heard.  Pulmonary:      Effort: Pulmonary effort is normal.      Breath sounds: Normal breath sounds. No wheezing, rhonchi or rales.   Musculoskeletal:      Right lower leg: No edema.      Left lower leg: No edema.   Skin:     General: Skin is warm and dry.      Findings: No lesion or rash.   Neurological:      General: No focal deficit present.      Mental Status: She is alert and oriented to person, place, and time.   Psychiatric:         Mood and Affect: Affect normal. Mood is not anxious or depressed.         Vital Signs:   /84 (BP Location: Left arm, Patient Position: Sitting, Cuff Size: Adult)   Pulse 68   Temp 97.6 °F (36.4 °C) (Oral)   Resp 16   Ht 165.1 cm (65\")   Wt 83.9 kg (185 lb)   SpO2 98% Comment: RA  BMI 30.79 kg/m²        Result Review :   The following data was reviewed by: ROBE Perry on 05/06/2025:  TINA          3/13/2025    09:53 4/3/2025    09:21 " 4/22/2025    08:59   CMP   Glucose 102  104  93    BUN 21  26  18    Creatinine 0.66  0.74  0.85    EGFR 90.5  83.4  70.7    Sodium 132  132  136    Potassium 3.6  4.4  4.1    Chloride 98  96  100    Calcium 8.9  9.4  9.4    Total Protein 6.6  6.8  6.5    Albumin 3.8  3.9  4.0    Globulin 2.8  2.9  2.5    Total Bilirubin 0.4  0.3  0.3    Alkaline Phosphatase 42  46  44    AST (SGOT) 20  25  27    ALT (SGPT) 11  19  16    Albumin/Globulin Ratio 1.4  1.3  1.6    BUN/Creatinine Ratio 31.8  35.1  21.2    Anion Gap 10.8  11.3  10.9      CBC w/diff          3/13/2025    09:53 4/3/2025    09:21 4/22/2025    08:59   CBC w/Diff   WBC 9.47  4.76  3.53    RBC 4.43  4.24  3.59    Hemoglobin 13.7  13.2  11.4    Hematocrit 40.3  38.3  33.7    MCV 91.0  90.3  93.9    MCH 30.9  31.1  31.8    MCHC 34.0  34.5  33.8    RDW 13.5  15.0  15.5    Platelets 251  224  117    Neutrophil Rel % 83.2  66.3  58.1    Immature Granulocyte Rel % 0.4  0.4  0.3    Lymphocyte Rel % 10.9  21.8  28.9    Monocyte Rel % 5.3  10.3  11.3    Eosinophil Rel % 0.1  0.6  1.1    Basophil Rel % 0.1  0.6  0.3      Data reviewed : Radiologic studies chest CT 1/30/2025, PET scan 2/12/2025, PFT 2/6/2025, chest xray 3/4/2025 and my last office note and Dr. Banks (oncology) last office note 4/3/2025    Procedures        Assessment and Plan    Diagnoses and all orders for this visit:    1. Malignant neoplasm of lower lobe of left lung (Primary)  Comments:  follow up with oncology as scheduled    2. Chronic cough  Comments:  stable        Assessment & Plan  1. Respiratory issues.  She reports that her breathing is okay and has not needed any antibiotics or steroids for her breathing since the last visit. She stopped using Breztri as advised by Dr. Donahue and has not noticed any difference in her symptoms. It was noted that the wheezing sound she previously experienced could have been due to mucus in her sinuses. She is advised to continue monitoring her symptoms and  report any significant changes.    2. Cancer.  She is currently undergoing chemotherapy and has one more session left. Dr. Banks has planned for surgery after the chemotherapy, followed by immunotherapy every 3 weeks for 9 months. This approach aims to shrink the tumor before surgery and to eliminate any remaining cancer cells post-surgery. She reports having more energy now compared to when she first started chemotherapy. A scan will be performed after the final chemotherapy session to assess the progress.          Follow Up   Return in about 6 months (around 11/6/2025).  Patient was given instructions and counseling regarding her condition or for health maintenance advice. Please see specific information pulled into the AVS if appropriate.

## 2025-05-06 ENCOUNTER — OFFICE VISIT (OUTPATIENT)
Dept: OTHER | Facility: HOSPITAL | Age: 80
End: 2025-05-06
Payer: MEDICARE

## 2025-05-06 ENCOUNTER — OFFICE VISIT (OUTPATIENT)
Dept: PULMONOLOGY | Facility: CLINIC | Age: 80
End: 2025-05-06
Payer: MEDICARE

## 2025-05-06 VITALS
HEART RATE: 68 BPM | HEIGHT: 65 IN | WEIGHT: 185 LBS | SYSTOLIC BLOOD PRESSURE: 173 MMHG | TEMPERATURE: 97.6 F | OXYGEN SATURATION: 98 % | DIASTOLIC BLOOD PRESSURE: 84 MMHG | RESPIRATION RATE: 16 BRPM | BODY MASS INDEX: 30.82 KG/M2

## 2025-05-06 VITALS
OXYGEN SATURATION: 100 % | WEIGHT: 191.8 LBS | DIASTOLIC BLOOD PRESSURE: 68 MMHG | BODY MASS INDEX: 31.92 KG/M2 | HEART RATE: 67 BPM | SYSTOLIC BLOOD PRESSURE: 179 MMHG | RESPIRATION RATE: 18 BRPM

## 2025-05-06 DIAGNOSIS — R05.3 CHRONIC COUGH: ICD-10-CM

## 2025-05-06 DIAGNOSIS — C34.30 NSCLC OF LOWER LOBE: Primary | ICD-10-CM

## 2025-05-06 DIAGNOSIS — C34.32 MALIGNANT NEOPLASM OF LOWER LOBE OF LEFT LUNG: Primary | ICD-10-CM

## 2025-05-06 DIAGNOSIS — C34.12 MALIGNANT NEOPLASM OF UPPER LOBE, LEFT BRONCHUS OR LUNG: ICD-10-CM

## 2025-05-06 PROCEDURE — 3077F SYST BP >= 140 MM HG: CPT | Performed by: NURSE PRACTITIONER

## 2025-05-06 PROCEDURE — 99213 OFFICE O/P EST LOW 20 MIN: CPT | Performed by: NURSE PRACTITIONER

## 2025-05-06 PROCEDURE — 3079F DIAST BP 80-89 MM HG: CPT | Performed by: NURSE PRACTITIONER

## 2025-05-06 PROCEDURE — 1159F MED LIST DOCD IN RCRD: CPT | Performed by: NURSE PRACTITIONER

## 2025-05-06 PROCEDURE — 1160F RVW MEDS BY RX/DR IN RCRD: CPT | Performed by: NURSE PRACTITIONER

## 2025-05-06 PROCEDURE — 3078F DIAST BP <80 MM HG: CPT | Performed by: NURSE PRACTITIONER

## 2025-05-06 RX ORDER — CEFEPIME HYDROCHLORIDE 2 G/1
INJECTION, POWDER, FOR SOLUTION INTRAVENOUS
COMMUNITY
Start: 2025-02-11

## 2025-05-06 NOTE — PROGRESS NOTES
"Chief Complaint  NSCLC of lower lobe (NO IMAGING, STRESS 3/3- ONC. DR. ROSA)    Subjective        Lolis Griffith presents to [facility]  History of Present Illness    Ms. 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  She has one more chemotherapy treatment scheduled for 5/13/25. She has completed PFTs and stress test. She presents today feeling well.     Objective   Vital Signs:  /68   Pulse 67   Resp 18   Wt 87 kg (191 lb 12.8 oz)   SpO2 100%   BMI 31.92 kg/m²   Estimated body mass index is 31.92 kg/m² as calculated from the following:    Height as of an earlier encounter on 5/6/25: 165.1 cm (65\").    Weight as of this encounter: 87 kg (191 lb 12.8 oz).          Physical Exam  Vitals and nursing note reviewed.   Constitutional:       Appearance: She is well-developed.   HENT:      Head: Normocephalic and atraumatic.      Nose: Nose normal.   Eyes:      Conjunctiva/sclera: Conjunctivae normal.   Cardiovascular:      Rate and Rhythm: Normal rate.   Pulmonary:      Effort: Pulmonary effort is normal.   Abdominal:      Palpations: Abdomen is soft.   Musculoskeletal:      Cervical back: Neck supple.   Skin:     General: Skin is warm and dry.   Neurological:      Mental Status: She is alert and oriented to person, place, and time.   Psychiatric:         Behavior: Behavior normal.         Thought Content: Thought content normal.         Judgment: Judgment normal.        Result Review :    Stress test 03/03/2025: Low risk, abnormal, regadenoson myocardial perfusion imaging study.       Brain MRI 2/6/2025 without evidence of metastatic disease.    Pulmonary function studies demonstrate an FEV1 of 2.49 or 109% predicted and a DLCO of 22 or 100%    Pathology 1/30/25: Left lower lobe nodule adenocarcinoma     Assessment and Plan   Diagnoses and all orders for this visit:    1. NSCLC of lower lobe (Primary)  -     NM " PET/CT Skull Base to Mid Thigh; Future  -     Case request    2. Malignant neoplasm of upper lobe, left bronchus or lung  -     NM PET/CT Skull Base to Mid Thigh; Future  -     Case request      Ms. 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  She is scheduled for her fourth and final round of chemotherapy on 5/15/2025.  Based on her pulmonary function studies, she would be a candidate for resection.  We will go ahead and schedule her for surgery in early June 2025.  She will see Dr. Dozier in the office prior to her surgery.  Preoperative orders have been placed and she will follow-up with Dr. Dozier on 6/3/2025.         Follow Up   Return in about 1 month (around 6/6/2025) for Next scheduled follow up.  Patient was given instructions and counseling regarding her condition or for health maintenance advice. Please see specific information pulled into the AVS if appropriate.       I spent 52 minutes caring for Lolis on this date of service. This time includes time spent by me in the following activities:preparing for the visit, reviewing tests, obtaining and/or reviewing a separately obtained history, performing a medically appropriate examination and/or evaluation , counseling and educating the patient/family/caregiver, ordering medications, tests, or procedures, referring and communicating with other health care professionals , documenting information in the medical record, and independently interpreting results and communicating that information with the patient/family/caregiver

## 2025-05-13 ENCOUNTER — HOSPITAL ENCOUNTER (OUTPATIENT)
Dept: ONCOLOGY | Facility: HOSPITAL | Age: 80
Discharge: HOME | End: 2025-05-13
Payer: MEDICARE

## 2025-05-13 ENCOUNTER — OFFICE VISIT (OUTPATIENT)
Dept: ONCOLOGY | Facility: HOSPITAL | Age: 80
End: 2025-05-13
Payer: MEDICARE

## 2025-05-13 VITALS
OXYGEN SATURATION: 100 % | DIASTOLIC BLOOD PRESSURE: 71 MMHG | SYSTOLIC BLOOD PRESSURE: 166 MMHG | TEMPERATURE: 97.1 F | RESPIRATION RATE: 18 BRPM | HEIGHT: 65 IN | BODY MASS INDEX: 31.81 KG/M2 | WEIGHT: 190.92 LBS | HEART RATE: 64 BPM

## 2025-05-13 DIAGNOSIS — T45.1X5A CHEMOTHERAPY-INDUCED FATIGUE: ICD-10-CM

## 2025-05-13 DIAGNOSIS — J15.1 PNEUMONIA DUE TO PSEUDOMONAS SPECIES, UNSPECIFIED LATERALITY, UNSPECIFIED PART OF LUNG: ICD-10-CM

## 2025-05-13 DIAGNOSIS — C34.32 MALIGNANT NEOPLASM OF LOWER LOBE OF LEFT LUNG: Primary | ICD-10-CM

## 2025-05-13 DIAGNOSIS — T45.1X5A CHEMOTHERAPY-INDUCED THROMBOCYTOPENIA: ICD-10-CM

## 2025-05-13 DIAGNOSIS — R53.83 CHEMOTHERAPY-INDUCED FATIGUE: ICD-10-CM

## 2025-05-13 DIAGNOSIS — Z45.2 ENCOUNTER FOR ADJUSTMENT OR MANAGEMENT OF VASCULAR ACCESS DEVICE: ICD-10-CM

## 2025-05-13 DIAGNOSIS — D69.59 CHEMOTHERAPY-INDUCED THROMBOCYTOPENIA: ICD-10-CM

## 2025-05-13 DIAGNOSIS — Z79.899 ENCOUNTER FOR LONG-TERM CURRENT USE OF HIGH RISK MEDICATION: ICD-10-CM

## 2025-05-13 DIAGNOSIS — K59.00 CONSTIPATION, UNSPECIFIED CONSTIPATION TYPE: ICD-10-CM

## 2025-05-13 LAB
ALBUMIN SERPL-MCNC: 4.1 G/DL (ref 3.5–5.2)
ALBUMIN/GLOB SERPL: 1.8 G/DL
ALP SERPL-CCNC: 41 U/L (ref 39–117)
ALT SERPL W P-5'-P-CCNC: 15 U/L (ref 1–33)
ANION GAP SERPL CALCULATED.3IONS-SCNC: 12.2 MMOL/L (ref 5–15)
AST SERPL-CCNC: 26 U/L (ref 1–32)
BASOPHILS # BLD AUTO: 0 10*3/MM3 (ref 0–0.2)
BASOPHILS NFR BLD AUTO: 0 % (ref 0–1.5)
BILIRUB SERPL-MCNC: 0.4 MG/DL (ref 0–1.2)
BUN SERPL-MCNC: 28 MG/DL (ref 8–23)
BUN/CREAT SERPL: 24.6 (ref 7–25)
CALCIUM SPEC-SCNC: 9.4 MG/DL (ref 8.6–10.5)
CHLORIDE SERPL-SCNC: 105 MMOL/L (ref 98–107)
CO2 SERPL-SCNC: 23.8 MMOL/L (ref 22–29)
CREAT SERPL-MCNC: 1.14 MG/DL (ref 0.57–1)
DEPRECATED RDW RBC AUTO: 52.6 FL (ref 37–54)
EGFRCR SERPLBLD CKD-EPI 2021: 49.7 ML/MIN/1.73
EOSINOPHIL # BLD AUTO: 0.03 10*3/MM3 (ref 0–0.4)
EOSINOPHIL NFR BLD AUTO: 1 % (ref 0.3–6.2)
ERYTHROCYTE [DISTWIDTH] IN BLOOD BY AUTOMATED COUNT: 16.7 % (ref 12.3–15.4)
GLOBULIN UR ELPH-MCNC: 2.3 GM/DL
GLUCOSE SERPL-MCNC: 102 MG/DL (ref 65–99)
HCT VFR BLD AUTO: 28.6 % (ref 34–46.6)
HGB BLD-MCNC: 9.6 G/DL (ref 12–15.9)
IMM GRANULOCYTES # BLD AUTO: 0.01 10*3/MM3 (ref 0–0.05)
IMM GRANULOCYTES NFR BLD AUTO: 0.3 % (ref 0–0.5)
LYMPHOCYTES # BLD AUTO: 1.05 10*3/MM3 (ref 0.7–3.1)
LYMPHOCYTES NFR BLD AUTO: 34.7 % (ref 19.6–45.3)
MCH RBC QN AUTO: 31.9 PG (ref 26.6–33)
MCHC RBC AUTO-ENTMCNC: 33.6 G/DL (ref 31.5–35.7)
MCV RBC AUTO: 95 FL (ref 79–97)
MONOCYTES # BLD AUTO: 0.4 10*3/MM3 (ref 0.1–0.9)
MONOCYTES NFR BLD AUTO: 13.2 % (ref 5–12)
NEUTROPHILS NFR BLD AUTO: 1.54 10*3/MM3 (ref 1.7–7)
NEUTROPHILS NFR BLD AUTO: 50.8 % (ref 42.7–76)
NRBC BLD AUTO-RTO: 0 /100 WBC (ref 0–0.2)
PLATELET # BLD AUTO: 76 10*3/MM3 (ref 140–450)
PMV BLD AUTO: 8.9 FL (ref 6–12)
POTASSIUM SERPL-SCNC: 3.7 MMOL/L (ref 3.5–5.2)
PROT SERPL-MCNC: 6.4 G/DL (ref 6–8.5)
RBC # BLD AUTO: 3.01 10*6/MM3 (ref 3.77–5.28)
SODIUM SERPL-SCNC: 141 MMOL/L (ref 136–145)
WBC NRBC COR # BLD AUTO: 3.03 10*3/MM3 (ref 3.4–10.8)

## 2025-05-13 PROCEDURE — 80053 COMPREHEN METABOLIC PANEL: CPT | Performed by: INTERNAL MEDICINE

## 2025-05-13 PROCEDURE — 36591 DRAW BLOOD OFF VENOUS DEVICE: CPT

## 2025-05-13 PROCEDURE — 25010000002 HEPARIN LOCK FLUSH PER 10 UNITS: Performed by: INTERNAL MEDICINE

## 2025-05-13 PROCEDURE — 85025 COMPLETE CBC W/AUTO DIFF WBC: CPT | Performed by: INTERNAL MEDICINE

## 2025-05-13 RX ORDER — HEPARIN SODIUM (PORCINE) LOCK FLUSH IV SOLN 100 UNIT/ML 100 UNIT/ML
500 SOLUTION INTRAVENOUS AS NEEDED
Status: CANCELLED | OUTPATIENT
Start: 2025-05-15

## 2025-05-13 RX ORDER — PALONOSETRON 0.05 MG/ML
0.25 INJECTION, SOLUTION INTRAVENOUS ONCE
Status: CANCELLED | OUTPATIENT
Start: 2025-05-15

## 2025-05-13 RX ORDER — HEPARIN SODIUM (PORCINE) LOCK FLUSH IV SOLN 100 UNIT/ML 100 UNIT/ML
500 SOLUTION INTRAVENOUS AS NEEDED
Status: DISCONTINUED | OUTPATIENT
Start: 2025-05-13 | End: 2025-05-14 | Stop reason: HOSPADM

## 2025-05-13 RX ORDER — HYDROCORTISONE SODIUM SUCCINATE 100 MG/2ML
100 INJECTION INTRAMUSCULAR; INTRAVENOUS AS NEEDED
Status: CANCELLED | OUTPATIENT
Start: 2025-05-15

## 2025-05-13 RX ORDER — FAMOTIDINE 10 MG/ML
20 INJECTION, SOLUTION INTRAVENOUS AS NEEDED
Status: CANCELLED | OUTPATIENT
Start: 2025-05-15

## 2025-05-13 RX ORDER — SODIUM CHLORIDE 9 MG/ML
20 INJECTION, SOLUTION INTRAVENOUS ONCE
Status: CANCELLED | OUTPATIENT
Start: 2025-05-15

## 2025-05-13 RX ORDER — SODIUM CHLORIDE 0.9 % (FLUSH) 0.9 %
20 SYRINGE (ML) INJECTION AS NEEDED
Status: CANCELLED | OUTPATIENT
Start: 2025-05-13

## 2025-05-13 RX ORDER — DIPHENHYDRAMINE HYDROCHLORIDE 50 MG/ML
50 INJECTION, SOLUTION INTRAMUSCULAR; INTRAVENOUS AS NEEDED
Status: CANCELLED | OUTPATIENT
Start: 2025-05-15

## 2025-05-13 RX ORDER — SODIUM CHLORIDE 0.9 % (FLUSH) 0.9 %
20 SYRINGE (ML) INJECTION AS NEEDED
Status: DISCONTINUED | OUTPATIENT
Start: 2025-05-13 | End: 2025-05-14 | Stop reason: HOSPADM

## 2025-05-13 RX ADMIN — Medication 20 ML: at 10:36

## 2025-05-13 RX ADMIN — HEPARIN 500 UNITS: 100 SYRINGE at 10:36

## 2025-05-13 NOTE — PROGRESS NOTES
Chief Complaint  FOLLOW UP 1    Provider, No Known  Mary Cruz APRN Subjective Darlene B Nacho presents to [facility] for lung adenocarcinoma    Patient is a very pleasant 79-year-old female with past medical history of hypertension, dyslipidemia, CKD who presents for lung adenocarcinoma.  Patient has CT chest done for lung nodule on 1/30/2025.  Shows stable irregular 1.6 cm left lower lobe nodule.  This showed stable mild mediastinal adenopathy which could be reactive or metastatic.  Ion bronchoscopy done same day showed left lower lobe biopsy positive for adenocarcinoma lipidic pattern.  EGFR, ROS, ALK all negative. PDL1 positive at 60%.  BAL culture positive for Pseudomonas.  She was started on IV cefepime for 10 days via PICC.  She has completed this.  MRI brain on 2/6/25 was negative for intracranial disease.  PET scan from 2/12/2025 showed a 1.6 cm left lower lobe nodule that was FDG avid.  FDG avid mediastinal and left hilar lymphadenopathy also shown.  Index subcarinal lymph nodes measure 2.5 cm with SUV max 6.6.  Also had a left infrahilar lymph node positive.      Interval History  Patient presents for follow-up today.  She has been started on chemotherapy with carboplatin pemetrexed and pembrolizumab as of 3/13/25. She is due for Cycle 4. This is final cycle of neoadjuvant treatment.  Constipation has been better managed on prune juice and Metamucil.  She has not started lactulose.  Energy level remains decreased.  She does report more swelling in her ankles mainly her left ankle.  It is improved after she gets up in the morning.  Denies any fevers or chills.  Has occasional nausea that is treated with antiemetic.  No vomiting.  No diarrhea reported.  Remains on folic acid.  Has PET scan scheduled for end of the month.  Due to see Dr. Dozier after. Ready for treatment Thursday. Plts low at 76K.       Oncology/Hematology History Overview Note    1/30/25: CT Chest done for lung nodule: Stable irregular 1.6 cm left lower lobe nodule, potentially neoplastic. Per Fleischner guidelines this could be assessed by short-term follow-up 3-month chest CT, PET/CT and/or biopsy. Recommend pulmonary consultation for management. Infectious/inflammatory lower lobe predominant tree-in-bud nodularity slightly waxing and waning since previous comparison. Findings could reflect bronchiolitis, microaspiration or atypical infection. Recommend attention on follow-up imaging.   Stable mild mediastinal adenopathy which could be reactive or metastatic pending etiology of the left lower lobe nodule. Exophytic indeterminate right upper pole renal lesion measuring 4.1 cm. At this size nonemergent renal ultrasound is recommended to assess if lesion is solid or cystic. Aortic and severe coronary atherosclerotic disease.    1/30/25: ION bronchoscopy with biopsy: BAL culture positive for >100K CFU/mL of Pseudomonas. Left lower lobe biopsy positive for adenocarcinoma, lepidic pattern, lung primary. EGFR, ROS, ALK negative. PDL1 positive at 60%.     Started on IV antibiotic (Cefepime) x 10 days for lung pseudomonas per pulmonary team.     2/6/25: MRI brain negative for intracranial disease    2/12/25: NM PET: A 1.6 cm left lower lobe nodule is FDG avid with SUV max 7.4. There is mild bibasilar atelectasis. There is FDG-avid mediastinal and left hilar lymphadenopathy. An index subcarinal lymph node on image 85 measures 2.5 x 1.3 cm with SUV max 6.6. An index left infrahilar lymph node on image 94 has SUV max 9.4, with note that size measurement is difficult on this unenhanced exam. No metastatic disease outside of chest seen.     3/13/25: C1D1 neoadjuvant Carboplatin/Pemetrexed/Pembrolizumab  4/03/25: C2D1     Malignant neoplasm of lower lobe of left lung   2/24/2025 Initial Diagnosis    Malignant neoplasm of lower lobe of left lung     2/24/2025 Cancer Staged    Staging form: Lung, AJCC  V9  - Clinical: Stage IIB (cT1b, cN2a, cM0) - Signed by Reinaldo Banks MD on 2/24/2025     3/13/2025 -  Chemotherapy    OP LUNG Pembrolizumab 200 mg / Pemetrexed / Carboplatin AUC=5           Review of Systems   Constitutional:  Positive for fatigue. Negative for appetite change, diaphoresis, fever, unexpected weight gain and unexpected weight loss.   HENT:  Negative for hearing loss, mouth sores, sore throat, swollen glands, trouble swallowing and voice change.    Eyes:  Negative for blurred vision.   Respiratory:  Negative for cough, shortness of breath and wheezing.    Cardiovascular:  Negative for chest pain and palpitations.   Gastrointestinal:  Negative for abdominal pain, blood in stool, constipation, diarrhea, nausea and vomiting.   Endocrine: Negative for cold intolerance and heat intolerance.   Genitourinary:  Negative for difficulty urinating, dysuria, frequency, hematuria and urinary incontinence.   Musculoskeletal:  Negative for arthralgias, back pain and myalgias.        Swollen ankles    Skin:  Positive for dry skin and rash. Negative for skin lesions and wound.   Neurological:  Negative for dizziness, seizures, weakness, numbness and headache.   Hematological:  Does not bruise/bleed easily.   Psychiatric/Behavioral:  Negative for depressed mood. The patient is not nervous/anxious.    All other systems reviewed and are negative.    Current Outpatient Medications on File Prior to Visit   Medication Sig Dispense Refill    Breztri Aerosphere 160-9-4.8 MCG/ACT aerosol inhaler Inhale 2 puffs 2 (Two) Times a Day.      cefepime (MAXIPIME) 2 g injection       cetirizine (zyrTEC) 10 MG tablet Take 1 tablet by mouth Every Night.      Cholecalciferol 125 MCG (5000 UT) tablet Take 1 tablet by mouth Daily.      dexAMETHasone (DECADRON) 4 MG tablet Take 1 tablet twice daily starting the day before chemo, day of chemo, and day after chemo.  Take with food. 6 tablet 3    Docusate Sodium 100 MG capsule Take 1  "tablet by mouth Every Night.      estradiol (ESTRACE) 0.1 MG/GM vaginal cream USE AS DIRECTED 3 TIMES A WEEK      ezetimibe (ZETIA) 10 MG tablet Take 1 tablet by mouth Every Night.      fenofibrate (TRICOR) 145 MG tablet Take 1 tablet by mouth Daily.      ferrous sulfate 324 (65 Fe) MG tablet delayed-release EC tablet Take 1 tablet by mouth Every Night.      folic acid (FOLVITE) 1 MG tablet Take 1 tablet by mouth Daily. Start at least 7 days prior to chemotherapy until at least 3 weeks after all chemotherapy. 30 tablet 4    lactulose (CHRONULAC) 10 GM/15ML solution Take 30 mL by mouth 2 (Two) Times a Day As Needed (constipation) for up to 30 days. 1500 mL 2    lisinopril (PRINIVIL,ZESTRIL) 5 MG tablet Take 1 tablet by mouth Daily. 30 tablet 3    loratadine (CLARITIN) 10 MG tablet Take 1 tablet by mouth Daily.      magnesium gluconate (MAGONATE) 500 MG tablet Take 1 tablet by mouth Every Night.      montelukast (SINGULAIR) 10 MG tablet Take 1 tablet by mouth Every Night.      nystatin (MYCOSTATIN) 100,000 unit/mL suspension Take 5 mL by mouth 4 (Four) Times a Day. 473 mL 2    OLANZapine (zyPREXA) 5 MG tablet Take 1 tablet by mouth Every Night. Take nightly x 4 starting night of chemotherapy. 4 tablet 3    omeprazole (priLOSEC) 40 MG capsule Take 1 capsule by mouth Daily.      ondansetron (ZOFRAN) 8 MG tablet Take 1 tablet by mouth 3 (Three) Times a Day As Needed for Nausea or Vomiting. 30 tablet 3    simvastatin (ZOCOR) 20 MG tablet Take 1 tablet by mouth Daily.      Unable to find Take 1 each by mouth Daily. PROBIOTIC PREBIOTICS AND CRANBERRY       No current facility-administered medications on file prior to visit.       No Known Allergies  Past Medical History:   Diagnosis Date    GERD (gastroesophageal reflux disease)     History of sinus problem     Hypertension     Malignant neoplasm of lower lobe of left lung     Other hyperlipidemia     Pancreas disorder     states scanned yearly ot monitor \"fatty pancreas\" " "    Past Surgical History:   Procedure Laterality Date    BLADDER REPAIR      BRONCHOSCOPY WITH ION ROBOTIC ASSIST Left 01/30/2025    Procedure: BRONCHOSCOPY WITH ION ROBOT: BAL, REBUS, NEEDLE ASPIRATE, BIOPSIES insertion of lighted robot navigated instrument to view inside the lung;  Surgeon: Stevie Carson DO;  Location: [facility] MAIN OR;  Service: Robotics - Pulmonary;  Laterality: Left;    LAPAROSCOPIC CHOLECYSTECTOMY      PARATHYROIDECTOMY      RECTAL PROLAPSE REPAIR  2021    TOTAL ABDOMINAL HYSTERECTOMY WITH SALPINGO OOPHORECTOMY      TOTAL HIP ARTHROPLASTY Left     VENOUS ACCESS DEVICE (PORT) INSERTION N/A 3/4/2025    Procedure: INSERTION VENOUS ACCESS DEVICE: Placement of a port in the tissue under the skin at your upper chest with a tube attached to it that goes into your vein;  Surgeon: Abhinav Martin MD;  [place] [facility] [place] [facility] [place]  Service: General;  Laterality: N/A;     Social History     Socioeconomic History    Marital status:    Tobacco Use    Smoking status: Never     Passive exposure: Never    Smokeless tobacco: Never   Vaping Use    Vaping status: Never Used   Substance and Sexual Activity    Alcohol use: Not Currently    Drug use: Never    Sexual activity: Defer     Family History   Problem Relation Age of Onset    Cancer Brother     Malig Hyperthermia Neg Hx        Objective   Physical Exam  Well appearing patient in no acute distress on RA  Anicteric sclerae, no rash on exposed skin  Respirations non-labored  Awake, alert, and oriented x 4. Speech intact. No gross neurologic deficit  Appropriate mood and affect    Vitals:    05/13/25 1052   BP: 166/71   Pulse: 64   Resp: 18   Temp: 97.1 °F (36.2 °C)   TempSrc: Temporal   SpO2: 100%   Weight: 86.6 kg (190 lb 14.7 oz)   Height: 165.1 cm (65\")   PainSc: 0-No pain                   PHQ-9 Total Score:           Result Review :   The following data was reviewed by: Reinaldo Banks MD on 05/13/25:  Lab Results   Component Value " Date    HGB 9.6 (L) 05/13/2025    HCT 28.6 (L) 05/13/2025    MCV 95.0 05/13/2025    PLT 76 (L) 05/13/2025    WBC 3.03 (L) 05/13/2025    NEUTROABS 1.54 (L) 05/13/2025    LYMPHSABS 1.05 05/13/2025    MONOSABS 0.40 05/13/2025    EOSABS 0.03 05/13/2025    BASOSABS 0.00 05/13/2025     Lab Results   Component Value Date    GLUCOSE 102 (H) 05/13/2025    BUN 28 (H) 05/13/2025    CREATININE 1.14 (H) 05/13/2025     05/13/2025    K 3.7 05/13/2025     05/13/2025    CO2 23.8 05/13/2025    CALCIUM 9.4 05/13/2025    PROTEINTOT 6.4 05/13/2025    ALBUMIN 4.1 05/13/2025    BILITOT 0.4 05/13/2025    ALKPHOS 41 05/13/2025    AST 26 05/13/2025    ALT 15 05/13/2025     Lab Results   Component Value Date    PHOS 3.1 08/06/2024    FREET4 1.12 04/22/2025    TSH 1.680 04/22/2025     Labs personally reviewed. CBC with low platelets. Creatinine normal. Na low. Cl low.       Mammo Screening Digital Tomosynthesis Bilateral With CAD  Result Date: 4/29/2025  No mammographic evidence of malignancy.  Recommend annual screening mammography.  BI-RADS ASSESSMENT: Category 1: Negative  Note: It has been reported that there is approximately a 15% false negative rate in mammography.  Therefore, management of a palpable abnormality should not be deferred because of a negative mammogram.  4/29/2025 5:09 PM by Paul Jack on Workstation: [email or web address]            Assessment and Plan    Diagnoses and all orders for this visit:    1. Malignant neoplasm of lower lobe of left lung (Primary)    2. Chemotherapy-induced thrombocytopenia    3. Constipation, unspecified constipation type    4. Chemotherapy-induced fatigue    Other orders  -     sodium chloride 0.9 % infusion  -     Pembrolizumab (KEYTRUDA) 200 mg in sodium chloride 0.9 % 58 mL chemo IVPB  -     palonosetron (ALOXI) injection 0.25 mg  -     fosaprepitant (EMEND) 150 mg/100mL NS  -     PEMEtrexed (ALIMTA) 950 mg in sodium chloride 0.9 % 138 mL chemo IVPB  -     CARBOplatin (PARAPLATIN) 500 mg  in sodium chloride 0.9 % 300 mL chemo IVPB  -     Hydrocortisone Sod Suc (PF) (Solu-CORTEF) injection 100 mg  -     diphenhydrAMINE (BENADRYL) injection 50 mg  -     famotidine (PEPCID) injection 20 mg          Left lung adenocarcinoma  Clinical T1cN2a based off PET.  Lymph node biopsies have not been performed.  Patient did have Pseudomonas infection but these lymph nodes appear to be metastatic.  Prefer to treat patient with neoadjuvant chemoimmunotherapy followed by surgery.  Have personally discussed patient with thoracic surgeon Dr. Dozier, who is in agreement.  Plan will be carboplatin, pemetrexed combined with pembrolizumab given every 3 weeks for 4 cycles   Dexamethasone premed.  B12 and folic acid while pemetrexed also will be provided. Port has been placed.     C1D1 Carbo/Pemetrexed/Pembrolizumab neoadjuvant 3/13/25. Tolerated well    C4D1 Carbo/Pemetrexed/Pembrolizumab 5/13/25. Continue same dose. Labs appropriate to proceed outside of low plts - ok to treat placed.    Repeat PET after C4 has been scheduled - will f/u after.     Constipation  Managed with prune juice and metamucil. Lactulose not started. Miralax has not been helpful.     LE edema  Rec leg elevation    Chemo fatigue  Encouraged activity    Hyponatremia  Increase hydration.            This is an acute or chronic illness that poses a threat to life or bodily function. The above treatment plan involves a high risk of complications and/or mortality of patient management.        I spent 20 minutes caring for Lolis on this date of service. This time includes time spent by me in the following activities:preparing for the visit, reviewing tests, obtaining and/or reviewing a separately obtained history, performing a medically appropriate examination and/or evaluation , counseling and educating the patient/family/caregiver, ordering medications, tests, or procedures, referring and communicating with other health care professionals , documenting  information in the medical record, independently interpreting results and communicating that information with the patient/family/caregiver, and care coordination    Patient Follow Up: after PET  Patient was given instructions and counseling regarding her condition or for health maintenance advice. Please see specific information pulled into the AVS if appropriate.

## 2025-05-15 ENCOUNTER — HOSPITAL ENCOUNTER (OUTPATIENT)
Dept: ONCOLOGY | Facility: HOSPITAL | Age: 80
Discharge: HOME | End: 2025-05-15
Payer: MEDICARE

## 2025-05-15 VITALS
WEIGHT: 189.38 LBS | RESPIRATION RATE: 16 BRPM | DIASTOLIC BLOOD PRESSURE: 66 MMHG | TEMPERATURE: 98 F | SYSTOLIC BLOOD PRESSURE: 144 MMHG | BODY MASS INDEX: 31.51 KG/M2 | OXYGEN SATURATION: 98 % | HEART RATE: 60 BPM

## 2025-05-15 DIAGNOSIS — Z45.2 ENCOUNTER FOR ADJUSTMENT OR MANAGEMENT OF VASCULAR ACCESS DEVICE: ICD-10-CM

## 2025-05-15 DIAGNOSIS — C34.32 MALIGNANT NEOPLASM OF LOWER LOBE OF LEFT LUNG: Primary | ICD-10-CM

## 2025-05-15 DIAGNOSIS — Z79.899 ENCOUNTER FOR LONG-TERM CURRENT USE OF HIGH RISK MEDICATION: ICD-10-CM

## 2025-05-15 DIAGNOSIS — J15.1 PNEUMONIA DUE TO PSEUDOMONAS SPECIES, UNSPECIFIED LATERALITY, UNSPECIFIED PART OF LUNG: ICD-10-CM

## 2025-05-15 PROCEDURE — 25810000003 SODIUM CHLORIDE 0.9 % SOLUTION 250 ML FLEX CONT: Performed by: INTERNAL MEDICINE

## 2025-05-15 PROCEDURE — 25010000002 HEPARIN LOCK FLUSH PER 10 UNITS: Performed by: INTERNAL MEDICINE

## 2025-05-15 PROCEDURE — 25010000002 PALONOSETRON PER 25 MCG: Performed by: INTERNAL MEDICINE

## 2025-05-15 PROCEDURE — 25010000002 CARBOPLATIN PER 50 MG: Performed by: INTERNAL MEDICINE

## 2025-05-15 PROCEDURE — 96413 CHEMO IV INFUSION 1 HR: CPT

## 2025-05-15 PROCEDURE — 96417 CHEMO IV INFUS EACH ADDL SEQ: CPT

## 2025-05-15 PROCEDURE — 25810000003 SODIUM CHLORIDE 0.9 % SOLUTION: Performed by: INTERNAL MEDICINE

## 2025-05-15 PROCEDURE — 25010000002 PEMETREXED PER 10 MG: Performed by: INTERNAL MEDICINE

## 2025-05-15 PROCEDURE — 96375 TX/PRO/DX INJ NEW DRUG ADDON: CPT

## 2025-05-15 PROCEDURE — 25010000002 PEMETREXED 100 MG RECONSTITUTED SOLUTION 1 EACH VIAL: Performed by: INTERNAL MEDICINE

## 2025-05-15 PROCEDURE — 25010000002 PEMBROLIZUMAB 100 MG/4ML SOLUTION 4 ML VIAL: Performed by: INTERNAL MEDICINE

## 2025-05-15 PROCEDURE — 96367 TX/PROPH/DG ADDL SEQ IV INF: CPT

## 2025-05-15 PROCEDURE — 25010000002 FOSAPREPITANT PER 1 MG: Performed by: INTERNAL MEDICINE

## 2025-05-15 PROCEDURE — 96411 CHEMO IV PUSH ADDL DRUG: CPT

## 2025-05-15 RX ORDER — SODIUM CHLORIDE 0.9 % (FLUSH) 0.9 %
20 SYRINGE (ML) INJECTION AS NEEDED
Status: DISCONTINUED | OUTPATIENT
Start: 2025-05-15 | End: 2025-05-16 | Stop reason: HOSPADM

## 2025-05-15 RX ORDER — HEPARIN SODIUM (PORCINE) LOCK FLUSH IV SOLN 100 UNIT/ML 100 UNIT/ML
500 SOLUTION INTRAVENOUS AS NEEDED
Status: DISCONTINUED | OUTPATIENT
Start: 2025-05-15 | End: 2025-05-16 | Stop reason: HOSPADM

## 2025-05-15 RX ORDER — PALONOSETRON 0.05 MG/ML
0.25 INJECTION, SOLUTION INTRAVENOUS ONCE
Status: COMPLETED | OUTPATIENT
Start: 2025-05-15 | End: 2025-05-15

## 2025-05-15 RX ORDER — SODIUM CHLORIDE 0.9 % (FLUSH) 0.9 %
20 SYRINGE (ML) INJECTION AS NEEDED
OUTPATIENT
Start: 2025-05-15

## 2025-05-15 RX ORDER — HEPARIN SODIUM (PORCINE) LOCK FLUSH IV SOLN 100 UNIT/ML 100 UNIT/ML
500 SOLUTION INTRAVENOUS AS NEEDED
OUTPATIENT
Start: 2025-05-15

## 2025-05-15 RX ORDER — SODIUM CHLORIDE 9 MG/ML
20 INJECTION, SOLUTION INTRAVENOUS ONCE
Status: COMPLETED | OUTPATIENT
Start: 2025-05-15 | End: 2025-05-15

## 2025-05-15 RX ADMIN — Medication 20 ML: at 11:49

## 2025-05-15 RX ADMIN — PEMETREXED DISODIUM 900 MG: 500 INJECTION, POWDER, LYOPHILIZED, FOR SOLUTION INTRAVENOUS at 10:49

## 2025-05-15 RX ADMIN — HEPARIN 500 UNITS: 100 SYRINGE at 11:50

## 2025-05-15 RX ADMIN — CARBOPLATIN 410 MG: 10 INJECTION INTRAVENOUS at 11:04

## 2025-05-15 RX ADMIN — FOSAPREPITANT 150 MG: 150 INJECTION, POWDER, LYOPHILIZED, FOR SOLUTION INTRAVENOUS at 09:33

## 2025-05-15 RX ADMIN — SODIUM CHLORIDE 200 MG: 9 INJECTION, SOLUTION INTRAVENOUS at 10:09

## 2025-05-15 RX ADMIN — SODIUM CHLORIDE 20 ML/HR: 9 INJECTION, SOLUTION INTRAVENOUS at 09:31

## 2025-05-15 RX ADMIN — PALONOSETRON HYDROCHLORIDE 0.25 MG: 0.25 INJECTION, SOLUTION INTRAVENOUS at 09:32

## 2025-05-28 ENCOUNTER — HOSPITAL ENCOUNTER (OUTPATIENT)
Dept: PET IMAGING | Facility: HOSPITAL | Age: 80
Discharge: HOME | End: 2025-05-28
Payer: MEDICARE

## 2025-05-28 DIAGNOSIS — C34.30 NSCLC OF LOWER LOBE: ICD-10-CM

## 2025-05-28 DIAGNOSIS — C34.12 MALIGNANT NEOPLASM OF UPPER LOBE, LEFT BRONCHUS OR LUNG: ICD-10-CM

## 2025-05-28 PROCEDURE — 34310000005 FLUDEOXYGLUCOSE F18 SOLUTION: Performed by: NURSE PRACTITIONER

## 2025-05-28 PROCEDURE — 78815 PET IMAGE W/CT SKULL-THIGH: CPT

## 2025-05-28 PROCEDURE — A9552 F18 FDG: HCPCS | Performed by: NURSE PRACTITIONER

## 2025-05-28 RX ADMIN — FLUDEOXYGLUCOSE F 18 1 DOSE: 200 INJECTION, SOLUTION INTRAVENOUS at 11:27

## 2025-05-30 ENCOUNTER — PRE-ADMISSION TESTING (OUTPATIENT)
Dept: PREADMISSION TESTING | Facility: HOSPITAL | Age: 80
End: 2025-05-30
Payer: MEDICARE

## 2025-05-30 VITALS
BODY MASS INDEX: 30.7 KG/M2 | RESPIRATION RATE: 18 BRPM | SYSTOLIC BLOOD PRESSURE: 145 MMHG | OXYGEN SATURATION: 95 % | TEMPERATURE: 97.4 F | HEIGHT: 66 IN | HEART RATE: 88 BPM | DIASTOLIC BLOOD PRESSURE: 70 MMHG | WEIGHT: 191 LBS

## 2025-05-30 DIAGNOSIS — C34.92 ADENOCARCINOMA, LUNG, LEFT: ICD-10-CM

## 2025-05-30 DIAGNOSIS — C34.32 MALIGNANT NEOPLASM OF LOWER LOBE OF LEFT LUNG: Primary | ICD-10-CM

## 2025-05-30 LAB
ANION GAP SERPL CALCULATED.3IONS-SCNC: 11.9 MMOL/L (ref 5–15)
BASOPHILS # BLD AUTO: 0 10*3/MM3 (ref 0–0.2)
BASOPHILS NFR BLD AUTO: 0 % (ref 0–1.5)
BUN SERPL-MCNC: 27 MG/DL (ref 8–23)
BUN/CREAT SERPL: 19.3 (ref 7–25)
CALCIUM SPEC-SCNC: 9.3 MG/DL (ref 8.6–10.5)
CHLORIDE SERPL-SCNC: 105 MMOL/L (ref 98–107)
CO2 SERPL-SCNC: 24.1 MMOL/L (ref 22–29)
CREAT SERPL-MCNC: 1.4 MG/DL (ref 0.57–1)
DEPRECATED RDW RBC AUTO: 58.5 FL (ref 37–54)
EGFRCR SERPLBLD CKD-EPI 2021: 38.8 ML/MIN/1.73
EOSINOPHIL # BLD AUTO: 0.02 10*3/MM3 (ref 0–0.4)
EOSINOPHIL NFR BLD AUTO: 0.9 % (ref 0.3–6.2)
ERYTHROCYTE [DISTWIDTH] IN BLOOD BY AUTOMATED COUNT: 17.5 % (ref 12.3–15.4)
GLUCOSE SERPL-MCNC: 90 MG/DL (ref 65–99)
HCT VFR BLD AUTO: 23.7 % (ref 34–46.6)
HGB BLD-MCNC: 8.1 G/DL (ref 12–15.9)
IMM GRANULOCYTES # BLD AUTO: 0.01 10*3/MM3 (ref 0–0.05)
IMM GRANULOCYTES NFR BLD AUTO: 0.5 % (ref 0–0.5)
LYMPHOCYTES # BLD AUTO: 0.92 10*3/MM3 (ref 0.7–3.1)
LYMPHOCYTES NFR BLD AUTO: 41.4 % (ref 19.6–45.3)
MCH RBC QN AUTO: 33.1 PG (ref 26.6–33)
MCHC RBC AUTO-ENTMCNC: 34.2 G/DL (ref 31.5–35.7)
MCV RBC AUTO: 96.7 FL (ref 79–97)
MONOCYTES # BLD AUTO: 0.36 10*3/MM3 (ref 0.1–0.9)
MONOCYTES NFR BLD AUTO: 16.2 % (ref 5–12)
NEUTROPHILS NFR BLD AUTO: 0.91 10*3/MM3 (ref 1.7–7)
NEUTROPHILS NFR BLD AUTO: 41 % (ref 42.7–76)
PLATELET # BLD AUTO: 36 10*3/MM3 (ref 140–450)
PMV BLD AUTO: 10.3 FL (ref 6–12)
POTASSIUM SERPL-SCNC: 4.1 MMOL/L (ref 3.5–5.2)
RBC # BLD AUTO: 2.45 10*6/MM3 (ref 3.77–5.28)
SODIUM SERPL-SCNC: 141 MMOL/L (ref 136–145)
WBC NRBC COR # BLD AUTO: 2.22 10*3/MM3 (ref 3.4–10.8)

## 2025-05-30 PROCEDURE — 36415 COLL VENOUS BLD VENIPUNCTURE: CPT

## 2025-05-30 PROCEDURE — 85025 COMPLETE CBC W/AUTO DIFF WBC: CPT

## 2025-05-30 PROCEDURE — 80048 BASIC METABOLIC PNL TOTAL CA: CPT

## 2025-05-30 NOTE — DISCHARGE INSTRUCTIONS
Take the following medications the morning of surgery:      If you are on an Aspirin or a Blood Thinner please clarify with the surgeon and prescribing physician if and when you are to hold the medication or if you are to continue the medication.  If you are on prescription narcotic pain medication to control your pain you may also take that medication the morning of surgery.      General Instructions:     Do not eat solid food after midnight the night before surgery.  Clear liquids day of surgery are allowed but must be stopped at least two hours before your hospital arrival time.       Allowed clear liquids      Water, sodas, and tea or coffee with no cream or milk added.       12 to 20 ounces of a clear liquid that contains carbohydrates is recommended.  If non-diabetic, have Gatorade or Powerade.  If diabetic, have G2 or Powerade Zero.     Do not have liquids red in color.  Do not consume chicken, beef, pork or vegetable broth or bouillon cubes of any variety as they are not considered clear liquids and are not allowed.      Infants may have breast milk up to four hours before surgery.  Infants drinking formula may drink formula up to six hours before surgery.   Patients who avoid smoking, chewing tobacco and alcohol for 4 weeks prior to surgery have a reduced risk of post-operative complications.  Quit smoking as many days before surgery as you can.  Do not smoke, use chewing tobacco or drink alcohol the day of surgery.   If applicable bring your C-PAP/ BI-PAP machine in with you to preop day of surgery.  Bring any papers given to you in the doctor’s office.  Wear clean comfortable clothes.  Do not wear contact lenses, false eyelashes or make-up.  Bring a case for your glasses.   Bring crutches or walker if applicable.  Remove all piercings.  Leave jewelry and any other valuables at home.  Hair extensions with metal clips must be removed prior to surgery.  The Pre-Admission Testing nurse will instruct you to  bring medications if unable to obtain an accurate list in Pre-Admission Testing.    Day of surgery you will need to let the preoperative nurse know the last time you took each of your medications.  To ensure a safe environment for patients and staff, we kindly ask that children under the age of 16 not accompany patients.  If you must bring a dependent child or dependent adult please ensure a responsible adult, other than yourself, is present to supervise them.      If you were given a blood bank ID arm band remember to bring it with you the day of surgery.    Preventing a Surgical Site Infection:  For 2 to 3 days before surgery, avoid shaving with a razor because the razor can irritate skin and make it easier to develop an infection.    Any areas of open skin can increase the risk of a post-operative wound infection by allowing bacteria to enter and travel throughout the body.  Notify your surgeon if you have any skin wounds / rashes even if it is not near the expected surgical site.  The area will need assessed to determine if surgery should be delayed until it is healed.  The night prior to surgery shower using a fresh bar of anti-bacterial soap (such as Dial) and clean washcloth.  Sleep in a clean bed with clean clothing.  Do not allow pets to sleep with you.  Shower on the morning of surgery using a fresh bar of anti-bacterial soap (such as Dial) and clean washcloth.  Dry with a clean towel and dress in clean clothing.  Ask your surgeon if you will be receiving antibiotics prior to surgery.  Make sure you, your family, and all healthcare providers clean their hands with soap and water or an alcohol based hand  before caring for you or your wound.    Day of surgery:  Your arrival time is approximately two hours before your scheduled surgery time.  Please note if you have an early arrival time the surgery doors do not open before 5:00 AM.  Upon arrival, a Pre-op nurse and Anesthesiologist will review your  health history, obtain vital signs, and answer questions you may have.  The only belongings needed at this time will be a list of your home medications and if applicable your C-PAP/BI-PAP machine.  A Pre-op nurse will start an IV and you may receive medication in preparation for surgery, including something to help you relax.     Please be aware that surgery does come with discomfort.  We want to make every effort to control your discomfort so please discuss any uncontrolled symptoms with your nurse.   Your doctor will most likely have prescribed pain medications.      If you are going home after surgery you will receive individualized written care instructions before being discharged.  A responsible adult must drive you to and from the hospital on the day of your surgery and ideally stay with you through the night.   .  Discharge prescriptions can be filled by the hospital pharmacy during regular pharmacy hours.  If you are having surgery late in the day/evening your prescription may be e-prescribed to your pharmacy.  Please verify your pharmacy hours or chose a 24 hour pharmacy to avoid not having access to your prescription because your pharmacy has closed for the day.    If you are staying overnight following surgery, you will be transported to your hospital room following the recovery period.  [facility] has all private rooms.    If you have any questions please call Pre-Admission Testing at [phone].  Deductibles and co-payments are collected on the day of service. Please be prepared to pay the required co-pay, deductible or deposit on the day of service as defined by your plan.    Call your surgeon immediately if you experience any of the following symptoms:  Sore Throat  Shortness of Breath or difficulty breathing  Cough  Chills  Body soreness or muscle pain  Headache  Fever  New loss of taste or smell  Do not arrive for your surgery ill.  Your procedure will need to be rescheduled to  another time.  You will need to call your physician before the day of surgery to avoid any unnecessary exposure to hospital staff as well as other patients.      CHLORHEXIDINE CLOTH INSTRUCTIONS  The morning of surgery follow these instructions using the Chlorhexidine cloths you've been given.  These steps reduce bacteria on the body.  Do not use the cloths near your eyes, ears mouth, genitalia or on open wounds.  Throw the cloths away after use but do not try to flush them down a toilet.      Open and remove one cloth at a time from the package.    Leave the cloth unfolded and begin the bathing.  Massage the skin with the cloths using gentle pressure to remove bacteria.  Do not scrub harshly.   Follow the steps below with one 2% CHG cloth per area (6 total cloths).  One cloth for neck, shoulders and chest.  One cloth for both arms, hands, fingers and underarms (do underarms last).  One cloth for the abdomen followed by groin.  One cloth for right leg and foot including between the toes.  One cloth for left leg and foot including between the toes.  The last cloth is to be used for the back of the neck, back and buttocks.    Allow the CHG to air dry 3 minutes on the skin which will give it time to work and decrease the chance of irritation.  The skin may feel sticky until it is dry.  Do not rinse with water or any other liquid or you will lose the beneficial effects of the CHG.  If mild skin irritation occurs, do rinse the skin to remove the CHG.  Report this to the nurse at time of admission.  Do not apply lotions, creams, ointments, deodorants or perfumes after using the clothes. Dress in clean clothes before coming to the hospital.

## 2025-06-03 ENCOUNTER — OFFICE VISIT (OUTPATIENT)
Dept: ONCOLOGY | Facility: HOSPITAL | Age: 80
End: 2025-06-03
Payer: MEDICARE

## 2025-06-03 ENCOUNTER — OFFICE VISIT (OUTPATIENT)
Dept: OTHER | Facility: HOSPITAL | Age: 80
End: 2025-06-03
Payer: MEDICARE

## 2025-06-03 ENCOUNTER — HOSPITAL ENCOUNTER (OUTPATIENT)
Dept: ONCOLOGY | Facility: HOSPITAL | Age: 80
Discharge: HOME | End: 2025-06-03
Payer: MEDICARE

## 2025-06-03 VITALS
SYSTOLIC BLOOD PRESSURE: 157 MMHG | HEART RATE: 84 BPM | HEIGHT: 66 IN | WEIGHT: 194.45 LBS | BODY MASS INDEX: 31.25 KG/M2 | DIASTOLIC BLOOD PRESSURE: 62 MMHG | OXYGEN SATURATION: 100 % | RESPIRATION RATE: 18 BRPM | TEMPERATURE: 97.5 F

## 2025-06-03 VITALS
DIASTOLIC BLOOD PRESSURE: 62 MMHG | HEART RATE: 84 BPM | RESPIRATION RATE: 18 BRPM | SYSTOLIC BLOOD PRESSURE: 157 MMHG | WEIGHT: 194.4 LBS | OXYGEN SATURATION: 100 % | BODY MASS INDEX: 31.38 KG/M2

## 2025-06-03 DIAGNOSIS — C34.32 MALIGNANT NEOPLASM OF LOWER LOBE OF LEFT LUNG: ICD-10-CM

## 2025-06-03 DIAGNOSIS — D69.59 CHEMOTHERAPY-INDUCED THROMBOCYTOPENIA: ICD-10-CM

## 2025-06-03 DIAGNOSIS — R53.83 CHEMOTHERAPY-INDUCED FATIGUE: ICD-10-CM

## 2025-06-03 DIAGNOSIS — C34.30 NSCLC OF LOWER LOBE: Primary | ICD-10-CM

## 2025-06-03 DIAGNOSIS — K59.00 CONSTIPATION, UNSPECIFIED CONSTIPATION TYPE: ICD-10-CM

## 2025-06-03 DIAGNOSIS — C34.32 MALIGNANT NEOPLASM OF LOWER LOBE OF LEFT LUNG: Primary | ICD-10-CM

## 2025-06-03 DIAGNOSIS — J15.1 PNEUMONIA DUE TO PSEUDOMONAS SPECIES, UNSPECIFIED LATERALITY, UNSPECIFIED PART OF LUNG: ICD-10-CM

## 2025-06-03 DIAGNOSIS — T45.1X5A CHEMOTHERAPY-INDUCED FATIGUE: ICD-10-CM

## 2025-06-03 DIAGNOSIS — T45.1X5A CHEMOTHERAPY-INDUCED THROMBOCYTOPENIA: ICD-10-CM

## 2025-06-03 DIAGNOSIS — Z45.2 ENCOUNTER FOR ADJUSTMENT OR MANAGEMENT OF VASCULAR ACCESS DEVICE: Primary | ICD-10-CM

## 2025-06-03 LAB
BASOPHILS # BLD AUTO: 0.01 10*3/MM3 (ref 0–0.2)
BASOPHILS NFR BLD AUTO: 0.3 % (ref 0–1.5)
DEPRECATED RDW RBC AUTO: 69.1 FL (ref 37–54)
EOSINOPHIL # BLD AUTO: 0.08 10*3/MM3 (ref 0–0.4)
EOSINOPHIL NFR BLD AUTO: 2.4 % (ref 0.3–6.2)
ERYTHROCYTE [DISTWIDTH] IN BLOOD BY AUTOMATED COUNT: 20.3 % (ref 12.3–15.4)
HCT VFR BLD AUTO: 24.8 % (ref 34–46.6)
HGB BLD-MCNC: 8.3 G/DL (ref 12–15.9)
IMM GRANULOCYTES # BLD AUTO: 0.02 10*3/MM3 (ref 0–0.05)
IMM GRANULOCYTES NFR BLD AUTO: 0.6 % (ref 0–0.5)
LYMPHOCYTES # BLD AUTO: 1.18 10*3/MM3 (ref 0.7–3.1)
LYMPHOCYTES NFR BLD AUTO: 36.1 % (ref 19.6–45.3)
MCH RBC QN AUTO: 33.7 PG (ref 26.6–33)
MCHC RBC AUTO-ENTMCNC: 33.5 G/DL (ref 31.5–35.7)
MCV RBC AUTO: 100.8 FL (ref 79–97)
MONOCYTES # BLD AUTO: 0.52 10*3/MM3 (ref 0.1–0.9)
MONOCYTES NFR BLD AUTO: 15.9 % (ref 5–12)
NEUTROPHILS NFR BLD AUTO: 1.46 10*3/MM3 (ref 1.7–7)
NEUTROPHILS NFR BLD AUTO: 44.7 % (ref 42.7–76)
NRBC BLD AUTO-RTO: 0 /100 WBC (ref 0–0.2)
PLATELET # BLD AUTO: 113 10*3/MM3 (ref 140–450)
PMV BLD AUTO: 9.4 FL (ref 6–12)
RBC # BLD AUTO: 2.46 10*6/MM3 (ref 3.77–5.28)
WBC NRBC COR # BLD AUTO: 3.27 10*3/MM3 (ref 3.4–10.8)

## 2025-06-03 PROCEDURE — 1159F MED LIST DOCD IN RCRD: CPT | Performed by: THORACIC SURGERY (CARDIOTHORACIC VASCULAR SURGERY)

## 2025-06-03 PROCEDURE — 25010000002 HEPARIN LOCK FLUSH PER 10 UNITS: Performed by: INTERNAL MEDICINE

## 2025-06-03 PROCEDURE — 36591 DRAW BLOOD OFF VENOUS DEVICE: CPT

## 2025-06-03 PROCEDURE — 3077F SYST BP >= 140 MM HG: CPT | Performed by: THORACIC SURGERY (CARDIOTHORACIC VASCULAR SURGERY)

## 2025-06-03 PROCEDURE — 3078F DIAST BP <80 MM HG: CPT | Performed by: THORACIC SURGERY (CARDIOTHORACIC VASCULAR SURGERY)

## 2025-06-03 PROCEDURE — 85025 COMPLETE CBC W/AUTO DIFF WBC: CPT | Performed by: INTERNAL MEDICINE

## 2025-06-03 PROCEDURE — 1160F RVW MEDS BY RX/DR IN RCRD: CPT | Performed by: THORACIC SURGERY (CARDIOTHORACIC VASCULAR SURGERY)

## 2025-06-03 RX ORDER — HEPARIN SODIUM (PORCINE) LOCK FLUSH IV SOLN 100 UNIT/ML 100 UNIT/ML
500 SOLUTION INTRAVENOUS AS NEEDED
Status: DISCONTINUED | OUTPATIENT
Start: 2025-06-03 | End: 2025-06-04 | Stop reason: HOSPADM

## 2025-06-03 RX ORDER — SODIUM CHLORIDE 0.9 % (FLUSH) 0.9 %
20 SYRINGE (ML) INJECTION AS NEEDED
Status: DISCONTINUED | OUTPATIENT
Start: 2025-06-03 | End: 2025-06-04 | Stop reason: HOSPADM

## 2025-06-03 RX ORDER — SODIUM CHLORIDE 0.9 % (FLUSH) 0.9 %
20 SYRINGE (ML) INJECTION AS NEEDED
OUTPATIENT
Start: 2025-06-03

## 2025-06-03 RX ORDER — HEPARIN SODIUM (PORCINE) LOCK FLUSH IV SOLN 100 UNIT/ML 100 UNIT/ML
500 SOLUTION INTRAVENOUS AS NEEDED
OUTPATIENT
Start: 2025-06-03

## 2025-06-03 RX ADMIN — HEPARIN 500 UNITS: 100 SYRINGE at 12:01

## 2025-06-03 RX ADMIN — Medication 20 ML: at 12:01

## 2025-06-03 NOTE — LETTER
June 6, 2025     Mary Cruz, APRN  [address] [place] [address]    Patient: Lolis Griffith   YOB: 1945   Date of Visit: 6/3/2025     Dear Mary Cruz, APRN:       Thank you for referring Lolis Griffith to me for evaluation. Below are the relevant portions of my assessment and plan of care.    If you have questions, please do not hesitate to call me. I look forward to following Lolis along with you.         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 She has received neoadjuvant treatment and is currently scheduled for resection on 06/06/2025, approximately 3 weeks after completion of therapy. She presents to discuss her PET CT results.    She expresses eagerness to proceed with the scheduled surgery next Friday. She reports experiencing pain during her treatment but acknowledges that she has managed to endure it without any significant complications. She has reviewed her scan results and perceives an increase in activity, which she finds concerning. She is curious about the duration required for pathology results post-surgery. She also inquires about the possibility of her  staying overnight at the hospital with her. She has a preoperative appointment scheduled at [facility] on 06/23/2025.    SOCIAL HISTORY  Education Level: The patient is the first person in her family to go to college.  Occupations: The patient worked in IT.    History of Present Illness    Objective  Vital Signs:  /62 Comment: 175/73 1ST READ  Pulse 84   Resp 18   Wt 88.2 kg (194 lb 6.4 oz)   SpO2 100%   BMI 31.38 kg/m²   Estimated body mass index is 31.38 kg/m² as calculated from the following:     "Height as of 5/30/25: 167.6 cm (66\").    Weight as of this encounter: 88.2 kg (194 lb 6.4 oz).          Physical Exam     Physical Exam  General: Pleasant 79-year-old female, no acute distress.    Result Review:           Results  The following results are independently reviewed and interpreted by myself.    Imaging   - PET CT scan: 05/28/2025, Hypermetabolic left lower lobe lung nodule that is unchanged, a left lower thoracic lymph node associated with the esophagus is hypermetabolic, stable, hypermetabolism, left hilar lymph node as well as the subcarinal lymph node. No evidence of distant metastatic disease.           Assessment and Plan   Diagnoses and all orders for this visit:    1. NSCLC of lower lobe (Primary)        Assessment & Plan  1. Adenocarcinoma of the left lower lobe.  The PET CT scan performed on 05/28/2025 shows a hypermetabolic left lower lobe lung nodule that is unchanged. The paraesophageal lymph nodes are more hypermetabolic but unchanged in size. The subcarinal and hilar lymph nodes are stable. There is no evidence of distant metastatic disease. The patient has tolerated neoadjuvant treatment well with minimal complications. The plan is to proceed with major surgical resection scheduled for 06/06/2025. The procedure will involve five small incisions on the side of the chest to remove the lower lobe, involved lymph nodes, and any other abnormal tissue. The surgery is expected to take about 4 hours, and she will be hospitalized for 3 to 5 days. Postoperative care will include walking three laps around the nurses' station three times a day to prevent pneumonia. Potential risks, including pneumonia, atrial fibrillation, and the need for a drainage tube, were discussed. If atrial fibrillation occurs, it is usually self-limiting but may require medication adjustments. If a drainage tube is needed post-surgery, it will be removed in the office or at another location if necessary. Pathology results " from the resected tissue and lymph nodes will guide further treatment, which may include immunotherapy or chemoradiation depending on the findings.    I discussed her case with Dr. Banks who agrees proceeding with resection at this time as the best course forward.  She will likely have positive lymph nodes which will necessitate further treatment in the future with chemotherapy/immunotherapy.         Follow Up   No follow-ups on file.  Patient was given instructions and counseling regarding her condition or for health maintenance advice. Please see specific information pulled into the AVS if appropriate.     Patient or patient representative verbalized consent for the use of Ambient Listening during the visit with  Teodora Dozier MD for chart documentation. 6/6/2025  10:35 EDT

## 2025-06-03 NOTE — PROGRESS NOTES
Chief Complaint   NSCLC of lower lobe    Provider, No Known  Mary Cruz, ROBE LAMBERT Nacho presents to [facility] for lung adenocarcinoma    Patient is a very pleasant 79-year-old female with past medical history of hypertension, dyslipidemia, CKD who presents for lung adenocarcinoma.  Patient has CT chest done for lung nodule on 1/30/2025.  Shows stable irregular 1.6 cm left lower lobe nodule.  This showed stable mild mediastinal adenopathy which could be reactive or metastatic.  Ion bronchoscopy done same day showed left lower lobe biopsy positive for adenocarcinoma lipidic pattern.  EGFR, ROS, ALK all negative. PDL1 positive at 60%.  BAL culture positive for Pseudomonas.  She was started on IV cefepime for 10 days via PICC.  She has completed this.  MRI brain on 2/6/25 was negative for intracranial disease.  PET scan from 2/12/2025 showed a 1.6 cm left lower lobe nodule that was FDG avid.  FDG avid mediastinal and left hilar lymphadenopathy also shown.  Index subcarinal lymph nodes measure 2.5 cm with SUV max 6.6.  Also had a left infrahilar lymph node positive.  She has been started on chemotherapy with carboplatin pemetrexed and pembrolizumab as of 3/13/25. She has completed all 4 planned cycles as of 5/15/25.    Interval History  Patient presents for follow-up today.  She has been started on chemotherapy with carboplatin pemetrexed and pembrolizumab as of 3/13/25. She has completed all 4 planned cycles as of 5/15/25.    Repeat PET on 5/20/2025 showed stable size and hypermetabolic activity 1.7 cm left lower lobe nodule.  Increased hypermetabolic activity of the paraesophageal node.  Another node appears stable.  Stable hypermetabolic left hilar node.  Hypermetabolic subcarinal lymph node is unchanged.  No metastatic disease seen.  Surgery planned for 6/6/2025 per Dr. Dozier with CT surgery.  Will check platelets today to make sure  platelets are gone after surgery.  Patient reports she continues to have fatigue but this is slowly recovering.  Constipation has improved.  She is off Metamucil.  She is feeling well.  No fevers or chills.  No nausea or vomiting.        Oncology/Hematology History Overview Note   1/30/25: CT Chest done for lung nodule: Stable irregular 1.6 cm left lower lobe nodule, potentially neoplastic. Per Fleischner guidelines this could be assessed by short-term follow-up 3-month chest CT, PET/CT and/or biopsy. Recommend pulmonary consultation for management. Infectious/inflammatory lower lobe predominant tree-in-bud nodularity slightly waxing and waning since previous comparison. Findings could reflect bronchiolitis, microaspiration or atypical infection. Recommend attention on follow-up imaging.   Stable mild mediastinal adenopathy which could be reactive or metastatic pending etiology of the left lower lobe nodule. Exophytic indeterminate right upper pole renal lesion measuring 4.1 cm. At this size nonemergent renal ultrasound is recommended to assess if lesion is solid or cystic. Aortic and severe coronary atherosclerotic disease.    1/30/25: ION bronchoscopy with biopsy: BAL culture positive for >100K CFU/mL of Pseudomonas. Left lower lobe biopsy positive for adenocarcinoma, lepidic pattern, lung primary. EGFR, ROS, ALK negative. PDL1 positive at 60%.     Started on IV antibiotic (Cefepime) x 10 days for lung pseudomonas per pulmonary team.     2/6/25: MRI brain negative for intracranial disease    2/12/25: NM PET: A 1.6 cm left lower lobe nodule is FDG avid with SUV max 7.4. There is mild bibasilar atelectasis. There is FDG-avid mediastinal and left hilar lymphadenopathy. An index subcarinal lymph node on image 85 measures 2.5 x 1.3 cm with SUV max 6.6. An index left infrahilar lymph node on image 94 has SUV max 9.4, with note that size measurement is difficult on this unenhanced exam. No metastatic disease outside of  chest seen.     3/13/25: C1D1 neoadjuvant Carboplatin/Pemetrexed/Pembrolizumab  4/03/25: C2D1  5/13/25: C4D1 Carbo/Pemetrexed/Pembrolizumab      5/20/25: Repeat PET on 5/20/2025 showed stable size and hypermetabolic activity of 1.7 cm left lower lobe nodule.  Increased hypermetabolic activity of the paraesophageal node.  Another node appears stable.  Stable hypermetabolic left hilar node.  Hypermetabolic subcarinal lymph node is unchanged.  No metastatic disease seen.      6/6/2025: Surgical resection planned per Dr. Dozier with CT surgery.      Malignant neoplasm of lower lobe of left lung   2/24/2025 Initial Diagnosis    Malignant neoplasm of lower lobe of left lung     2/24/2025 Cancer Staged    Staging form: Lung, AJCC V9  - Clinical: Stage IIB (cT1b, cN2a, cM0) - Signed by Reinaldo Banks MD on 2/24/2025     3/13/2025 -  Chemotherapy    OP LUNG Pembrolizumab 200 mg / Pemetrexed / Carboplatin AUC=5           Review of Systems   Constitutional:  Positive for fatigue. Negative for appetite change, diaphoresis, fever, unexpected weight gain and unexpected weight loss.   HENT:  Negative for hearing loss, mouth sores, sore throat, swollen glands, trouble swallowing and voice change.    Eyes:  Negative for blurred vision.   Respiratory:  Negative for cough, shortness of breath and wheezing.    Cardiovascular:  Negative for chest pain and palpitations.   Gastrointestinal:  Negative for abdominal pain, blood in stool, constipation, diarrhea, nausea and vomiting.   Endocrine: Negative for cold intolerance and heat intolerance.   Genitourinary:  Negative for difficulty urinating, dysuria, frequency, hematuria and urinary incontinence.   Musculoskeletal:  Negative for arthralgias, back pain and myalgias.        Swollen ankles    Skin:  Positive for dry skin and rash. Negative for skin lesions and wound.   Neurological:  Negative for dizziness, seizures, weakness, numbness and headache.   Hematological:  Does not  bruise/bleed easily.   Psychiatric/Behavioral:  Negative for depressed mood. The patient is not nervous/anxious.    All other systems reviewed and are negative.    Current Outpatient Medications on File Prior to Visit   Medication Sig Dispense Refill    cetirizine (zyrTEC) 10 MG tablet Take 1 tablet by mouth Every Night.      Cholecalciferol 125 MCG (5000 UT) tablet Take 1 tablet by mouth Daily.      Docusate Sodium 100 MG capsule Take 1 tablet by mouth Every Night.      estradiol (ESTRACE) 0.1 MG/GM vaginal cream 1 g 3 (Three) Times a Week.      ezetimibe (ZETIA) 10 MG tablet Take 1 tablet by mouth Every Night.      fenofibrate (TRICOR) 145 MG tablet Take 1 tablet by mouth Daily.      ferrous sulfate 324 (65 Fe) MG tablet delayed-release EC tablet Take 1 tablet by mouth Every Night.      folic acid (FOLVITE) 1 MG tablet Take 1 tablet by mouth Daily. Start at least 7 days prior to chemotherapy until at least 3 weeks after all chemotherapy. 30 tablet 4    lisinopril (PRINIVIL,ZESTRIL) 5 MG tablet Take 1 tablet by mouth Daily. 30 tablet 3    loratadine (CLARITIN) 10 MG tablet Take 1 tablet by mouth Daily.      magnesium gluconate (MAGONATE) 500 MG tablet Take 0.5 tablets by mouth Every Night.      montelukast (SINGULAIR) 10 MG tablet Take 1 tablet by mouth Every Night.      omeprazole (priLOSEC) 40 MG capsule Take 1 capsule by mouth Daily As Needed.      ondansetron (ZOFRAN) 8 MG tablet Take 1 tablet by mouth 3 (Three) Times a Day As Needed for Nausea or Vomiting. 30 tablet 3    simvastatin (ZOCOR) 20 MG tablet Take 1 tablet by mouth Daily.      Unable to find Take 1 each by mouth Daily. PROBIOTIC PREBIOTICS AND CRANBERRY       No current facility-administered medications on file prior to visit.       No Known Allergies  Past Medical History:   Diagnosis Date    CKD (chronic kidney disease), stage III     COPD (chronic obstructive pulmonary disease)     PT DENIES THIS    GERD (gastroesophageal reflux disease)      "History of chemotherapy     LAST TREATMENT 5-15-25    Hypertension     Lung nodules     Malignant neoplasm of lower lobe of left lung     Other hyperlipidemia     Pancreas disorder     states scanned yearly ot monitor \"fatty pancreas\"    Pneumonia     Port-A-Cath in place     RIGHT CHEST    Seasonal allergies      Past Surgical History:   Procedure Laterality Date    BLADDER REPAIR      BRONCHOSCOPY WITH ION ROBOTIC ASSIST Left 01/30/2025    Procedure: BRONCHOSCOPY WITH ION ROBOT: BAL, REBUS, NEEDLE ASPIRATE, BIOPSIES insertion of lighted robot navigated instrument to view inside the lung;  Surgeon: Stevie Carson DO;  Location: [facility] MAIN OR;  Service: Robotics - Pulmonary;  Laterality: Left;    EYE SURGERY Bilateral     CATARACTS    LAPAROSCOPIC CHOLECYSTECTOMY      PARATHYROIDECTOMY      RECTAL PROLAPSE REPAIR  2021    TOTAL ABDOMINAL HYSTERECTOMY WITH SALPINGO OOPHORECTOMY      TOTAL HIP ARTHROPLASTY Left     VENOUS ACCESS DEVICE (PORT) INSERTION N/A 03/04/2025    Procedure: INSERTION VENOUS ACCESS DEVICE: Placement of a port in the tissue under the skin at your upper chest with a tube attached to it that goes into your vein;  Surgeon: Abhinav Martin MD;  Location: [facility] OR [facility];  Service: General;  Laterality: N/A;     Social History     Socioeconomic History    Marital status:    Tobacco Use    Smoking status: Never     Passive exposure: Never    Smokeless tobacco: Never   Vaping Use    Vaping status: Never Used   Substance and Sexual Activity    Alcohol use: Not Currently    Drug use: Never    Sexual activity: Defer     Family History   Problem Relation Age of Onset    Cancer Brother     Malig Hyperthermia Neg Hx        Objective   Physical Exam  Well appearing patient in no acute distress on RA  Anicteric sclerae, no rash on exposed skin  Respirations non-labored  Awake, alert, and oriented x 4. Speech intact. No gross neurologic deficit  Appropriate mood and affect    Vitals:    " "06/03/25 1101   BP: 157/62   Pulse: 84   Resp: 18   Temp: 97.5 °F (36.4 °C)   TempSrc: Temporal   SpO2: 100%   Weight: 88.2 kg (194 lb 7.1 oz)   Height: 167.6 cm (65.98\")   PainSc: 0-No pain                     PHQ-9 Total Score:           Result Review :   The following data was reviewed by: Reinaldo Banks MD on 06/03/25:  Lab Results   Component Value Date    HGB 8.1 (L) 05/30/2025    HCT 23.7 (L) 05/30/2025    MCV 96.7 05/30/2025    PLT 36 (C) 05/30/2025    WBC 2.22 (L) 05/30/2025    NEUTROABS 0.91 (L) 05/30/2025    LYMPHSABS 0.92 05/30/2025    MONOSABS 0.36 05/30/2025    EOSABS 0.02 05/30/2025    BASOSABS 0.00 05/30/2025     Lab Results   Component Value Date    GLUCOSE 90 05/30/2025    BUN 27.0 (H) 05/30/2025    CREATININE 1.40 (H) 05/30/2025     05/30/2025    K 4.1 05/30/2025     05/30/2025    CO2 24.1 05/30/2025    CALCIUM 9.3 05/30/2025    PROTEINTOT 6.4 05/13/2025    ALBUMIN 4.1 05/13/2025    BILITOT 0.4 05/13/2025    ALKPHOS 41 05/13/2025    AST 26 05/13/2025    ALT 15 05/13/2025     Lab Results   Component Value Date    PHOS 3.1 08/06/2024    FREET4 1.12 04/22/2025    TSH 1.680 04/22/2025     Labs personally reviewed. CBC with low platelets, improved from prior. Creatinine normal. Na low. Cl low.     PET personally reviewed and per my independent read with stable findings to prior.       NM PET/CT Skull Base to Mid Thigh  Result Date: 5/30/2025  1.1.7 cm hypermetabolic left lower lobe lung nodule is stable in size and hypermetabolic activity. 2.A lymph node to the left of the lower thoracic esophagus is stable in size but has increased in hypermetabolic activity, worrisome for slight rob disease progression. Another lymph node with low-level FDG uptake to the right of the thoracic esophagus  at the same level appears stable. 3.Stable hypermetabolic left hilar node, which remain suspicious for metastatic disease. 4.Hypermetabolic subcarinal lymph node, suspicious for metastatic disease, " is unchanged. 5.No evidence of metastatic disease within the neck, abdomen, pelvis, or skeleton Electronically Signed: Medina Wood MD  5/30/2025 11:29 AM EDT  Workstation ID: [id number]          Assessment and Plan    Diagnoses and all orders for this visit:    1. Malignant neoplasm of lower lobe of left lung (Primary)  -     CBC & Differential; Future    2. Chemotherapy-induced fatigue    3. Constipation, unspecified constipation type    4. Chemotherapy-induced thrombocytopenia            Left lung adenocarcinoma  Clinical T1cN2a based off PET.  Lymph node biopsies have not been performed. Received 4 cycles of neoadjuvant carboplatin, pemetrexed combined with pembrolizumab. C1D1 Carbo/Pemetrexed/Pembrolizumab neoadjuvant 3/13/25. C4D1 Carbo/Pemetrexed/Pembrolizumab 5/13/25.     Repeat PET on 5/20/2025 showed stable size and hypermetabolic activity of 1.7 cm left lower lobe nodule.  Increased hypermetabolic activity of the paraesophageal node.  Another node appears stable.  Stable hypermetabolic left hilar node.  Hypermetabolic subcarinal lymph node is unchanged.  No metastatic disease seen.  Surgery planned for 6/6/2025 per Dr. Dozier with CT surgery.     RTC 1 month post surgery.    Will see after to resume pembrolizumab after surgery vs consideration for chemoradiation.    Thrombocytopenia  2/2 chemo. Plts improved to 113K today so appropriate for surgery.    Constipation  Managed with prune juice and metamucil. Lactulose not started. Miralax has not been helpful. Improved    LE edema  Rec leg elevation    Chemo fatigue  Encouraged activity. Improving.     Hyponatremia  Increase hydration.          This is an acute or chronic illness that poses a threat to life or bodily function. The above treatment plan involves a high risk of complications and/or mortality of patient management.        I spent 30 minutes caring for Lolis on this date of service. This time includes time spent by me in the following  activities:preparing for the visit, reviewing tests, obtaining and/or reviewing a separately obtained history, performing a medically appropriate examination and/or evaluation , counseling and educating the patient/family/caregiver, ordering medications, tests, or procedures, referring and communicating with other health care professionals , documenting information in the medical record, independently interpreting results and communicating that information with the patient/family/caregiver, and care coordination    Patient Follow Up: after surgery  Patient was given instructions and counseling regarding her condition or for health maintenance advice. Please see specific information pulled into the AVS if appropriate.

## 2025-06-06 ENCOUNTER — APPOINTMENT (OUTPATIENT)
Dept: GENERAL RADIOLOGY | Facility: HOSPITAL | Age: 80
DRG: 164 | End: 2025-06-06
Payer: MEDICARE

## 2025-06-06 ENCOUNTER — ANESTHESIA EVENT (OUTPATIENT)
Dept: PERIOP | Facility: HOSPITAL | Age: 80
End: 2025-06-06
Payer: MEDICARE

## 2025-06-06 ENCOUNTER — ANESTHESIA (OUTPATIENT)
Dept: PERIOP | Facility: HOSPITAL | Age: 80
End: 2025-06-06
Payer: MEDICARE

## 2025-06-06 ENCOUNTER — HOSPITAL ENCOUNTER (INPATIENT)
Facility: HOSPITAL | Age: 80
LOS: 4 days | Discharge: HOME | DRG: 164 | End: 2025-06-10
Attending: THORACIC SURGERY (CARDIOTHORACIC VASCULAR SURGERY) | Admitting: THORACIC SURGERY (CARDIOTHORACIC VASCULAR SURGERY)
Payer: MEDICARE

## 2025-06-06 DIAGNOSIS — C34.12 MALIGNANT NEOPLASM OF UPPER LOBE, LEFT BRONCHUS OR LUNG: ICD-10-CM

## 2025-06-06 DIAGNOSIS — I48.0 PAROXYSMAL ATRIAL FIBRILLATION: ICD-10-CM

## 2025-06-06 DIAGNOSIS — C34.30 NSCLC OF LOWER LOBE: ICD-10-CM

## 2025-06-06 DIAGNOSIS — C34.92 ADENOCARCINOMA, LUNG, LEFT: ICD-10-CM

## 2025-06-06 LAB
ABO GROUP BLD: NORMAL
BASOPHILS # BLD AUTO: 0.01 10*3/MM3 (ref 0–0.2)
BASOPHILS NFR BLD AUTO: 0.3 % (ref 0–1.5)
BLD GP AB SCN SERPL QL: NEGATIVE
DEPRECATED RDW RBC AUTO: 65.7 FL (ref 37–54)
EOSINOPHIL # BLD AUTO: 0.09 10*3/MM3 (ref 0–0.4)
EOSINOPHIL NFR BLD AUTO: 3 % (ref 0.3–6.2)
ERYTHROCYTE [DISTWIDTH] IN BLOOD BY AUTOMATED COUNT: 19 % (ref 12.3–15.4)
HCT VFR BLD AUTO: 26.5 % (ref 34–46.6)
HGB BLD-MCNC: 8.9 G/DL (ref 12–15.9)
IMM GRANULOCYTES # BLD AUTO: 0.02 10*3/MM3 (ref 0–0.05)
IMM GRANULOCYTES NFR BLD AUTO: 0.7 % (ref 0–0.5)
LYMPHOCYTES # BLD AUTO: 1.03 10*3/MM3 (ref 0.7–3.1)
LYMPHOCYTES NFR BLD AUTO: 34 % (ref 19.6–45.3)
MCH RBC QN AUTO: 33.3 PG (ref 26.6–33)
MCHC RBC AUTO-ENTMCNC: 33.6 G/DL (ref 31.5–35.7)
MCV RBC AUTO: 99.3 FL (ref 79–97)
MONOCYTES # BLD AUTO: 0.45 10*3/MM3 (ref 0.1–0.9)
MONOCYTES NFR BLD AUTO: 14.9 % (ref 5–12)
NEUTROPHILS NFR BLD AUTO: 1.43 10*3/MM3 (ref 1.7–7)
NEUTROPHILS NFR BLD AUTO: 47.1 % (ref 42.7–76)
NRBC BLD AUTO-RTO: 0 /100 WBC (ref 0–0.2)
PLATELET # BLD AUTO: 212 10*3/MM3 (ref 140–450)
PMV BLD AUTO: 8.8 FL (ref 6–12)
RBC # BLD AUTO: 2.67 10*6/MM3 (ref 3.77–5.28)
RH BLD: POSITIVE
T&S EXPIRATION DATE: NORMAL
WBC NRBC COR # BLD AUTO: 3.03 10*3/MM3 (ref 3.4–10.8)

## 2025-06-06 PROCEDURE — 88313 SPECIAL STAINS GROUP 2: CPT | Performed by: THORACIC SURGERY (CARDIOTHORACIC VASCULAR SURGERY)

## 2025-06-06 PROCEDURE — 86850 RBC ANTIBODY SCREEN: CPT | Performed by: ANESTHESIOLOGY

## 2025-06-06 PROCEDURE — 94640 AIRWAY INHALATION TREATMENT: CPT

## 2025-06-06 PROCEDURE — 25010000002 MAGNESIUM SULFATE PER 500 MG OF MAGNESIUM: Performed by: REGISTERED NURSE

## 2025-06-06 PROCEDURE — 32674 THORACOSCOPY LYMPH NODE EXC: CPT | Performed by: THORACIC SURGERY (CARDIOTHORACIC VASCULAR SURGERY)

## 2025-06-06 PROCEDURE — 25810000003 LACTATED RINGERS PER 1000 ML: Performed by: REGISTERED NURSE

## 2025-06-06 PROCEDURE — 88305 TISSUE EXAM BY PATHOLOGIST: CPT | Performed by: THORACIC SURGERY (CARDIOTHORACIC VASCULAR SURGERY)

## 2025-06-06 PROCEDURE — 25010000002 ONDANSETRON PER 1 MG: Performed by: REGISTERED NURSE

## 2025-06-06 PROCEDURE — 71045 X-RAY EXAM CHEST 1 VIEW: CPT

## 2025-06-06 PROCEDURE — 0BTJ4ZZ RESECTION OF LEFT LOWER LUNG LOBE, PERCUTANEOUS ENDOSCOPIC APPROACH: ICD-10-PCS | Performed by: THORACIC SURGERY (CARDIOTHORACIC VASCULAR SURGERY)

## 2025-06-06 PROCEDURE — 32674 THORACOSCOPY LYMPH NODE EXC: CPT | Performed by: SPECIALIST/TECHNOLOGIST, OTHER

## 2025-06-06 PROCEDURE — 25010000002 BUPIVACAINE LIPOSOME 1.3 % SUSPENSION 10 ML VIAL: Performed by: THORACIC SURGERY (CARDIOTHORACIC VASCULAR SURGERY)

## 2025-06-06 PROCEDURE — 25010000002 BUPIVACAINE (PF) 0.25 % SOLUTION 10 ML VIAL: Performed by: THORACIC SURGERY (CARDIOTHORACIC VASCULAR SURGERY)

## 2025-06-06 PROCEDURE — 07B74ZX EXCISION OF THORAX LYMPHATIC, PERCUTANEOUS ENDOSCOPIC APPROACH, DIAGNOSTIC: ICD-10-PCS | Performed by: THORACIC SURGERY (CARDIOTHORACIC VASCULAR SURGERY)

## 2025-06-06 PROCEDURE — 86900 BLOOD TYPING SEROLOGIC ABO: CPT | Performed by: ANESTHESIOLOGY

## 2025-06-06 PROCEDURE — 32663 THORACOSCOPY W/LOBECTOMY: CPT | Performed by: SPECIALIST/TECHNOLOGIST, OTHER

## 2025-06-06 PROCEDURE — 25010000002 FENTANYL CITRATE (PF) 50 MCG/ML SOLUTION: Performed by: ANESTHESIOLOGY

## 2025-06-06 PROCEDURE — 25010000002 PROPOFOL 10 MG/ML EMULSION: Performed by: REGISTERED NURSE

## 2025-06-06 PROCEDURE — 25010000002 FENTANYL CITRATE (PF) 50 MCG/ML SOLUTION: Performed by: REGISTERED NURSE

## 2025-06-06 PROCEDURE — 25010000002 HEPARIN (PORCINE) PER 1000 UNITS: Performed by: THORACIC SURGERY (CARDIOTHORACIC VASCULAR SURGERY)

## 2025-06-06 PROCEDURE — 25010000002 FAMOTIDINE 10 MG/ML SOLUTION: Performed by: ANESTHESIOLOGY

## 2025-06-06 PROCEDURE — 3E0T3BZ INTRODUCTION OF ANESTHETIC AGENT INTO PERIPHERAL NERVES AND PLEXI, PERCUTANEOUS APPROACH: ICD-10-PCS | Performed by: THORACIC SURGERY (CARDIOTHORACIC VASCULAR SURGERY)

## 2025-06-06 PROCEDURE — 25010000002 DEXAMETHASONE SODIUM PHOSPHATE 20 MG/5ML SOLUTION: Performed by: REGISTERED NURSE

## 2025-06-06 PROCEDURE — 25010000002 SUGAMMADEX 200 MG/2ML SOLUTION: Performed by: REGISTERED NURSE

## 2025-06-06 PROCEDURE — 25010000002 HYDROMORPHONE PER 4 MG: Performed by: REGISTERED NURSE

## 2025-06-06 PROCEDURE — 25810000003 LACTATED RINGERS PER 1000 ML: Performed by: ANESTHESIOLOGY

## 2025-06-06 PROCEDURE — 86901 BLOOD TYPING SEROLOGIC RH(D): CPT | Performed by: ANESTHESIOLOGY

## 2025-06-06 PROCEDURE — 88309 TISSUE EXAM BY PATHOLOGIST: CPT | Performed by: THORACIC SURGERY (CARDIOTHORACIC VASCULAR SURGERY)

## 2025-06-06 PROCEDURE — 25810000003 SODIUM CHLORIDE PER 500 ML: Performed by: THORACIC SURGERY (CARDIOTHORACIC VASCULAR SURGERY)

## 2025-06-06 PROCEDURE — 32663 THORACOSCOPY W/LOBECTOMY: CPT | Performed by: THORACIC SURGERY (CARDIOTHORACIC VASCULAR SURGERY)

## 2025-06-06 PROCEDURE — 25810000003 SODIUM CHLORIDE 0.9 % SOLUTION: Performed by: THORACIC SURGERY (CARDIOTHORACIC VASCULAR SURGERY)

## 2025-06-06 PROCEDURE — 8E0W4CZ ROBOTIC ASSISTED PROCEDURE OF TRUNK REGION, PERCUTANEOUS ENDOSCOPIC APPROACH: ICD-10-PCS | Performed by: THORACIC SURGERY (CARDIOTHORACIC VASCULAR SURGERY)

## 2025-06-06 PROCEDURE — 25010000002 LIDOCAINE 2% SOLUTION: Performed by: REGISTERED NURSE

## 2025-06-06 PROCEDURE — 0BJ08ZZ INSPECTION OF TRACHEOBRONCHIAL TREE, VIA NATURAL OR ARTIFICIAL OPENING ENDOSCOPIC: ICD-10-PCS | Performed by: THORACIC SURGERY (CARDIOTHORACIC VASCULAR SURGERY)

## 2025-06-06 PROCEDURE — 94799 UNLISTED PULMONARY SVC/PX: CPT

## 2025-06-06 PROCEDURE — 25010000002 CEFAZOLIN PER 500 MG: Performed by: THORACIC SURGERY (CARDIOTHORACIC VASCULAR SURGERY)

## 2025-06-06 PROCEDURE — C1729 CATH, DRAINAGE: HCPCS | Performed by: THORACIC SURGERY (CARDIOTHORACIC VASCULAR SURGERY)

## 2025-06-06 PROCEDURE — 85025 COMPLETE CBC W/AUTO DIFF WBC: CPT | Performed by: ANESTHESIOLOGY

## 2025-06-06 PROCEDURE — 25010000002 ROPIVACAINE PER 1 MG: Performed by: ANESTHESIOLOGY

## 2025-06-06 PROCEDURE — 25010000002 MIDAZOLAM PER 1 MG: Performed by: ANESTHESIOLOGY

## 2025-06-06 DEVICE — SUREFORM 45 RELOAD GREEN
Type: IMPLANTABLE DEVICE | Site: CHEST | Status: FUNCTIONAL
Brand: SUREFORM

## 2025-06-06 DEVICE — 8MM STAPLER
Type: IMPLANTABLE DEVICE | Site: CHEST | Status: FUNCTIONAL
Brand: SUREFORM

## 2025-06-06 DEVICE — HORIZON TI SMALL RED 6 CLIPS/CART
Type: IMPLANTABLE DEVICE | Site: CHEST | Status: FUNCTIONAL
Brand: WECK

## 2025-06-06 RX ORDER — CETIRIZINE HYDROCHLORIDE 10 MG/1
10 TABLET ORAL DAILY
Status: DISCONTINUED | OUTPATIENT
Start: 2025-06-06 | End: 2025-06-10 | Stop reason: HOSPADM

## 2025-06-06 RX ORDER — NALOXONE HCL 0.4 MG/ML
0.4 VIAL (ML) INJECTION AS NEEDED
Status: DISCONTINUED | OUTPATIENT
Start: 2025-06-06 | End: 2025-06-06 | Stop reason: HOSPADM

## 2025-06-06 RX ORDER — ONDANSETRON 8 MG/1
8 TABLET, FILM COATED ORAL 3 TIMES DAILY PRN
Status: DISCONTINUED | OUTPATIENT
Start: 2025-06-06 | End: 2025-06-10 | Stop reason: HOSPADM

## 2025-06-06 RX ORDER — FENTANYL CITRATE 50 UG/ML
INJECTION, SOLUTION INTRAMUSCULAR; INTRAVENOUS
Status: COMPLETED | OUTPATIENT
Start: 2025-06-06 | End: 2025-06-06

## 2025-06-06 RX ORDER — SODIUM CHLORIDE 0.9 % (FLUSH) 0.9 %
3 SYRINGE (ML) INJECTION EVERY 12 HOURS SCHEDULED
Status: DISCONTINUED | OUTPATIENT
Start: 2025-06-06 | End: 2025-06-06 | Stop reason: HOSPADM

## 2025-06-06 RX ORDER — CELECOXIB 200 MG/1
200 CAPSULE ORAL ONCE
Status: COMPLETED | OUTPATIENT
Start: 2025-06-06 | End: 2025-06-06

## 2025-06-06 RX ORDER — SODIUM CHLORIDE 0.9 % (FLUSH) 0.9 %
3-10 SYRINGE (ML) INJECTION AS NEEDED
Status: DISCONTINUED | OUTPATIENT
Start: 2025-06-06 | End: 2025-06-06 | Stop reason: HOSPADM

## 2025-06-06 RX ORDER — MIDAZOLAM HYDROCHLORIDE 1 MG/ML
0.5 INJECTION, SOLUTION INTRAMUSCULAR; INTRAVENOUS
Status: DISCONTINUED | OUTPATIENT
Start: 2025-06-06 | End: 2025-06-06 | Stop reason: HOSPADM

## 2025-06-06 RX ORDER — ROPIVACAINE HYDROCHLORIDE 5 MG/ML
INJECTION, SOLUTION EPIDURAL; INFILTRATION; PERINEURAL
Status: COMPLETED | OUTPATIENT
Start: 2025-06-06 | End: 2025-06-06

## 2025-06-06 RX ORDER — DROPERIDOL 2.5 MG/ML
0.62 INJECTION, SOLUTION INTRAMUSCULAR; INTRAVENOUS
Status: DISCONTINUED | OUTPATIENT
Start: 2025-06-06 | End: 2025-06-06 | Stop reason: HOSPADM

## 2025-06-06 RX ORDER — HEPARIN SODIUM 5000 [USP'U]/ML
5000 INJECTION, SOLUTION INTRAVENOUS; SUBCUTANEOUS EVERY 8 HOURS SCHEDULED
Status: DISCONTINUED | OUTPATIENT
Start: 2025-06-07 | End: 2025-06-09

## 2025-06-06 RX ORDER — DOCUSATE SODIUM 100 MG/1
100 CAPSULE, LIQUID FILLED ORAL 2 TIMES DAILY
Status: DISCONTINUED | OUTPATIENT
Start: 2025-06-06 | End: 2025-06-10 | Stop reason: HOSPADM

## 2025-06-06 RX ORDER — SODIUM CHLORIDE, SODIUM LACTATE, POTASSIUM CHLORIDE, CALCIUM CHLORIDE 600; 310; 30; 20 MG/100ML; MG/100ML; MG/100ML; MG/100ML
INJECTION, SOLUTION INTRAVENOUS CONTINUOUS PRN
Status: DISCONTINUED | OUTPATIENT
Start: 2025-06-06 | End: 2025-06-06 | Stop reason: SURG

## 2025-06-06 RX ORDER — NITROGLYCERIN 0.4 MG/1
0.4 TABLET SUBLINGUAL
Status: DISCONTINUED | OUTPATIENT
Start: 2025-06-06 | End: 2025-06-10 | Stop reason: HOSPADM

## 2025-06-06 RX ORDER — LIDOCAINE HYDROCHLORIDE 20 MG/ML
INJECTION, SOLUTION INFILTRATION; PERINEURAL AS NEEDED
Status: DISCONTINUED | OUTPATIENT
Start: 2025-06-06 | End: 2025-06-06 | Stop reason: SURG

## 2025-06-06 RX ORDER — IPRATROPIUM BROMIDE AND ALBUTEROL SULFATE 2.5; .5 MG/3ML; MG/3ML
3 SOLUTION RESPIRATORY (INHALATION) ONCE AS NEEDED
Status: DISCONTINUED | OUTPATIENT
Start: 2025-06-06 | End: 2025-06-06 | Stop reason: HOSPADM

## 2025-06-06 RX ORDER — HYDRALAZINE HYDROCHLORIDE 20 MG/ML
5 INJECTION INTRAMUSCULAR; INTRAVENOUS
Status: DISCONTINUED | OUTPATIENT
Start: 2025-06-06 | End: 2025-06-06 | Stop reason: HOSPADM

## 2025-06-06 RX ORDER — DIPHENHYDRAMINE HCL 25 MG
25 CAPSULE ORAL EVERY 4 HOURS PRN
Status: DISCONTINUED | OUTPATIENT
Start: 2025-06-06 | End: 2025-06-06 | Stop reason: HOSPADM

## 2025-06-06 RX ORDER — CELECOXIB 100 MG/1
100 CAPSULE ORAL EVERY 12 HOURS SCHEDULED
Status: DISCONTINUED | OUTPATIENT
Start: 2025-06-06 | End: 2025-06-07

## 2025-06-06 RX ORDER — ACETAMINOPHEN 500 MG
1000 TABLET ORAL 3 TIMES DAILY
Status: DISCONTINUED | OUTPATIENT
Start: 2025-06-06 | End: 2025-06-10 | Stop reason: HOSPADM

## 2025-06-06 RX ORDER — ATORVASTATIN CALCIUM 20 MG/1
10 TABLET, FILM COATED ORAL DAILY
Status: DISCONTINUED | OUTPATIENT
Start: 2025-06-06 | End: 2025-06-10 | Stop reason: HOSPADM

## 2025-06-06 RX ORDER — MAGNESIUM SULFATE HEPTAHYDRATE 500 MG/ML
INJECTION, SOLUTION INTRAMUSCULAR; INTRAVENOUS AS NEEDED
Status: DISCONTINUED | OUTPATIENT
Start: 2025-06-06 | End: 2025-06-06 | Stop reason: SURG

## 2025-06-06 RX ORDER — MONTELUKAST SODIUM 10 MG/1
10 TABLET ORAL NIGHTLY
Status: DISCONTINUED | OUTPATIENT
Start: 2025-06-06 | End: 2025-06-10 | Stop reason: HOSPADM

## 2025-06-06 RX ORDER — ROCURONIUM BROMIDE 10 MG/ML
INJECTION, SOLUTION INTRAVENOUS AS NEEDED
Status: DISCONTINUED | OUTPATIENT
Start: 2025-06-06 | End: 2025-06-06 | Stop reason: SURG

## 2025-06-06 RX ORDER — METOCLOPRAMIDE HYDROCHLORIDE 5 MG/ML
10 INJECTION INTRAMUSCULAR; INTRAVENOUS ONCE AS NEEDED
Status: DISCONTINUED | OUTPATIENT
Start: 2025-06-06 | End: 2025-06-06 | Stop reason: HOSPADM

## 2025-06-06 RX ORDER — OXYCODONE HYDROCHLORIDE 5 MG/1
5 TABLET ORAL EVERY 4 HOURS PRN
Status: DISCONTINUED | OUTPATIENT
Start: 2025-06-06 | End: 2025-06-10 | Stop reason: HOSPADM

## 2025-06-06 RX ORDER — ONDANSETRON 2 MG/ML
4 INJECTION INTRAMUSCULAR; INTRAVENOUS ONCE AS NEEDED
Status: DISCONTINUED | OUTPATIENT
Start: 2025-06-06 | End: 2025-06-06 | Stop reason: HOSPADM

## 2025-06-06 RX ORDER — PANTOPRAZOLE SODIUM 40 MG/1
40 TABLET, DELAYED RELEASE ORAL
Status: DISCONTINUED | OUTPATIENT
Start: 2025-06-07 | End: 2025-06-10 | Stop reason: HOSPADM

## 2025-06-06 RX ORDER — DIPHENHYDRAMINE HYDROCHLORIDE 50 MG/ML
25 INJECTION, SOLUTION INTRAMUSCULAR; INTRAVENOUS EVERY 4 HOURS PRN
Status: DISCONTINUED | OUTPATIENT
Start: 2025-06-06 | End: 2025-06-06 | Stop reason: HOSPADM

## 2025-06-06 RX ORDER — FENOFIBRATE 145 MG/1
145 TABLET, FILM COATED ORAL DAILY
Status: DISCONTINUED | OUTPATIENT
Start: 2025-06-06 | End: 2025-06-10 | Stop reason: HOSPADM

## 2025-06-06 RX ORDER — HYDROMORPHONE HYDROCHLORIDE 1 MG/ML
0.5 INJECTION, SOLUTION INTRAMUSCULAR; INTRAVENOUS; SUBCUTANEOUS
Status: DISCONTINUED | OUTPATIENT
Start: 2025-06-06 | End: 2025-06-10 | Stop reason: HOSPADM

## 2025-06-06 RX ORDER — FENTANYL CITRATE 50 UG/ML
50 INJECTION, SOLUTION INTRAMUSCULAR; INTRAVENOUS ONCE AS NEEDED
Status: DISCONTINUED | OUTPATIENT
Start: 2025-06-06 | End: 2025-06-06 | Stop reason: HOSPADM

## 2025-06-06 RX ORDER — SODIUM CHLORIDE 9 MG/ML
INJECTION, SOLUTION INTRAVENOUS AS NEEDED
Status: DISCONTINUED | OUTPATIENT
Start: 2025-06-06 | End: 2025-06-06 | Stop reason: HOSPADM

## 2025-06-06 RX ORDER — ONDANSETRON 2 MG/ML
INJECTION INTRAMUSCULAR; INTRAVENOUS AS NEEDED
Status: DISCONTINUED | OUTPATIENT
Start: 2025-06-06 | End: 2025-06-06 | Stop reason: SURG

## 2025-06-06 RX ORDER — KETAMINE HCL IN NACL, ISO-OSM 100MG/10ML
10 SYRINGE (ML) INJECTION
Status: DISCONTINUED | OUTPATIENT
Start: 2025-06-06 | End: 2025-06-06 | Stop reason: HOSPADM

## 2025-06-06 RX ORDER — METOPROLOL TARTRATE 25 MG/1
25 TABLET, FILM COATED ORAL EVERY 12 HOURS SCHEDULED
Status: DISCONTINUED | OUTPATIENT
Start: 2025-06-06 | End: 2025-06-09

## 2025-06-06 RX ORDER — ACETAMINOPHEN 500 MG
1000 TABLET ORAL ONCE
Status: COMPLETED | OUTPATIENT
Start: 2025-06-06 | End: 2025-06-06

## 2025-06-06 RX ORDER — DEXAMETHASONE SODIUM PHOSPHATE 4 MG/ML
INJECTION, SOLUTION INTRA-ARTICULAR; INTRALESIONAL; INTRAMUSCULAR; INTRAVENOUS; SOFT TISSUE AS NEEDED
Status: DISCONTINUED | OUTPATIENT
Start: 2025-06-06 | End: 2025-06-06 | Stop reason: SURG

## 2025-06-06 RX ORDER — SODIUM CHLORIDE 9 MG/ML
75 INJECTION, SOLUTION INTRAVENOUS CONTINUOUS
Status: ACTIVE | OUTPATIENT
Start: 2025-06-06 | End: 2025-06-07

## 2025-06-06 RX ORDER — NALOXONE HCL 0.4 MG/ML
0.4 VIAL (ML) INJECTION
Status: DISCONTINUED | OUTPATIENT
Start: 2025-06-06 | End: 2025-06-06 | Stop reason: HOSPADM

## 2025-06-06 RX ORDER — FAMOTIDINE 10 MG/ML
20 INJECTION, SOLUTION INTRAVENOUS ONCE
Status: COMPLETED | OUTPATIENT
Start: 2025-06-06 | End: 2025-06-06

## 2025-06-06 RX ORDER — BISACODYL 10 MG
10 SUPPOSITORY, RECTAL RECTAL DAILY PRN
Status: DISCONTINUED | OUTPATIENT
Start: 2025-06-06 | End: 2025-06-10 | Stop reason: HOSPADM

## 2025-06-06 RX ORDER — SODIUM CHLORIDE 9 MG/ML
40 INJECTION, SOLUTION INTRAVENOUS AS NEEDED
Status: DISCONTINUED | OUTPATIENT
Start: 2025-06-06 | End: 2025-06-06 | Stop reason: HOSPADM

## 2025-06-06 RX ORDER — GABAPENTIN 300 MG/1
300 CAPSULE ORAL EVERY 8 HOURS SCHEDULED
Status: DISCONTINUED | OUTPATIENT
Start: 2025-06-06 | End: 2025-06-07

## 2025-06-06 RX ORDER — HYDROMORPHONE HYDROCHLORIDE 1 MG/ML
0.25 INJECTION, SOLUTION INTRAMUSCULAR; INTRAVENOUS; SUBCUTANEOUS
Status: DISCONTINUED | OUTPATIENT
Start: 2025-06-06 | End: 2025-06-06 | Stop reason: HOSPADM

## 2025-06-06 RX ORDER — CETIRIZINE HYDROCHLORIDE 10 MG/1
10 TABLET ORAL NIGHTLY
Status: DISCONTINUED | OUTPATIENT
Start: 2025-06-06 | End: 2025-06-06 | Stop reason: SDUPTHER

## 2025-06-06 RX ORDER — AMOXICILLIN 250 MG
2 CAPSULE ORAL NIGHTLY
Status: DISCONTINUED | OUTPATIENT
Start: 2025-06-06 | End: 2025-06-10 | Stop reason: HOSPADM

## 2025-06-06 RX ORDER — GABAPENTIN 300 MG/1
300 CAPSULE ORAL ONCE
Status: COMPLETED | OUTPATIENT
Start: 2025-06-06 | End: 2025-06-06

## 2025-06-06 RX ORDER — HEPARIN SODIUM 5000 [USP'U]/ML
5000 INJECTION, SOLUTION INTRAVENOUS; SUBCUTANEOUS ONCE
Status: COMPLETED | OUTPATIENT
Start: 2025-06-06 | End: 2025-06-06

## 2025-06-06 RX ORDER — PROPOFOL 10 MG/ML
VIAL (ML) INTRAVENOUS AS NEEDED
Status: DISCONTINUED | OUTPATIENT
Start: 2025-06-06 | End: 2025-06-06 | Stop reason: SURG

## 2025-06-06 RX ORDER — SODIUM CHLORIDE, SODIUM LACTATE, POTASSIUM CHLORIDE, CALCIUM CHLORIDE 600; 310; 30; 20 MG/100ML; MG/100ML; MG/100ML; MG/100ML
9 INJECTION, SOLUTION INTRAVENOUS CONTINUOUS
Status: ACTIVE | OUTPATIENT
Start: 2025-06-06 | End: 2025-06-07

## 2025-06-06 RX ORDER — LIDOCAINE HYDROCHLORIDE 10 MG/ML
0.5 INJECTION, SOLUTION INFILTRATION; PERINEURAL ONCE AS NEEDED
Status: DISCONTINUED | OUTPATIENT
Start: 2025-06-06 | End: 2025-06-06 | Stop reason: HOSPADM

## 2025-06-06 RX ORDER — IPRATROPIUM BROMIDE AND ALBUTEROL SULFATE 2.5; .5 MG/3ML; MG/3ML
3 SOLUTION RESPIRATORY (INHALATION)
Status: DISPENSED | OUTPATIENT
Start: 2025-06-06 | End: 2025-06-08

## 2025-06-06 RX ORDER — FLUMAZENIL 0.1 MG/ML
0.2 INJECTION INTRAVENOUS AS NEEDED
Status: DISCONTINUED | OUTPATIENT
Start: 2025-06-06 | End: 2025-06-06 | Stop reason: HOSPADM

## 2025-06-06 RX ORDER — KETAMINE HCL IN NACL, ISO-OSM 100MG/10ML
SYRINGE (ML) INJECTION AS NEEDED
Status: DISCONTINUED | OUTPATIENT
Start: 2025-06-06 | End: 2025-06-06 | Stop reason: SURG

## 2025-06-06 RX ORDER — FENTANYL CITRATE 0.05 MG/ML
INJECTION, SOLUTION INTRAMUSCULAR; INTRAVENOUS AS NEEDED
Status: DISCONTINUED | OUTPATIENT
Start: 2025-06-06 | End: 2025-06-06 | Stop reason: SURG

## 2025-06-06 RX ORDER — EPHEDRINE SULFATE 50 MG/ML
INJECTION INTRAVENOUS AS NEEDED
Status: DISCONTINUED | OUTPATIENT
Start: 2025-06-06 | End: 2025-06-06 | Stop reason: SURG

## 2025-06-06 RX ORDER — LABETALOL HYDROCHLORIDE 5 MG/ML
5 INJECTION, SOLUTION INTRAVENOUS
Status: DISCONTINUED | OUTPATIENT
Start: 2025-06-06 | End: 2025-06-06 | Stop reason: HOSPADM

## 2025-06-06 RX ORDER — OXYCODONE HYDROCHLORIDE 5 MG/1
5 TABLET ORAL ONCE AS NEEDED
Status: DISCONTINUED | OUTPATIENT
Start: 2025-06-06 | End: 2025-06-06 | Stop reason: HOSPADM

## 2025-06-06 RX ORDER — FOLIC ACID 1 MG/1
1 TABLET ORAL DAILY
Status: DISCONTINUED | OUTPATIENT
Start: 2025-06-06 | End: 2025-06-10 | Stop reason: HOSPADM

## 2025-06-06 RX ADMIN — Medication 30 MG: at 12:01

## 2025-06-06 RX ADMIN — ONDANSETRON 4 MG: 2 INJECTION, SOLUTION INTRAMUSCULAR; INTRAVENOUS at 12:01

## 2025-06-06 RX ADMIN — ROCURONIUM BROMIDE 10 MG: 10 INJECTION, SOLUTION INTRAVENOUS at 14:04

## 2025-06-06 RX ADMIN — EPHEDRINE SULFATE 10 MG: 50 INJECTION INTRAVENOUS at 12:23

## 2025-06-06 RX ADMIN — ROCURONIUM BROMIDE 50 MG: 10 INJECTION, SOLUTION INTRAVENOUS at 12:05

## 2025-06-06 RX ADMIN — IPRATROPIUM BROMIDE AND ALBUTEROL SULFATE 3 ML: .5; 3 SOLUTION RESPIRATORY (INHALATION) at 23:44

## 2025-06-06 RX ADMIN — SODIUM CHLORIDE, POTASSIUM CHLORIDE, SODIUM LACTATE AND CALCIUM CHLORIDE: 600; 310; 30; 20 INJECTION, SOLUTION INTRAVENOUS at 11:30

## 2025-06-06 RX ADMIN — SENNOSIDES AND DOCUSATE SODIUM 2 TABLET: 50; 8.6 TABLET ORAL at 21:37

## 2025-06-06 RX ADMIN — ROCURONIUM BROMIDE 10 MG: 10 INJECTION, SOLUTION INTRAVENOUS at 13:11

## 2025-06-06 RX ADMIN — Medication 10 MG: at 13:11

## 2025-06-06 RX ADMIN — ACETAMINOPHEN 1000 MG: 500 TABLET, FILM COATED ORAL at 21:37

## 2025-06-06 RX ADMIN — HEPARIN SODIUM 5000 UNITS: 5000 INJECTION INTRAVENOUS; SUBCUTANEOUS at 10:58

## 2025-06-06 RX ADMIN — CEFAZOLIN 2000 MG: 2 INJECTION, POWDER, FOR SOLUTION INTRAMUSCULAR; INTRAVENOUS at 11:37

## 2025-06-06 RX ADMIN — MAGNESIUM SULFATE HEPTAHYDRATE 2 G: 500 INJECTION, SOLUTION INTRAMUSCULAR; INTRAVENOUS at 12:22

## 2025-06-06 RX ADMIN — METOPROLOL TARTRATE 25 MG: 25 TABLET, FILM COATED ORAL at 21:38

## 2025-06-06 RX ADMIN — Medication 10 MG: at 14:04

## 2025-06-06 RX ADMIN — FENTANYL CITRATE 25 MCG: 50 INJECTION, SOLUTION INTRAMUSCULAR; INTRAVENOUS at 11:08

## 2025-06-06 RX ADMIN — ACETAMINOPHEN 1000 MG: 500 TABLET, FILM COATED ORAL at 10:24

## 2025-06-06 RX ADMIN — SODIUM CHLORIDE, POTASSIUM CHLORIDE, SODIUM LACTATE AND CALCIUM CHLORIDE 9 ML/HR: 600; 310; 30; 20 INJECTION, SOLUTION INTRAVENOUS at 10:43

## 2025-06-06 RX ADMIN — ROPIVACAINE HYDROCHLORIDE 20 ML: 5 INJECTION EPIDURAL; INFILTRATION; PERINEURAL at 11:10

## 2025-06-06 RX ADMIN — FENTANYL CITRATE 50 MCG: 50 INJECTION INTRAMUSCULAR; INTRAVENOUS at 12:41

## 2025-06-06 RX ADMIN — MONTELUKAST 10 MG: 10 TABLET, FILM COATED ORAL at 21:38

## 2025-06-06 RX ADMIN — GABAPENTIN 300 MG: 300 CAPSULE ORAL at 10:24

## 2025-06-06 RX ADMIN — OXYCODONE HYDROCHLORIDE 5 MG: 5 TABLET ORAL at 22:30

## 2025-06-06 RX ADMIN — EPHEDRINE SULFATE 10 MG: 50 INJECTION INTRAVENOUS at 14:38

## 2025-06-06 RX ADMIN — MIDAZOLAM 0.5 MG: 1 INJECTION INTRAMUSCULAR; INTRAVENOUS at 11:07

## 2025-06-06 RX ADMIN — DOCUSATE SODIUM 100 MG: 100 CAPSULE, LIQUID FILLED ORAL at 21:39

## 2025-06-06 RX ADMIN — EPHEDRINE SULFATE 10 MG: 50 INJECTION INTRAVENOUS at 12:25

## 2025-06-06 RX ADMIN — LIDOCAINE HYDROCHLORIDE 60 MG: 20 INJECTION, SOLUTION INFILTRATION; PERINEURAL at 12:02

## 2025-06-06 RX ADMIN — PROPOFOL 150 MG: 10 INJECTION, EMULSION INTRAVENOUS at 12:03

## 2025-06-06 RX ADMIN — CELECOXIB 100 MG: 100 CAPSULE ORAL at 21:37

## 2025-06-06 RX ADMIN — DEXAMETHASONE SODIUM PHOSPHATE 8 MG: 4 INJECTION, SOLUTION INTRAMUSCULAR; INTRAVENOUS at 12:06

## 2025-06-06 RX ADMIN — SODIUM CHLORIDE 75 ML/HR: 9 INJECTION, SOLUTION INTRAVENOUS at 17:28

## 2025-06-06 RX ADMIN — SUGAMMADEX 200 MG: 100 INJECTION, SOLUTION INTRAVENOUS at 14:33

## 2025-06-06 RX ADMIN — CELECOXIB 200 MG: 200 CAPSULE ORAL at 10:24

## 2025-06-06 RX ADMIN — GABAPENTIN 300 MG: 300 CAPSULE ORAL at 21:38

## 2025-06-06 RX ADMIN — FENTANYL CITRATE 50 MCG: 50 INJECTION INTRAMUSCULAR; INTRAVENOUS at 12:02

## 2025-06-06 RX ADMIN — FAMOTIDINE 20 MG: 10 INJECTION INTRAVENOUS at 10:44

## 2025-06-06 RX ADMIN — HYDROMORPHONE HYDROCHLORIDE 0.25 MG: 1 INJECTION, SOLUTION INTRAMUSCULAR; INTRAVENOUS; SUBCUTANEOUS at 16:21

## 2025-06-06 NOTE — PROGRESS NOTES
"Chief Complaint  NSCLC of lower lobe (FU PRIOR TO SURGERY AND REVIEW PET 5/28)    Subjective        History of Present Illness  The patient presents for follow-up of her left lower lobe adenocarcinoma. She has received neoadjuvant treatment and is currently scheduled for resection on 06/06/2025, approximately 3 weeks after completion of therapy. She presents to discuss her PET CT results.    She expresses eagerness to proceed with the scheduled surgery next Friday. She reports experiencing pain during her treatment but acknowledges that she has managed to endure it without any significant complications. She has reviewed her scan results and perceives an increase in activity, which she finds concerning. She is curious about the duration required for pathology results post-surgery. She also inquires about the possibility of her  staying overnight at the hospital with her. She has a preoperative appointment scheduled at [facility] on 06/23/2025.    SOCIAL HISTORY  Education Level: The patient is the first person in her family to go to college.  Occupations: The patient worked in IT.    History of Present Illness    Objective   Vital Signs:  /62 Comment: 175/73 1ST READ  Pulse 84   Resp 18   Wt 88.2 kg (194 lb 6.4 oz)   SpO2 100%   BMI 31.38 kg/m²   Estimated body mass index is 31.38 kg/m² as calculated from the following:    Height as of 5/30/25: 167.6 cm (66\").    Weight as of this encounter: 88.2 kg (194 lb 6.4 oz).          Physical Exam     Physical Exam  General: Pleasant 79-year-old female, no acute distress.    Result Review :           Results  The following results are independently reviewed and interpreted by myself.    Imaging   - PET CT scan: 05/28/2025, Hypermetabolic left lower lobe lung nodule that is unchanged, a left lower thoracic lymph node associated with the esophagus is hypermetabolic, stable, hypermetabolism, left hilar lymph node as well as the subcarinal lymph node. No " evidence of distant metastatic disease.           Assessment and Plan   Diagnoses and all orders for this visit:    1. NSCLC of lower lobe (Primary)        Assessment & Plan  1. Adenocarcinoma of the left lower lobe.  The PET CT scan performed on 05/28/2025 shows a hypermetabolic left lower lobe lung nodule that is unchanged. The paraesophageal lymph nodes are more hypermetabolic but unchanged in size. The subcarinal and hilar lymph nodes are stable. There is no evidence of distant metastatic disease. The patient has tolerated neoadjuvant treatment well with minimal complications. The plan is to proceed with major surgical resection scheduled for 06/06/2025. The procedure will involve five small incisions on the side of the chest to remove the lower lobe, involved lymph nodes, and any other abnormal tissue. The surgery is expected to take about 4 hours, and she will be hospitalized for 3 to 5 days. Postoperative care will include walking three laps around the nurses' station three times a day to prevent pneumonia. Potential risks, including pneumonia, atrial fibrillation, and the need for a drainage tube, were discussed. If atrial fibrillation occurs, it is usually self-limiting but may require medication adjustments. If a drainage tube is needed post-surgery, it will be removed in the office or at another location if necessary. Pathology results from the resected tissue and lymph nodes will guide further treatment, which may include immunotherapy or chemoradiation depending on the findings.    I discussed her case with Dr. Banks who agrees proceeding with resection at this time as the best course forward.  She will likely have positive lymph nodes which will necessitate further treatment in the future with chemotherapy/immunotherapy.         Follow Up   No follow-ups on file.  Patient was given instructions and counseling regarding her condition or for health maintenance advice. Please see specific information  pulled into the AVS if appropriate.     Patient or patient representative verbalized consent for the use of Ambient Listening during the visit with  Teodora Dozier MD for chart documentation. 6/6/2025  10:35 EDT

## 2025-06-06 NOTE — BRIEF OP NOTE
THORACOSCOPY WITH LOBECTOMY WITH DAVINCI ROBOT  Progress Note    Lolis LAMBERT Nacho  6/6/2025    Pre-op Diagnosis:   NSCLC of lower lobe [C34.30]  Malignant neoplasm of upper lobe, left bronchus or lung [C34.12]       Post-Op Diagnosis Codes:     * NSCLC of lower lobe [C34.30]     * Malignant neoplasm of upper lobe, left bronchus or lung [C34.12]    Procedure(s):      Procedure(s):  THORACOSCOPY WITH LOBECTOMY WITH DAVINCI ROBOT, mediastinal and pilar lymph node dissection, intercostal nerve blocks, bronchoscopy      Synoptic portion:    Intent:  curative    Surgical procedure performed:  Resection of at least one lobe or bilobectomy - Lobectomy WITH mediastinal lymph node dissection    Mediastinal lymph node stations resected:  5 Subaortic, 7 Subcarinal, 8 Paraesophageal (below paige), and 9 Pulmonary ligament    Hilar lymph node station(s) resected:  10 Hilar and 11 Interlobar          Surgeon(s):  Teodora Dozier MD    Anesthesia: General with Block    Staff:   Circulator: Concepcion Avalos RN; Ese Caraballo RN  Scrub Person: Ana Maria Gregory; Va Grant  Assistant: Freddy Brown CSA  Assistant: Freddy Brown CSA    Estimated Blood Loss: 100ml    Urine Voided: * No values recorded between 6/6/2025 11:47 AM and 6/6/2025  2:45 PM *    Specimens:                Specimens       ID Source Type Tests Collected By Collected At Frozen?    A Lung, L Tissue TISSUE PATHOLOGY EXAM   Teodora Dozier MD 6/6/25 1246 No    Description: Left level 8 lymph node-fresh for permanent    B Lung, L Tissue TISSUE PATHOLOGY EXAM   Teodora Dozier MD 6/6/25 1246 No    Description: Left level 9 lymph node-fresh for permanent    C Lung, L Tissue TISSUE PATHOLOGY EXAM   Teodora Dozier MD 6/6/25 1246 No    Description: level 7 lymph node-fresh for permanent    D Lung, L Tissue TISSUE PATHOLOGY EXAM   Teodora Dozier MD 6/6/25 1254 No    Description: level 7 number 2 lymph node-fresh for permanent    E Lung, L Tissue  TISSUE PATHOLOGY EXAM   Teodora Dozier MD 6/6/25 1257 No    Description: left level 5 lymph node-fresh for permanent    F Lung, L Tissue TISSUE PATHOLOGY EXAM   Teodora Dozier MD 6/6/25 1330 No    Description: left level 10 lymph node-fresh for permanent    G Lung, L Tissue TISSUE PATHOLOGY EXAM   Teodora Dozier MD 6/6/25 1427 No    Description: left level 11 lymph node-fresh for permanent    H Lung, L Tissue TISSUE PATHOLOGY EXAM   Teodora Dozier MD 6/6/25 1427 No    Description: left lower lung lobectomy-fresh for permanent              Drains:   Chest Tube Left Midaxillary (Active)       [REMOVED] Urethral Catheter Silicone 16 Fr. (Removed)       Findings: Enlarged mediastinal lymph nodes      Complications: None apparent    Assistant: Freddy Brown CSA  was responsible for performing the following activities: Retraction, Suction, Closing, Placing Dressing, and Held/Positioned Camera and their skilled assistance was necessary for the success of this case.    Teodora Dozier MD     Date: 6/6/2025  Time: 14:47 EDT

## 2025-06-06 NOTE — ANESTHESIA PREPROCEDURE EVALUATION
Anesthesia Evaluation     Patient summary reviewed and Nursing notes reviewed   no history of anesthetic complications:   NPO Solid Status: > 2 hours  NPO Liquid Status: > 8 hours           Airway   Mallampati: II  TM distance: >3 FB  Neck ROM: full  No difficulty expected  Dental      Comment: bridge    Pulmonary - normal exam   (+) pneumonia , lung cancer, COPD,  Cardiovascular     ECG reviewed  Rhythm: regular  Rate: normal    (+) hypertension, hyperlipidemia  (-) past MI, dysrhythmias    ROS comment: Stress test 3/2025    Interpretation Summary    Low risk, abnormal, regadenoson myocardial perfusion imaging study.  There is a fixed mid apical Perfusion defect noticed persisting on attenuation correction images suggestive of either artifact or scar.  There are small sized, mild intensity perfusion defect of distal anterior distal lateral and distal inferior wall segments which paradoxically correct on stress images suggestive of artifact.  Overall less than 5% of LV myocardium is involved.  Stress EF was estimated to be 54%.  Transient ischemic dilatation was noted, 1.31 however it significance is undetermined.  EKG changes are nondiagnostic.  Rare PACs were noted in recovery phase.  Otherwise no significant arrhythmias were noted      Neuro/Psych  GI/Hepatic/Renal/Endo    (+) obesity, GERD, renal disease- CRI    Musculoskeletal     Abdominal   (+) obese   Substance History      OB/GYN          Other      history of cancer active      Other Comment: Anemia  thrombocytopenia  ROS/Med Hx Other: Assessment and Plan   Diagnoses and all orders for this visit:     1. NSCLC of lower lobe (Primary)  -     NM PET/CT Skull Base to Mid Thigh; Future  -     Case request     2. Malignant neoplasm of upper lobe, left bronchus or lung  -     NM PET/CT Skull Base to Mid Thigh; Future  -     Case request        Ms. 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  She is scheduled for  her fourth and final round of chemotherapy on 5/15/2025.  Based on her pulmonary function studies, she would be a candidate for resection.  We will go ahead and schedule her for surgery in early June 2025.  She will see Dr. Dozier in the office prior to her surgery.  Preoperative orders have been placed and she will follow-up with Dr. Dozier on 6/3/2025.                      Anesthesia Plan    ASA 3     general with block and Niya     intravenous induction     Anesthetic plan, risks, benefits, and alternatives have been provided, discussed and informed consent has been obtained with: patient.  Pre-procedure education provided  Plan discussed with CRNA.        CODE STATUS:

## 2025-06-06 NOTE — PLAN OF CARE
Problem: Adult Inpatient Plan of Care  Goal: Plan of Care Review  Outcome: Progressing  Flowsheets (Taken 6/6/2025 1712)  Progress: no change  Outcome Evaluation: vitals stable, chesttube-20  cm without airleak, due to void by 2130  Goal: Patient-Specific Goal (Individualized)  Outcome: Progressing  Goal: Absence of Hospital-Acquired Illness or Injury  Outcome: Progressing  Intervention: Identify and Manage Fall Risk  Recent Flowsheet Documentation  Taken 6/6/2025 1706 by Khalida Taylor RN  Safety Promotion/Fall Prevention:   safety round/check completed   room organization consistent   nonskid shoes/slippers when out of bed   lighting adjusted   fall prevention program maintained   clutter free environment maintained   assistive device/personal items within reach  Intervention: Prevent and Manage VTE (Venous Thromboembolism) Risk  Recent Flowsheet Documentation  Taken 6/6/2025 1706 by Khalida Taylor RN  VTE Prevention/Management:   bilateral   SCDs (sequential compression devices) on  Goal: Optimal Comfort and Wellbeing  Outcome: Progressing  Intervention: Provide Person-Centered Care  Recent Flowsheet Documentation  Taken 6/6/2025 1706 by Khalida Taylor RN  Trust Relationship/Rapport:   care explained   questions answered   reassurance provided   thoughts/feelings acknowledged  Goal: Readiness for Transition of Care  Outcome: Progressing   Goal Outcome Evaluation:           Progress: no change  Outcome Evaluation: vitals stable, chesttube-20  cm without airleak, due to void by 2130

## 2025-06-06 NOTE — H&P (VIEW-ONLY)
"Chief Complaint  NSCLC of lower lobe (FU PRIOR TO SURGERY AND REVIEW PET 5/28)    Subjective        History of Present Illness  The patient presents for follow-up of her left lower lobe adenocarcinoma. She has received neoadjuvant treatment and is currently scheduled for resection on 06/06/2025, approximately 3 weeks after completion of therapy. She presents to discuss her PET CT results.    She expresses eagerness to proceed with the scheduled surgery next Friday. She reports experiencing pain during her treatment but acknowledges that she has managed to endure it without any significant complications. She has reviewed her scan results and perceives an increase in activity, which she finds concerning. She is curious about the duration required for pathology results post-surgery. She also inquires about the possibility of her  staying overnight at the hospital with her. She has a preoperative appointment scheduled at [facility] on 06/23/2025.    SOCIAL HISTORY  Education Level: The patient is the first person in her family to go to college.  Occupations: The patient worked in IT.    History of Present Illness    Objective   Vital Signs:  /62 Comment: 175/73 1ST READ  Pulse 84   Resp 18   Wt 88.2 kg (194 lb 6.4 oz)   SpO2 100%   BMI 31.38 kg/m²   Estimated body mass index is 31.38 kg/m² as calculated from the following:    Height as of 5/30/25: 167.6 cm (66\").    Weight as of this encounter: 88.2 kg (194 lb 6.4 oz).          Physical Exam     Physical Exam  General: Pleasant 79-year-old female, no acute distress.    Result Review :           Results  The following results are independently reviewed and interpreted by myself.    Imaging   - PET CT scan: 05/28/2025, Hypermetabolic left lower lobe lung nodule that is unchanged, a left lower thoracic lymph node associated with the esophagus is hypermetabolic, stable, hypermetabolism, left hilar lymph node as well as the subcarinal lymph node. No " evidence of distant metastatic disease.           Assessment and Plan   Diagnoses and all orders for this visit:    1. NSCLC of lower lobe (Primary)        Assessment & Plan  1. Adenocarcinoma of the left lower lobe.  The PET CT scan performed on 05/28/2025 shows a hypermetabolic left lower lobe lung nodule that is unchanged. The paraesophageal lymph nodes are more hypermetabolic but unchanged in size. The subcarinal and hilar lymph nodes are stable. There is no evidence of distant metastatic disease. The patient has tolerated neoadjuvant treatment well with minimal complications. The plan is to proceed with major surgical resection scheduled for 06/06/2025. The procedure will involve five small incisions on the side of the chest to remove the lower lobe, involved lymph nodes, and any other abnormal tissue. The surgery is expected to take about 4 hours, and she will be hospitalized for 3 to 5 days. Postoperative care will include walking three laps around the nurses' station three times a day to prevent pneumonia. Potential risks, including pneumonia, atrial fibrillation, and the need for a drainage tube, were discussed. If atrial fibrillation occurs, it is usually self-limiting but may require medication adjustments. If a drainage tube is needed post-surgery, it will be removed in the office or at another location if necessary. Pathology results from the resected tissue and lymph nodes will guide further treatment, which may include immunotherapy or chemoradiation depending on the findings.    I discussed her case with Dr. Banks who agrees proceeding with resection at this time as the best course forward.  She will likely have positive lymph nodes which will necessitate further treatment in the future with chemotherapy/immunotherapy.         Follow Up   No follow-ups on file.  Patient was given instructions and counseling regarding her condition or for health maintenance advice. Please see specific information  pulled into the AVS if appropriate.     Patient or patient representative verbalized consent for the use of Ambient Listening during the visit with  Teodora Dozier MD for chart documentation. 6/6/2025  10:35 EDT           General

## 2025-06-06 NOTE — ANESTHESIA POSTPROCEDURE EVALUATION
Patient: Lolis Griffith    Procedure Summary       Date: 06/06/25 Room / Location: [facility] OR [address] [place] [address] MAIN OR    Anesthesia Start: 1147 Anesthesia Stop: 1525    Procedure: THORACOSCOPY WITH LOBECTOMY WITH DAVINCI ROBOT, mediastinal and pilar lymph node dissection, intercostal nerve blocks, bronchoscopy (Left: Chest) Diagnosis:       NSCLC of lower lobe      Malignant neoplasm of upper lobe, left bronchus or lung      (NSCLC of lower lobe [C34.30])      (Malignant neoplasm of upper lobe, left bronchus or lung [C34.12])    Surgeons: Teodora Dozier MD Provider: Rossi Spears MD    Anesthesia Type: general with blockNiya ASA Status: 3            Anesthesia Type: general with blockNiya    Vitals  Vitals Value Taken Time   /57 06/06/25 16:45   Temp 36.5 °C (97.7 °F) 06/06/25 15:25   Pulse 76 06/06/25 16:45   Resp 14 06/06/25 16:45   SpO2 100 % 06/06/25 16:45           Post Anesthesia Care and Evaluation      Comments: Discharge criteria met per RN

## 2025-06-06 NOTE — DISCHARGE INSTRUCTIONS
Post-op VAT / Thoracotomy Discharge Instructions    1. Activity:  Climb stairs as tolerated  May drive car after 2 weeks or as directed by Physician  Walk at least 3-4 times a day  Limit lifting for first 6 weeks. No lifting, pushing, or pulling greater than 10 pounds till seen by MD.  Continue to use your incentive spirometer at least 4 times per day.    2. Nutrition:  Resume previous diet  Eat well balanced meals  Avoid constipation by eating fresh fruits, vegetables, whole grain foods and increasing fluid intake.    3. Incision (wound) Care:  Remove dressing after 48 hours  If continued drainage, change dressing every 24 hours and as needed.  May shower after discharge.  Wash around incision area with soap and water daily.  No lotions or creams on incision area.  Take temperature daily for the first week after discharge.     4. When to call for Medical Advise:  Fever greater than 101 degrees  Unusual or severe pain  Difficulty breathing  Incision changes (redness, swelling, or increased drainage)  Any questions or problems    5. Medication Instructions:  Take Pain medications as directed to stay comfortable  No driving or drinking alcohol when taking prescribed pain meds  Laxative of choice if needed for constipation.    6. Medication and dosages:  See discharge medication instruction form

## 2025-06-06 NOTE — ANESTHESIA PROCEDURE NOTES
Arterial Line      Patient reassessed immediately prior to procedure    Patient location during procedure: pre-op  Start time: 6/6/2025 11:15 AM  Stop Time:6/6/2025 11:20 AM       Line placed for hemodynamic monitoring, MD/Surgeon request and ABGs/Labs/ISTAT.  Performed By   Anesthesiologist: Rossi Spears MD   Preanesthetic Checklist  Completed: patient identified, surgical consent and timeout performed  Arterial Line Prep    Sterile Tech: sterile barriers and mask  Prep: ChloraPrep  Patient monitoring: continuous pulse oximetry and blood pressure monitoring  Arterial Line Procedure   Laterality:right  Location:  radial artery  Catheter size: 20 G   Guidance: ultrasound guided  PROCEDURE NOTE/ULTRASOUND INTERPRETATION.  Using ultrasound guidance the potential vascular sites for insertion of the catheter were visualized to determine the patency of the vessel to be used for vascular access.  After selecting the appropriate site for insertion, the needle was visualized under ultrasound being inserted into the radial artery, followed by ultrasound confirmation of wire and catheter placement. There were no abnormalities seen on ultrasound; an image was taken; and the patient tolerated the procedure with no complications.   Number of attempts: 1  Successful placement: yes Images: still images not obtained  Post Assessment   Dressing Type: occlusive dressing applied.   Complications no  Circ/Move/Sens Assessment: normal and unchanged.   Patient Tolerance: patient tolerated the procedure well with no apparent complications  Additional Notes  Using ultrasound guidance the potential vascular sites for insertion of the catheter were visualized to determine the patency of the vessel to be used for vascular access.  After selecting the appropriate site for insertion, the needle was visualized under ultrasound being inserted into the radial artery, followed by ultrasound confirmation of wire and catheter placement.  There  were no abnormalities seen on ultrasound; an image was taken/ and the patient tolerated the procedure with no complications.

## 2025-06-06 NOTE — ANESTHESIA PROCEDURE NOTES
Peripheral Block      Patient reassessed immediately prior to procedure    Patient location during procedure: pre-op  Start time: 6/6/2025 11:05 AM  Stop time: 6/6/2025 11:10 AM  Reason for block: at surgeon's request and post-op pain management  Performed by  Anesthesiologist: Rossi Spears MD  Preanesthetic Checklist  Completed: patient identified, risks and benefits discussed and timeout performed  Prep:  Pt Position: sitting  Sterile barriers:washed/disinfected hands and mask  Prep: ChloraPrep  Patient monitoring: blood pressure monitoring, continuous pulse oximetry and EKG  Procedure    Sedation: yes    Guidance:ultrasound guided    ULTRASOUND INTERPRETATION.  Using ultrasound guidance a 21 G gauge needle was placed in close proximity to the nerve, at which point, under ultrasound guidance anesthetic was injected in the area of the nerve and spread of the anesthesia was seen on ultrasound in close proximity thereto.  There were no abnormalities seen on ultrasound; a digital image was taken; and the patient tolerated the procedure with no complications. Images:still images obtained, printed/placed on chart    Laterality:left  Block Type:erector spinae block  Injection Technique:single-shot  Needle Type:echogenic  Resistance on Injection: none    Medications Used: ropivacaine (NAROPIN) 0.5 % injection - Injection   20 mL - 6/6/2025 11:10:00 AM      Post Assessment  Injection Assessment: negative aspiration for heme, no paresthesia on injection and incremental injection  Patient Tolerance:comfortable throughout block  Complications:no  Additional Notes  Ultrasound Interpretation:  Using ultrasound guidance the needle was placed in close proximity to the nerve and anesthetic was injected in the area of the nerve and spread of the anesthetic was seen on ultrasound in close proximity thereto.  There were no abnormalities seen on ultrasound; a digital image was taken; and the patient tolerated the procedure  with no complications.       Performed by: Rossi Spears MD

## 2025-06-07 ENCOUNTER — APPOINTMENT (OUTPATIENT)
Dept: GENERAL RADIOLOGY | Facility: HOSPITAL | Age: 80
DRG: 164 | End: 2025-06-07
Payer: MEDICARE

## 2025-06-07 LAB
ANION GAP SERPL CALCULATED.3IONS-SCNC: 8.8 MMOL/L (ref 5–15)
BASOPHILS # BLD AUTO: 0 10*3/MM3 (ref 0–0.2)
BASOPHILS NFR BLD AUTO: 0 % (ref 0–1.5)
BUN SERPL-MCNC: 21 MG/DL (ref 8–23)
BUN/CREAT SERPL: 19.6 (ref 7–25)
CALCIUM SPEC-SCNC: 8.2 MG/DL (ref 8.6–10.5)
CHLORIDE SERPL-SCNC: 108 MMOL/L (ref 98–107)
CO2 SERPL-SCNC: 24.2 MMOL/L (ref 22–29)
CREAT SERPL-MCNC: 1.07 MG/DL (ref 0.57–1)
DEPRECATED RDW RBC AUTO: 66.4 FL (ref 37–54)
EGFRCR SERPLBLD CKD-EPI 2021: 53.6 ML/MIN/1.73
EOSINOPHIL # BLD AUTO: 0.01 10*3/MM3 (ref 0–0.4)
EOSINOPHIL NFR BLD AUTO: 0.3 % (ref 0.3–6.2)
ERYTHROCYTE [DISTWIDTH] IN BLOOD BY AUTOMATED COUNT: 18.9 % (ref 12.3–15.4)
GLUCOSE SERPL-MCNC: 99 MG/DL (ref 65–99)
HCT VFR BLD AUTO: 22.8 % (ref 34–46.6)
HGB BLD-MCNC: 7.6 G/DL (ref 12–15.9)
IMM GRANULOCYTES # BLD AUTO: 0.01 10*3/MM3 (ref 0–0.05)
IMM GRANULOCYTES NFR BLD AUTO: 0.3 % (ref 0–0.5)
LYMPHOCYTES # BLD AUTO: 0.65 10*3/MM3 (ref 0.7–3.1)
LYMPHOCYTES NFR BLD AUTO: 16.4 % (ref 19.6–45.3)
MCH RBC QN AUTO: 33.2 PG (ref 26.6–33)
MCHC RBC AUTO-ENTMCNC: 33.3 G/DL (ref 31.5–35.7)
MCV RBC AUTO: 99.6 FL (ref 79–97)
MONOCYTES # BLD AUTO: 0.68 10*3/MM3 (ref 0.1–0.9)
MONOCYTES NFR BLD AUTO: 17.1 % (ref 5–12)
NEUTROPHILS NFR BLD AUTO: 2.62 10*3/MM3 (ref 1.7–7)
NEUTROPHILS NFR BLD AUTO: 65.9 % (ref 42.7–76)
NRBC BLD AUTO-RTO: 0 /100 WBC (ref 0–0.2)
PLATELET # BLD AUTO: 194 10*3/MM3 (ref 140–450)
PMV BLD AUTO: 8.9 FL (ref 6–12)
POTASSIUM SERPL-SCNC: 3.9 MMOL/L (ref 3.5–5.2)
RBC # BLD AUTO: 2.29 10*6/MM3 (ref 3.77–5.28)
SODIUM SERPL-SCNC: 141 MMOL/L (ref 136–145)
WBC NRBC COR # BLD AUTO: 3.97 10*3/MM3 (ref 3.4–10.8)

## 2025-06-07 PROCEDURE — 94664 DEMO&/EVAL PT USE INHALER: CPT

## 2025-06-07 PROCEDURE — 94799 UNLISTED PULMONARY SVC/PX: CPT

## 2025-06-07 PROCEDURE — 99024 POSTOP FOLLOW-UP VISIT: CPT | Performed by: NURSE PRACTITIONER

## 2025-06-07 PROCEDURE — 85025 COMPLETE CBC W/AUTO DIFF WBC: CPT | Performed by: THORACIC SURGERY (CARDIOTHORACIC VASCULAR SURGERY)

## 2025-06-07 PROCEDURE — 94760 N-INVAS EAR/PLS OXIMETRY 1: CPT

## 2025-06-07 PROCEDURE — 25010000002 HEPARIN (PORCINE) PER 1000 UNITS: Performed by: THORACIC SURGERY (CARDIOTHORACIC VASCULAR SURGERY)

## 2025-06-07 PROCEDURE — 94761 N-INVAS EAR/PLS OXIMETRY MLT: CPT

## 2025-06-07 PROCEDURE — 80048 BASIC METABOLIC PNL TOTAL CA: CPT | Performed by: THORACIC SURGERY (CARDIOTHORACIC VASCULAR SURGERY)

## 2025-06-07 PROCEDURE — 71045 X-RAY EXAM CHEST 1 VIEW: CPT

## 2025-06-07 RX ORDER — GABAPENTIN 100 MG/1
200 CAPSULE ORAL EVERY 12 HOURS SCHEDULED
Status: DISCONTINUED | OUTPATIENT
Start: 2025-06-07 | End: 2025-06-10 | Stop reason: HOSPADM

## 2025-06-07 RX ADMIN — CELECOXIB 100 MG: 100 CAPSULE ORAL at 09:23

## 2025-06-07 RX ADMIN — HEPARIN SODIUM 5000 UNITS: 5000 INJECTION, SOLUTION INTRAVENOUS; SUBCUTANEOUS at 14:41

## 2025-06-07 RX ADMIN — CETIRIZINE HYDROCHLORIDE 10 MG: 10 TABLET ORAL at 09:23

## 2025-06-07 RX ADMIN — OXYCODONE HYDROCHLORIDE 5 MG: 5 TABLET ORAL at 21:46

## 2025-06-07 RX ADMIN — FENOFIBRATE 145 MG: 145 TABLET ORAL at 09:23

## 2025-06-07 RX ADMIN — ACETAMINOPHEN 1000 MG: 500 TABLET, FILM COATED ORAL at 09:23

## 2025-06-07 RX ADMIN — METOPROLOL TARTRATE 25 MG: 25 TABLET, FILM COATED ORAL at 21:46

## 2025-06-07 RX ADMIN — OXYCODONE HYDROCHLORIDE 5 MG: 5 TABLET ORAL at 14:41

## 2025-06-07 RX ADMIN — HEPARIN SODIUM 5000 UNITS: 5000 INJECTION, SOLUTION INTRAVENOUS; SUBCUTANEOUS at 05:29

## 2025-06-07 RX ADMIN — GABAPENTIN 200 MG: 100 CAPSULE ORAL at 21:46

## 2025-06-07 RX ADMIN — FOLIC ACID 1 MG: 1 TABLET ORAL at 09:23

## 2025-06-07 RX ADMIN — IPRATROPIUM BROMIDE AND ALBUTEROL SULFATE 3 ML: .5; 3 SOLUTION RESPIRATORY (INHALATION) at 14:23

## 2025-06-07 RX ADMIN — PANTOPRAZOLE SODIUM 40 MG: 40 TABLET, DELAYED RELEASE ORAL at 05:29

## 2025-06-07 RX ADMIN — SENNOSIDES AND DOCUSATE SODIUM 2 TABLET: 50; 8.6 TABLET ORAL at 21:47

## 2025-06-07 RX ADMIN — METOPROLOL TARTRATE 25 MG: 25 TABLET, FILM COATED ORAL at 09:23

## 2025-06-07 RX ADMIN — OXYCODONE HYDROCHLORIDE 5 MG: 5 TABLET ORAL at 10:11

## 2025-06-07 RX ADMIN — ATORVASTATIN CALCIUM 10 MG: 20 TABLET, FILM COATED ORAL at 09:23

## 2025-06-07 RX ADMIN — MONTELUKAST 10 MG: 10 TABLET, FILM COATED ORAL at 21:47

## 2025-06-07 RX ADMIN — HEPARIN SODIUM 5000 UNITS: 5000 INJECTION, SOLUTION INTRAVENOUS; SUBCUTANEOUS at 21:47

## 2025-06-07 RX ADMIN — IPRATROPIUM BROMIDE AND ALBUTEROL SULFATE 3 ML: .5; 3 SOLUTION RESPIRATORY (INHALATION) at 06:25

## 2025-06-07 RX ADMIN — ACETAMINOPHEN 1000 MG: 500 TABLET, FILM COATED ORAL at 21:47

## 2025-06-07 RX ADMIN — IPRATROPIUM BROMIDE AND ALBUTEROL SULFATE 3 ML: .5; 3 SOLUTION RESPIRATORY (INHALATION) at 22:30

## 2025-06-07 RX ADMIN — DOCUSATE SODIUM 100 MG: 100 CAPSULE, LIQUID FILLED ORAL at 21:47

## 2025-06-07 RX ADMIN — GABAPENTIN 300 MG: 300 CAPSULE ORAL at 05:29

## 2025-06-07 RX ADMIN — ACETAMINOPHEN 1000 MG: 500 TABLET, FILM COATED ORAL at 14:41

## 2025-06-07 NOTE — PLAN OF CARE
Goal Outcome Evaluation:  Plan of Care Reviewed With: patient        Progress: improving  Outcome Evaluation: Monitor pain,labs,and vitals. VSS. CT to waterseal. Pain controlled with PRN and scheduled medications per orders. Up to chair. IS use encouraged.

## 2025-06-07 NOTE — PROGRESS NOTES
"  POST-OPERATIVE NOTE     Chief Complaint: LLL NSCLC  S/P: Robot assisted left lower lobectomy  POD # 1    Subjective  Up to chair. Feeling well. Family at bedside.      Objective:  General Appearance:  Comfortable and well-appearing.    Vital signs: (most recent): Blood pressure 137/58, pulse 56, temperature 97.1 °F (36.2 °C), temperature source Oral, resp. rate 16, height 167.6 cm (66\"), weight 86.6 kg (191 lb), SpO2 95%.    HEENT: Normal HEENT exam.    Lungs:  Normal effort.  She is not in respiratory distress.    Heart: Normal rate.    Chest: Chest wall tenderness present.    Abdomen: Abdomen is soft.  Bowel sounds are normal.     Extremities: Normal range of motion.    Pulses: Distal pulses are intact.    Skin:  Warm and dry.                Chest tube:   Site: Left, Clean, Dry, Intact, and Securement device intact  Suction: waterseal  Air Leak: negative  24 Hour Total: 130ml    Results Review:     I reviewed the patient's new clinical results.  I reviewed the patient's new imaging results and agree with the interpretation.  Discussed with patient, nursing, surgeon    Assessment & Plan     POD 1 s/p left lower lobectomy. Doing well overall. CXR as expected. No airleak. Will plan to leave chest tube to waterseal and re-check a CXR in the morning. Encourage mobilization and IS. Stop celebrex and decrease gabapentin dose due to elevated creatinine.    Naomi Whitlock DNP, APRN  [facility]  06/07/25  10:40 EDT    Patient was seen and assessed while wearing personal protective equipment including facemask, protective eyewear and gloves.  Hand hygiene performed prior to entering the room and upon exiting with doffing of gloves.       "

## 2025-06-08 ENCOUNTER — APPOINTMENT (OUTPATIENT)
Dept: GENERAL RADIOLOGY | Facility: HOSPITAL | Age: 80
DRG: 164 | End: 2025-06-08
Payer: MEDICARE

## 2025-06-08 PROCEDURE — 25010000002 HEPARIN (PORCINE) PER 1000 UNITS: Performed by: THORACIC SURGERY (CARDIOTHORACIC VASCULAR SURGERY)

## 2025-06-08 PROCEDURE — 71045 X-RAY EXAM CHEST 1 VIEW: CPT

## 2025-06-08 PROCEDURE — 99024 POSTOP FOLLOW-UP VISIT: CPT | Performed by: NURSE PRACTITIONER

## 2025-06-08 PROCEDURE — 94664 DEMO&/EVAL PT USE INHALER: CPT

## 2025-06-08 PROCEDURE — 94799 UNLISTED PULMONARY SVC/PX: CPT

## 2025-06-08 PROCEDURE — 94761 N-INVAS EAR/PLS OXIMETRY MLT: CPT

## 2025-06-08 RX ADMIN — SENNOSIDES AND DOCUSATE SODIUM 2 TABLET: 50; 8.6 TABLET ORAL at 21:21

## 2025-06-08 RX ADMIN — GABAPENTIN 200 MG: 100 CAPSULE ORAL at 21:21

## 2025-06-08 RX ADMIN — HEPARIN SODIUM 5000 UNITS: 5000 INJECTION, SOLUTION INTRAVENOUS; SUBCUTANEOUS at 06:35

## 2025-06-08 RX ADMIN — ACETAMINOPHEN 1000 MG: 500 TABLET, FILM COATED ORAL at 21:21

## 2025-06-08 RX ADMIN — METOPROLOL TARTRATE 25 MG: 25 TABLET, FILM COATED ORAL at 21:21

## 2025-06-08 RX ADMIN — HEPARIN SODIUM 5000 UNITS: 5000 INJECTION, SOLUTION INTRAVENOUS; SUBCUTANEOUS at 21:21

## 2025-06-08 RX ADMIN — FOLIC ACID 1 MG: 1 TABLET ORAL at 09:29

## 2025-06-08 RX ADMIN — CETIRIZINE HYDROCHLORIDE 10 MG: 10 TABLET ORAL at 09:29

## 2025-06-08 RX ADMIN — DOCUSATE SODIUM 100 MG: 100 CAPSULE, LIQUID FILLED ORAL at 21:21

## 2025-06-08 RX ADMIN — IPRATROPIUM BROMIDE AND ALBUTEROL SULFATE 3 ML: .5; 3 SOLUTION RESPIRATORY (INHALATION) at 07:03

## 2025-06-08 RX ADMIN — ACETAMINOPHEN 1000 MG: 500 TABLET, FILM COATED ORAL at 09:29

## 2025-06-08 RX ADMIN — OXYCODONE HYDROCHLORIDE 5 MG: 5 TABLET ORAL at 06:35

## 2025-06-08 RX ADMIN — METOPROLOL TARTRATE 25 MG: 25 TABLET, FILM COATED ORAL at 09:29

## 2025-06-08 RX ADMIN — HEPARIN SODIUM 5000 UNITS: 5000 INJECTION, SOLUTION INTRAVENOUS; SUBCUTANEOUS at 15:31

## 2025-06-08 RX ADMIN — GABAPENTIN 200 MG: 100 CAPSULE ORAL at 09:29

## 2025-06-08 RX ADMIN — OXYCODONE HYDROCHLORIDE 5 MG: 5 TABLET ORAL at 21:24

## 2025-06-08 RX ADMIN — MONTELUKAST 10 MG: 10 TABLET, FILM COATED ORAL at 21:21

## 2025-06-08 RX ADMIN — DOCUSATE SODIUM 100 MG: 100 CAPSULE, LIQUID FILLED ORAL at 09:29

## 2025-06-08 RX ADMIN — PANTOPRAZOLE SODIUM 40 MG: 40 TABLET, DELAYED RELEASE ORAL at 06:36

## 2025-06-08 RX ADMIN — ATORVASTATIN CALCIUM 10 MG: 20 TABLET, FILM COATED ORAL at 09:29

## 2025-06-08 RX ADMIN — FENOFIBRATE 145 MG: 145 TABLET ORAL at 09:29

## 2025-06-08 RX ADMIN — ACETAMINOPHEN 1000 MG: 500 TABLET, FILM COATED ORAL at 15:31

## 2025-06-08 NOTE — PROGRESS NOTES
"  POST-OPERATIVE NOTE     Chief Complaint: LLL NSCLC  S/P: Robot assisted left lower lobectomy  POD # 2    Subjective  Up to chair. Feeling well. No new concerns      Objective:  General Appearance:  Comfortable and well-appearing.    Vital signs: (most recent): Blood pressure 149/68, pulse 60, temperature 97.9 °F (36.6 °C), temperature source Oral, resp. rate 16, height 167.6 cm (66\"), weight 86.6 kg (191 lb), SpO2 100%.    HEENT: Normal HEENT exam.    Lungs:  Normal effort.  She is not in respiratory distress.    Heart: Normal rate.    Chest: Chest wall tenderness present.    Abdomen: Abdomen is soft.  Bowel sounds are normal.     Extremities: Normal range of motion.    Pulses: Distal pulses are intact.    Skin:  Warm and dry.                Chest tube:   Site: Left, Clean, Dry, Intact, and Securement device intact  Suction: waterseal  Air Leak: negative  24 Hour Total: 150ml    Results Review:     I reviewed the patient's new clinical results.  I reviewed the patient's new imaging results and agree with the interpretation.  Discussed with patient, nursing, surgeon    Assessment & Plan     POD 2 s/p left lower lobectomy. Doing well overall. CXR as expected. No airleak.  Chest tube removed today without difficulty.  Pain is adequately controlled.  Plan to check a final chest x-ray in the morning and discharge home tomorrow pending stable course.      Naomi Whitlock DNP, APRN  [facility]  06/08/25  12:06 EDT    Patient was seen and assessed while wearing personal protective equipment including facemask, protective eyewear and gloves.  Hand hygiene performed prior to entering the room and upon exiting with doffing of gloves.       "

## 2025-06-08 NOTE — PLAN OF CARE
Goal Outcome Evaluation:  Plan of Care Reviewed With: patient        Progress: improving  Outcome Evaluation: Monitor pain,labs,and vitals. VSS. Pain controlled with scheduled medications per orders. Chest tube removed by thoracic today. Up to chair. IS use encouraged. Ambulated on unit.

## 2025-06-08 NOTE — PLAN OF CARE
Problem: Adult Inpatient Plan of Care  Goal: Plan of Care Review  Outcome: Not Progressing  Flowsheets (Taken 6/8/2025 1183)  Progress: improving  Outcome Evaluation: medicated for co pain per mar, resing on and off w eyes closed, no distress noted, chest tube to waterseal no airleak noted,  Plan of Care Reviewed With: patient   Goal Outcome Evaluation:  Plan of Care Reviewed With: patient        Progress: improving  Outcome Evaluation: medicated for co pain per mar, resing on and off w eyes closed, no distress noted, chest tube to waterseal no airleak noted,

## 2025-06-09 ENCOUNTER — APPOINTMENT (OUTPATIENT)
Dept: CARDIOLOGY | Facility: HOSPITAL | Age: 80
DRG: 164 | End: 2025-06-09
Payer: MEDICARE

## 2025-06-09 ENCOUNTER — APPOINTMENT (OUTPATIENT)
Dept: GENERAL RADIOLOGY | Facility: HOSPITAL | Age: 80
DRG: 164 | End: 2025-06-09
Payer: MEDICARE

## 2025-06-09 PROBLEM — I48.0 PAF (PAROXYSMAL ATRIAL FIBRILLATION): Status: ACTIVE | Noted: 2025-06-09

## 2025-06-09 LAB
ANION GAP SERPL CALCULATED.3IONS-SCNC: 8.9 MMOL/L (ref 5–15)
AORTIC ARCH: 2 CM
AORTIC DIMENSIONLESS INDEX: 0.54 (DI)
ASCENDING AORTA: 3.4 CM
AV MEAN PRESS GRAD SYS DOP V1V2: 7 MMHG
AV VMAX SYS DOP: 176 CM/SEC
BH CV ECHO MEAS - ACS: 1.59 CM
BH CV ECHO MEAS - AO MAX PG: 12.4 MMHG
BH CV ECHO MEAS - AO ROOT DIAM: 2.7 CM
BH CV ECHO MEAS - AO V2 VTI: 43.7 CM
BH CV ECHO MEAS - AVA(I,D): 1.5 CM2
BH CV ECHO MEAS - EDV(CUBED): 117.6 ML
BH CV ECHO MEAS - EDV(MOD-SP2): 123 ML
BH CV ECHO MEAS - EDV(MOD-SP4): 126 ML
BH CV ECHO MEAS - EF(MOD-SP2): 56.9 %
BH CV ECHO MEAS - EF(MOD-SP4): 63.5 %
BH CV ECHO MEAS - ESV(CUBED): 35.2 ML
BH CV ECHO MEAS - ESV(MOD-SP2): 53 ML
BH CV ECHO MEAS - ESV(MOD-SP4): 46 ML
BH CV ECHO MEAS - FS: 33.1 %
BH CV ECHO MEAS - IVS/LVPW: 1 CM
BH CV ECHO MEAS - IVSD: 1 CM
BH CV ECHO MEAS - LAT PEAK E' VEL: 4.1 CM/SEC
BH CV ECHO MEAS - LV DIASTOLIC VOL/BSA (35-75): 64.2 CM2
BH CV ECHO MEAS - LV MASS(C)D: 176 GRAMS
BH CV ECHO MEAS - LV MAX PG: 4 MMHG
BH CV ECHO MEAS - LV MEAN PG: 2 MMHG
BH CV ECHO MEAS - LV SYSTOLIC VOL/BSA (12-30): 23.5 CM2
BH CV ECHO MEAS - LV V1 MAX: 100 CM/SEC
BH CV ECHO MEAS - LV V1 VTI: 23.8 CM
BH CV ECHO MEAS - LVIDD: 4.9 CM
BH CV ECHO MEAS - LVIDS: 3.3 CM
BH CV ECHO MEAS - LVOT AREA: 2.8 CM2
BH CV ECHO MEAS - LVOT DIAM: 1.87 CM
BH CV ECHO MEAS - LVPWD: 1 CM
BH CV ECHO MEAS - MED PEAK E' VEL: 4.9 CM/SEC
BH CV ECHO MEAS - MR MAX PG: 77.2 MMHG
BH CV ECHO MEAS - MR MAX VEL: 439.3 CM/SEC
BH CV ECHO MEAS - MV A DUR: 0.11 SEC
BH CV ECHO MEAS - MV A MAX VEL: 125 CM/SEC
BH CV ECHO MEAS - MV DEC SLOPE: 431.1 CM/SEC2
BH CV ECHO MEAS - MV DEC TIME: 0.18 SEC
BH CV ECHO MEAS - MV E MAX VEL: 103 CM/SEC
BH CV ECHO MEAS - MV E/A: 0.82
BH CV ECHO MEAS - MV MAX PG: 6.4 MMHG
BH CV ECHO MEAS - MV MEAN PG: 2.9 MMHG
BH CV ECHO MEAS - MV P1/2T: 72.9 MSEC
BH CV ECHO MEAS - MV V2 VTI: 38 CM
BH CV ECHO MEAS - MVA(P1/2T): 3 CM2
BH CV ECHO MEAS - MVA(VTI): 1.73 CM2
BH CV ECHO MEAS - PULM A REVS DUR: 0.09 SEC
BH CV ECHO MEAS - PULM A REVS VEL: 29.2 CM/SEC
BH CV ECHO MEAS - PULM DIAS VEL: 38.1 CM/SEC
BH CV ECHO MEAS - PULM S/D: 1.68
BH CV ECHO MEAS - PULM SYS VEL: 63.8 CM/SEC
BH CV ECHO MEAS - RAP SYSTOLE: 3 MMHG
BH CV ECHO MEAS - RVOT DIAM: 1.89 CM
BH CV ECHO MEAS - RVSP: 31 MMHG
BH CV ECHO MEAS - SV(LVOT): 65.6 ML
BH CV ECHO MEAS - SV(MOD-SP2): 70 ML
BH CV ECHO MEAS - SV(MOD-SP4): 80 ML
BH CV ECHO MEAS - SVI(LVOT): 33.5 ML/M2
BH CV ECHO MEAS - SVI(MOD-SP2): 35.7 ML/M2
BH CV ECHO MEAS - SVI(MOD-SP4): 40.8 ML/M2
BH CV ECHO MEAS - TAPSE (>1.6): 3 CM
BH CV ECHO MEAS - TR MAX PG: 28.3 MMHG
BH CV ECHO MEAS - TR MAX VEL: 266 CM/SEC
BH CV ECHO MEASUREMENTS AVERAGE E/E' RATIO: 22.89
BH CV XLRA - RV BASE: 3.8 CM
BH CV XLRA - RV LENGTH: 8.2 CM
BH CV XLRA - RV MID: 3.7 CM
BH CV XLRA - TDI S': 20.8 CM/SEC
BUN SERPL-MCNC: 18 MG/DL (ref 8–23)
BUN/CREAT SERPL: 15.1 (ref 7–25)
CALCIUM SPEC-SCNC: 9.1 MG/DL (ref 8.6–10.5)
CHLORIDE SERPL-SCNC: 107 MMOL/L (ref 98–107)
CO2 SERPL-SCNC: 28.1 MMOL/L (ref 22–29)
CREAT SERPL-MCNC: 1.19 MG/DL (ref 0.57–1)
CYTO UR: NORMAL
EGFRCR SERPLBLD CKD-EPI 2021: 47.2 ML/MIN/1.73
GLUCOSE SERPL-MCNC: 85 MG/DL (ref 65–99)
LAB AP CASE REPORT: NORMAL
LAB AP DIAGNOSIS COMMENT: NORMAL
LAB AP SPECIAL STAINS: NORMAL
LAB AP SYNOPTIC CHECKLIST: NORMAL
LEFT ATRIUM VOLUME INDEX: 34.2 ML/M2
LV EF BIPLANE MOD: 60.6 %
MAGNESIUM SERPL-MCNC: 1.8 MG/DL (ref 1.6–2.4)
PATH REPORT.FINAL DX SPEC: NORMAL
PATH REPORT.GROSS SPEC: NORMAL
POTASSIUM SERPL-SCNC: 3.9 MMOL/L (ref 3.5–5.2)
QT INTERVAL: 335 MS
QT INTERVAL: 409 MS
QTC INTERVAL: 436 MS
QTC INTERVAL: 505 MS
SINUS: 3.3 CM
SODIUM SERPL-SCNC: 144 MMOL/L (ref 136–145)
STJ: 2.29 CM

## 2025-06-09 PROCEDURE — 99024 POSTOP FOLLOW-UP VISIT: CPT

## 2025-06-09 PROCEDURE — 83735 ASSAY OF MAGNESIUM: CPT | Performed by: STUDENT IN AN ORGANIZED HEALTH CARE EDUCATION/TRAINING PROGRAM

## 2025-06-09 PROCEDURE — 93306 TTE W/DOPPLER COMPLETE: CPT

## 2025-06-09 PROCEDURE — 80048 BASIC METABOLIC PNL TOTAL CA: CPT | Performed by: STUDENT IN AN ORGANIZED HEALTH CARE EDUCATION/TRAINING PROGRAM

## 2025-06-09 PROCEDURE — 25010000002 HEPARIN (PORCINE) PER 1000 UNITS: Performed by: THORACIC SURGERY (CARDIOTHORACIC VASCULAR SURGERY)

## 2025-06-09 PROCEDURE — 99222 1ST HOSP IP/OBS MODERATE 55: CPT | Performed by: INTERNAL MEDICINE

## 2025-06-09 PROCEDURE — 93306 TTE W/DOPPLER COMPLETE: CPT | Performed by: INTERNAL MEDICINE

## 2025-06-09 PROCEDURE — 93005 ELECTROCARDIOGRAM TRACING: CPT | Performed by: THORACIC SURGERY (CARDIOTHORACIC VASCULAR SURGERY)

## 2025-06-09 PROCEDURE — 93010 ELECTROCARDIOGRAM REPORT: CPT | Performed by: INTERNAL MEDICINE

## 2025-06-09 PROCEDURE — 25010000002 AMIODARONE IN DEXTROSE 5% 150-4.21 MG/100ML-% SOLUTION: Performed by: STUDENT IN AN ORGANIZED HEALTH CARE EDUCATION/TRAINING PROGRAM

## 2025-06-09 PROCEDURE — 25010000002 AMIODARONE IN DEXTROSE 5% 360-4.14 MG/200ML-% SOLUTION: Performed by: STUDENT IN AN ORGANIZED HEALTH CARE EDUCATION/TRAINING PROGRAM

## 2025-06-09 PROCEDURE — 71045 X-RAY EXAM CHEST 1 VIEW: CPT

## 2025-06-09 RX ORDER — AMIODARONE HYDROCHLORIDE 200 MG/1
200 TABLET ORAL
Status: DISCONTINUED | OUTPATIENT
Start: 2025-06-09 | End: 2025-06-10 | Stop reason: HOSPADM

## 2025-06-09 RX ORDER — GABAPENTIN 100 MG/1
200 CAPSULE ORAL EVERY 12 HOURS SCHEDULED
Qty: 120 CAPSULE | Refills: 0 | Status: SHIPPED | OUTPATIENT
Start: 2025-06-09 | End: 2025-07-08 | Stop reason: SDUPTHER

## 2025-06-09 RX ORDER — ACETAMINOPHEN 500 MG
1000 TABLET ORAL 3 TIMES DAILY
Qty: 84 TABLET | Refills: 0 | Status: SHIPPED | OUTPATIENT
Start: 2025-06-09 | End: 2025-06-24

## 2025-06-09 RX ORDER — OXYCODONE HYDROCHLORIDE 5 MG/1
5 TABLET ORAL EVERY 4 HOURS PRN
Qty: 18 TABLET | Refills: 0 | Status: SHIPPED | OUTPATIENT
Start: 2025-06-09 | End: 2025-06-13

## 2025-06-09 RX ADMIN — METOROPROLOL TARTRATE 5 MG: 5 INJECTION, SOLUTION INTRAVENOUS at 04:47

## 2025-06-09 RX ADMIN — PANTOPRAZOLE SODIUM 40 MG: 40 TABLET, DELAYED RELEASE ORAL at 04:47

## 2025-06-09 RX ADMIN — MONTELUKAST 10 MG: 10 TABLET, FILM COATED ORAL at 21:07

## 2025-06-09 RX ADMIN — APIXABAN 5 MG: 5 TABLET, FILM COATED ORAL at 13:05

## 2025-06-09 RX ADMIN — CETIRIZINE HYDROCHLORIDE 10 MG: 10 TABLET ORAL at 08:49

## 2025-06-09 RX ADMIN — ACETAMINOPHEN 1000 MG: 500 TABLET, FILM COATED ORAL at 21:06

## 2025-06-09 RX ADMIN — APIXABAN 5 MG: 5 TABLET, FILM COATED ORAL at 21:12

## 2025-06-09 RX ADMIN — GABAPENTIN 200 MG: 100 CAPSULE ORAL at 08:49

## 2025-06-09 RX ADMIN — SENNOSIDES AND DOCUSATE SODIUM 2 TABLET: 50; 8.6 TABLET ORAL at 21:07

## 2025-06-09 RX ADMIN — AMIODARONE HYDROCHLORIDE 200 MG: 200 TABLET ORAL at 13:05

## 2025-06-09 RX ADMIN — HEPARIN SODIUM 5000 UNITS: 5000 INJECTION, SOLUTION INTRAVENOUS; SUBCUTANEOUS at 04:46

## 2025-06-09 RX ADMIN — ATORVASTATIN CALCIUM 10 MG: 20 TABLET, FILM COATED ORAL at 08:49

## 2025-06-09 RX ADMIN — DOCUSATE SODIUM 100 MG: 100 CAPSULE, LIQUID FILLED ORAL at 21:07

## 2025-06-09 RX ADMIN — ACETAMINOPHEN 1000 MG: 500 TABLET, FILM COATED ORAL at 08:50

## 2025-06-09 RX ADMIN — FOLIC ACID 1 MG: 1 TABLET ORAL at 08:50

## 2025-06-09 RX ADMIN — FENOFIBRATE 145 MG: 145 TABLET ORAL at 08:49

## 2025-06-09 RX ADMIN — METOROPROLOL TARTRATE 5 MG: 5 INJECTION, SOLUTION INTRAVENOUS at 04:16

## 2025-06-09 RX ADMIN — AMIODARONE HYDROCHLORIDE 0.5 MG/MIN: 1.8 INJECTION, SOLUTION INTRAVENOUS at 12:10

## 2025-06-09 RX ADMIN — DOCUSATE SODIUM 100 MG: 100 CAPSULE, LIQUID FILLED ORAL at 08:49

## 2025-06-09 RX ADMIN — GABAPENTIN 200 MG: 100 CAPSULE ORAL at 21:07

## 2025-06-09 RX ADMIN — AMIODARONE HYDROCHLORIDE 150 MG: 1.5 INJECTION, SOLUTION INTRAVENOUS at 06:07

## 2025-06-09 RX ADMIN — AMIODARONE HYDROCHLORIDE 1 MG/MIN: 1.8 INJECTION, SOLUTION INTRAVENOUS at 06:17

## 2025-06-09 NOTE — PLAN OF CARE
Goal Outcome Evaluation:  Patient went into new onset afib with rvr, -140bpm. Asymptomatic. All other VSS. Physician notified, see orders. Pain controlled with oxycodone. Pulmonary hygiene encouraged. Chest xray this am. Monitor labs. Plan of care ongoing.

## 2025-06-09 NOTE — PLAN OF CARE
Goal Outcome Evaluation:              Outcome Evaluation: VSS, d/c with amiodarone 200 mg and eliquis 5mg for 30 days, and zio patch. was in a-fib then went back to sinus, ambulated well throughout the unit

## 2025-06-09 NOTE — CASE MANAGEMENT/SOCIAL WORK
Discharge Planning Assessment  [facility]     Patient Name: Lolis Griffith  MRN: [id number]  Today's Date: 6/9/2025    Admit Date: 6/6/2025    Plan: Home   Discharge Needs Assessment       Row Name 06/09/25 1750       Living Environment    People in Home spouse    Current Living Arrangements home    Primary Care Provided by self    Provides Primary Care For no one    Family Caregiver if Needed spouse    Quality of Family Relationships supportive       Resource/Environmental Concerns    Resource/Environmental Concerns none    Transportation Concerns none       Discharge Needs Assessment    Equipment Currently Used at Home none    Concerns to be Addressed no discharge needs identified    Equipment Needed After Discharge none                   Discharge Plan       Row Name 06/09/25 1751       Plan    Plan Home    Patient/Family in Agreement with Plan yes    Plan Comments Met with pt at bedside. Introduced self, explained CCP role, facesheet verified.  Pt states she lives with spouse and is indpendent with ADLs.  Has walker at home but doesn't use.  Has used HH in past, unsure of agency.  Plans to return home at discharge, no identified needs.  CCP will follow.  AISHA Bishop RN                       Demographic Summary       Row Name 06/09/25 1750       General Information    Admission Type inpatient    Arrived From home    Referral Source admission list    Reason for Consult discharge planning    Preferred Language English       Contact Information    Permission Granted to Share Info With family/designee  Angel Griffith (spouse) [phone]                   Functional Status    No documentation.                  Psychosocial    No documentation.                  Abuse/Neglect    No documentation.                  Legal    No documentation.                  Substance Abuse    No documentation.                  Patient Forms    No documentation.                     Shandra Bishop, FELIPA

## 2025-06-09 NOTE — PAYOR COMM NOTE
"Pau Ortiz (79 y.o. Female)    PLEASE SEE ATTACHED FOR CLINICAL UPDATES    REF#[id number]   THANK YOU  SHE LORENZO RN/ DEPT   [facility]  PH: [phone]  FAX:  [phone]     Date of Birth   1945    Social Security Number       Address   [address] [place] [address]    Home Phone   [phone]    MRN   [id number]       Gnosticist   Orthodoxy    Marital Status                               Admission Date   6/6/2025    Admission Type   Elective    Admitting Provider   Teodora Dozier MD    Attending Provider   Teodora Dozier MD    Department, Room/Bed   [address], E[address]/1       Discharge Date       Discharge Disposition       Discharge Destination                                 Attending Provider: Teodora Dozier MD    Allergies: No Known Allergies    Isolation: None   Infection: None   Code Status: CPR    Ht: 167.6 cm (66\")   Wt: 86.6 kg (191 lb)    Admission Cmt: None   Principal Problem: None                  Active Insurance as of 6/6/2025       Primary Coverage       Payor Plan Insurance Group Employer/Plan Group    AETNA MEDICARE REPLACEMENT AETNA MEDICARE ADVANTAGE PPO [id number]       Payor Plan Address Payor Plan Phone Number Payor Plan Fax Number Effective Dates    [address] [phone]  1/1/2024 - None Entered    [place] [address]         Subscriber Name Subscriber Birth Date Member ID       PAU ORTIZ 1945 [id number]               Secondary Coverage       Payor Plan Insurance Group Employer/Plan Group     FOR LIFE  FOR LIFE MC SUP         Payor Plan Address Payor Plan Phone Number Payor Plan Fax Number Effective Dates    [address] [phone]  10/1/2021 - None Entered    [facility] [id number]         Subscriber Name Subscriber Birth Date Member ID       PAU ORTIZ 1945 [id number]                     Emergency Contacts        (Rel.) Home Phone Work Phone " Mobile Phone    PERFECTO ORTIZ (Spouse) -- -- [phone]              Campos: NPI [id number]  Tax ID [id number]  Medication Administration Report for Lolis Ortiz as of 6/7/25 through 6/9/25     Legend:    Given Hold Not Given Due Canceled Entry Other Actions    Time Time (Time) Time Time-Action         Discontinued     Completed     Future     MAR Hold     Linked             Medications 06/07/25 06/08/25 06/09/25     acetaminophen (TYLENOL) tablet 1,000 mg  Dose: 1,000 mg  Freq: 3 times daily Route: PO  Start: 06/06/25 2100     Admin Instructions:   If given for fever, use fever parameter: fever greater than 100.4 °F  Based on patient request - if ordered for moderate or severe pain, provider allows for administration of a medication prescribed for a lower pain scale.    Do not exceed 4 grams of acetaminophen in a 24 hr period. Max dose of 2gm for AST/ALT greater than 120 units/L.    If given for pain, use the following pain scale:   Mild Pain = Pain Score of 1-3, CPOT 1-2  Moderate Pain = Pain Score of 4-6, CPOT 3-4  Severe Pain = Pain Score of 7-10, CPOT 5-8      0923-Given     1441-Given     2147-Given        0929-Given     1531-Given     2121-Given        0850-Given     1500     2100          amiodarone (PACERONE) tablet 200 mg  Dose: 200 mg  Freq: Every 24 Hours Scheduled Route: PO  Start: 06/09/25 1330     Admin Instructions:   Avoid grapefruit juice while taking this medication.        1305-Given            apixaban (ELIQUIS) tablet 5 mg  Dose: 5 mg  Freq: Every 12 Hours Scheduled Route: PO  Indications Comment: Atrial Fibrillation  Start: 06/09/25 1330     Admin Instructions:   Tablet may be crushed and suspended in 60 mL of water or D5W and immediately delivered via NG tube.        1305-Given     2100           atorvastatin (LIPITOR) tablet 10 mg  Dose: 10 mg  Freq: Daily Route: PO  Start: 06/06/25 1800     Admin Instructions:   Avoid grapefruit juice.      0923-Given          0929-Given           0849-Given            bisacodyl (DULCOLAX) suppository 10 mg  Dose: 10 mg  Freq: Daily PRN Route: RE  PRN Reason: Constipation  Start: 06/06/25 1706     Admin Instructions:   Give if no bowel movement in 24 hours  Hold for diarrhea           cetirizine (zyrTEC) tablet 10 mg  Dose: 10 mg  Freq: Daily Route: PO  Start: 06/06/25 1800      0923-Given          0929-Given          0849-Given            docusate sodium (COLACE) capsule 100 mg  Dose: 100 mg  Freq: 2 Times Daily Route: PO  Start: 06/06/25 2100     Admin Instructions:   Swallow whole.  Do not open, crush, or chew capsule.      (0924)-Not Given     2147-Given         0929-Given     2121-Given         0849-Given     2100           fenofibrate (TRICOR) tablet 145 mg  Dose: 145 mg  Freq: Daily Route: PO  Start: 06/06/25 1900     Admin Instructions:   Take with food.     Order specific questions:   Reason for continuation of original order Elevated triglycerides and pancreatitis      0923-Given          0929-Given          0849-Given            folic acid (FOLVITE) tablet 1 mg  Dose: 1 mg  Freq: Daily Route: PO  Start: 06/06/25 1800      0923-Given          0929-Given          0850-Given            gabapentin (NEURONTIN) capsule 200 mg  Dose: 200 mg  Freq: Every 12 Hours Scheduled Route: PO  Start: 06/07/25 2100     Admin Instructions:   (MOISÉS)      2146-Given          0929-Given     2121-Given         0849-Given     2100           HYDROmorphone (DILAUDID) injection 0.5 mg  Dose: 0.5 mg  Freq: Every 2 Hours PRN Route: IV  PRN Reason: Severe Pain  Start: 06/06/25 1706 End: 06/11/25 1705     Admin Instructions:   Based on patient request - if ordered for moderate or severe pain, provider allows for administration of a medication prescribed for a lower pain scale.  If given for pain, use the following pain scale:  Mild Pain = Pain Score of 1-3, CPOT 1-2  Moderate Pain = Pain Score of 4-6, CPOT 3-4  Severe Pain = Pain Score of 7-10, CPOT 5-8           magnesium  "hydroxide (MILK OF MAGNESIA) suspension 10 mL  Dose: 10 mL  Freq: Daily PRN Route: PO  PRN Reason: Constipation  Start: 06/06/25 1706           montelukast (SINGULAIR) tablet 10 mg  Dose: 10 mg  Freq: Nightly Route: PO  Start: 06/06/25 2100      2147-Given          2121-Given          2100            nitroglycerin (NITROSTAT) SL tablet 0.4 mg  Dose: 0.4 mg  Freq: Every 5 Minutes PRN Route: SL  PRN Reason: Chest Pain  Start: 06/06/25 1706     Admin Instructions:   Notify Provider if Pain Unrelieved After 3 Doses  May administer up to 3 doses per episode. Hold if SBP less than 100.           nitroglycerin (NITROSTAT) SL tablet 0.4 mg  Dose: 0.4 mg  Freq: Every 5 Minutes PRN Route: SL  PRN Reason: Chest Pain  Start: 06/06/25 1706     Admin Instructions:   Notify Provider if Pain Unrelieved After 3 Doses  May administer up to 3 doses per episode. Hold if SBP less than 100.           ondansetron (ZOFRAN) tablet 8 mg  Dose: 8 mg  Freq: 3 Times Daily PRN Route: PO  PRN Reasons: Nausea,Vomiting  Start: 06/06/25 1706     Admin Instructions:   \"If multiple N/V medications ordered, use in the following order: Ondansetron, Prochlorperazine, Promethazine. Use PO unless patient refuses or patient unable to swallow.\"           oxyCODONE (ROXICODONE) immediate release tablet 5 mg  Dose: 5 mg  Freq: Every 4 Hours PRN Route: PO  PRN Reason: Moderate Pain  Start: 06/06/25 1706 End: 06/11/25 1705     Admin Instructions:   Based on patient request - if ordered for moderate or severe pain, provider allows for administration of a medication prescribed for a lower pain scale.  (MOISÉS)  If given for pain, use the following pain scale:  Mild Pain = Pain Score of 1-3, CPOT 1-2  Moderate Pain = Pain Score of 4-6, CPOT 3-4  Severe Pain = Pain Score of 7-10, CPOT 5-8      1011-Given     1441-Given     2146-Given        0635-Given     2124-Given            pantoprazole (PROTONIX) EC tablet 40 mg  Dose: 40 mg  Freq: Every Early Morning Route: " PO  Start: 06/07/25 0600     Admin Instructions:   Swallow whole; do not crush, split, or chew.      0529-Given          0636-Given          0447-Given     0600-Canceled Entry           sennosides-docusate (PERICOLACE) 8.6-50 MG per tablet 2 tablet  Dose: 2 tablet  Freq: Nightly Route: PO  Start: 06/06/25 2100      2147-Given          2121-Given          2100            Completed Medications  Medications 06/07/25 06/08/25 06/09/25      acetaminophen (TYLENOL) tablet 1,000 mg  Dose: 1,000 mg  Freq: Once Route: PO  Start: 06/06/25 0943 End: 06/06/25 1024     Admin Instructions:   Based on patient request - if ordered for moderate or severe pain, provider allows for administration of a medication prescribed for a lower pain scale.    Do not exceed 4 grams of acetaminophen in a 24 hr period. Max dose of 2gm for AST/ALT greater than 120 units/L.    If given for pain, use the following pain scale:   Mild Pain = Pain Score of 1-3, CPOT 1-2  Moderate Pain = Pain Score of 4-6, CPOT 3-4  Severe Pain = Pain Score of 7-10, CPOT 5-8            amiodarone 150 mg in 100 mL D5W (loading dose)  Dose: 150 mg  Freq: Once Route: IV  Start: 06/09/25 0630 End: 06/09/25 0617     Admin Instructions:   Central line preferred, if unavailable use large bore IV access with frequent nurse monitoring of IV site.  IV med requiring filtration 0.22 micron inline filter.        0607-New Bag            Followed by   amiodarone 360 mg in 200 mL D5W infusion  Rate: 33.3 mL/hr Dose: 1 mg/min  Freq: Continuous Route: IV  Start: 06/09/25 0630 End: 06/09/25 1216     Admin Instructions:   Use in-line filter.    Central line preferred, if unavailable use large bore IV access with frequent nurse monitoring of IV site.  IV med requiring filtration 0.22 micron inline filter.        0617-New Bag     0618-Canceled Entry     1210-Stopped [C]          Followed by   amiodarone 360 mg in 200 mL D5W infusion  Rate: 16.67 mL/hr Dose: 0.5 mg/min  Freq: Continuous Route:  IV  Start: 06/09/25 1217 End: 06/09/25 1236     Admin Instructions:   Use in-line filter.  Central line preferred, if unavailable use large bore IV access with frequent nurse monitoring of IV site.  IV med requiring filtration 0.22 micron inline filter.        1210-New Bag     1307-Stopped           ceFAZolin 2000 mg IVPB in 100 mL NS (MBP)  Dose: 2,000 mg  Freq: Once Route: IV  Indications of Use: PERIOPERATIVE PHARMACOPROPHYLAXIS  Start: 06/06/25 0943 End: 06/06/25 1207     Admin Instructions:   Administer within 1 hour of surgical incision. Redose 4 hours from pre-op dose if procedure ongoing or >1.5 L blood loss.  Caution: Look alike/sound alike drug alert           celecoxib (CeleBREX) capsule 200 mg  Dose: 200 mg  Freq: Once Route: PO  Start: 06/06/25 0943 End: 06/06/25 1024     Admin Instructions:   Caution: Look alike/sound alike drug alert.  Take with food if GI upset occurs.  If given for pain, use the following pain scale:  Mild Pain = Pain Score of 1-3, CPOT 1-2  Moderate Pain = Pain Score of 4-6, CPOT 3-4  Severe Pain = Pain Score of 7-10, CPOT 5-8           famotidine (PEPCID) injection 20 mg  Dose: 20 mg  Freq: Once Route: IV  Start: 06/06/25 0955 End: 06/06/25 1044     Admin Instructions:   Give IV push over 2 minutes.           fentaNYL citrate (PF) (SUBLIMAZE) injection  Freq: Code / Trauma / Sedation Medication Route: IV  Start: 06/06/25 1108 End: 06/06/25 1108           gabapentin (NEURONTIN) capsule 300 mg  Dose: 300 mg  Freq: Once Route: PO  Start: 06/06/25 0943 End: 06/06/25 1024     Admin Instructions:              heparin (porcine) 5000 UNIT/ML injection 5,000 Units  Dose: 5,000 Units  Freq: Once Route: SC  Indications of Use: PROPHYLAXIS OF VENOUS THROMBOEMBOLISM  Start: 06/06/25 0943 End: 06/06/25 1058           metoprolol tartrate (LOPRESSOR) injection 5 mg  Dose: 5 mg  Freq: Every 15 Minutes PRN Route: IV  PRN Reason: Heart Rate  PRN Comment: give 2nd dose if HR remains >100bpm.  Start:  06/09/25 0406 End: 06/09/25 0447     Admin Instructions:   Hold for SBP less than 100, DBP less than 60, or heart rate less than 50. If a dose is held, please contact the provider.        0416-Given     0447-Given           Discontinued Medications  Medications 06/07/25 06/08/25 06/09/25      bupivacaine (PF) 0.25 % 10 mL, bupivacaine liposome 10 mL mixture  Freq: As Needed  Start: 06/06/25 1255 End: 06/06/25 1500           celecoxib (CeleBREX) capsule 100 mg  Dose: 100 mg  Freq: Every 12 Hours Scheduled Route: PO  Start: 06/06/25 2100 End: 06/07/25 1040     Admin Instructions:   Take with food if GI upset occurs. Based on patient request - if ordered for moderate or severe pain, provider allows for administration of a medication prescribed for a lower pain scale.  If given for pain, use the following pain scale:  Mild Pain = Pain Score of 1-3, CPOT 1-2  Moderate Pain = Pain Score of 4-6, CPOT 3-4  Severe Pain = Pain Score of 7-10, CPOT 5-8      0923-Given              cetirizine (zyrTEC) tablet 10 mg  Dose: 10 mg  Freq: Nightly Route: PO  Start: 06/06/25 2100 End: 06/06/25 1722            diphenhydrAMINE (BENADRYL) capsule 25 mg  Dose: 25 mg  Freq: Every 4 Hours PRN Route: PO  PRN Reason: Itching  Start: 06/06/25 1518 End: 06/06/25 1652     Admin Instructions:   Caution: Look alike/sound alike drug alert. This med may be ordered in other forms and routes. Before giving verify the last time the drug was given by any route/form.           Or   diphenhydrAMINE (BENADRYL) injection 25 mg  Dose: 25 mg  Freq: Every 4 Hours PRN Route: IV  PRN Reason: Itching  Start: 06/06/25 1518 End: 06/06/25 1652     Admin Instructions:   Caution: Look alike/sound alike drug alert. This med may be ordered in other forms and routes. Before giving verify the last time the drug was given by any route/form.           Or   diphenhydrAMINE (BENADRYL) injection 25 mg  Dose: 25 mg  Freq: Every 4 Hours PRN Route: IM  PRN Reason: Itching  Start:  06/06/25 1518 End: 06/06/25 1652     Admin Instructions:   Caution: Look alike/sound alike drug alert. This med may be ordered in other forms and routes. Before giving verify the last time the drug was given by any route/form.            droperidol (INAPSINE) injection 0.625 mg  Dose: 0.625 mg  Freq: Every 20 Minutes PRN Route: IV  PRN Reasons: Nausea,Vomiting  Indications of Use: NAUSEA AND VOMITING  Start: 06/06/25 1518 End: 06/06/25 1652     Admin Instructions:   Use ondansetron first; proceed to droperidol for nausea refractory to ondansetron.           Or   droperidol (INAPSINE) injection 0.625 mg  Dose: 0.625 mg  Freq: Every 20 Minutes PRN Route: IM  PRN Reasons: Nausea,Vomiting  Start: 06/06/25 1518 End: 06/06/25 1652     Admin Instructions:   Use ondansetron first; proceed to droperidol for nausea refractory to ondansetron.           fentaNYL citrate (PF) (SUBLIMAZE) injection 50 mcg  Dose: 50 mcg  Freq: Once As Needed Route: IV  PRN Reason: Severe Pain  Start: 06/06/25 0953 End: 06/06/25 1529     Admin Instructions:   Maximum total dose of fentanyl is 50 mcg without additional orders from physician. May be used for preoperative pain or procedural sedation.  If given for pain, use the following pain scale:  Mild Pain = Pain Score of 1-3, CPOT 1-2  Moderate Pain = Pain Score of 4-6, CPOT 3-4  Severe Pain = Pain Score of 7-10, CPOT 5-8           flumazenil (ROMAZICON) injection 0.2 mg  Dose: 0.2 mg  Freq: As Needed Route: IV  PRN Comment: unresponsiveness  Indications of Use: BENZODIAZEPINE TOXICITY  Start: 06/06/25 1518 End: 06/06/25 1652     Admin Instructions:   ** give IV over 15-30 seconds **           gabapentin (NEURONTIN) capsule 300 mg  Dose: 300 mg  Freq: Every 8 Hours Scheduled Route: PO  Start: 06/06/25 2200 End: 06/07/25 1043     Admin Instructions:   (MOISÉS)      0529-Given              heparin (porcine) 5000 UNIT/ML injection 5,000 Units  Dose: 5,000 Units  Freq: Every 8 Hours Scheduled Route:  SC  Indications of Use: PROPHYLAXIS OF VENOUS THROMBOEMBOLISM  Start: 06/07/25 0600 End: 06/09/25 1236      0529-Given     1441-Given     2147-Given        0635-Given     1531-Given     2121-Given        0446-Given     0600-Canceled Entry           hydrALAZINE (APRESOLINE) injection 5 mg  Dose: 5 mg  Freq: Every 10 Minutes PRN Route: IV  PRN Reason: High Blood Pressure  PRN Comment: for systolic blood pressure greater than 180 mmHg or diastolic blood pressure greater than 105 mmHg  Start: 06/06/25 1518 End: 06/06/25 1652     Admin Instructions:   Up to 20 mg.  If labetalol and hydralazine are both ordered, use labetalol first.  Caution: Look alike/sound alike drug alert           HYDROmorphone (DILAUDID) injection 0.25 mg  Dose: 0.25 mg  Freq: Every 30 Minutes PRN Route: IV  PRN Reasons: Moderate Pain,Severe Pain  Start: 06/06/25 1518 End: 06/06/25 1652     Admin Instructions:   If given for pain, use the following pain scale:  Mild Pain = Pain Score of 1-3, CPOT 1-2  Moderate Pain = Pain Score of 4-6, CPOT 3-4  Severe Pain = Pain Score of 7-10, CPOT 5-8           ipratropium-albuterol (DUO-NEB) nebulizer solution 3 mL  Dose: 3 mL  Freq: Every 8 Hours - RT Route: NEBULIZATION  Start: 06/06/25 1800 End: 06/08/25 1459     Admin Instructions:   Include Respiratory Treatment Education      0625-Given     1423-Given     2230-Given        0703-Given             ipratropium-albuterol (DUO-NEB) nebulizer solution 3 mL  Dose: 3 mL  Freq: Once As Needed Route: NEBULIZATION  PRN Reasons: Wheezing,Shortness of Air  PRN Comment: bronchospasm  Start: 06/06/25 1518 End: 06/06/25 1652     Admin Instructions:   Include Respiratory Treatment Education           ketamine hcl syringe solution prefilled syringe 10 mg  Dose: 10 mg  Freq: Every 10 Minutes PRN Route: IV  PRN Comment: Post operative pain and breakthrough pain  Start: 06/06/25 1518 End: 06/06/25 1652     Admin Instructions:   Give ketamine first line for pain in ERAS cases.  Maximum total dose of ketamine is 50 mg.           labetalol (NORMODYNE,TRANDATE) injection 5 mg  Dose: 5 mg  Freq: Every 5 Minutes PRN Route: IV  PRN Reason: High Blood Pressure  PRN Comment: for systolic blood pressure greater than 180 mmHg or diastolic blood pressure greater than 105 mmHg  Start: 06/06/25 1518 End: 06/06/25 1652     Admin Instructions:   Hold for heart rate less than 60. If labetalol and hydralazine are both ordered, use labetalol first. Max dose of labetalol in PACU is 20mg. Notify anesthesiologist if maximum dose reached.  Give IV Push over 2 minutes.           lactated ringers infusion  Rate: 9 mL/hr Dose: 9 mL/hr  Freq: Continuous Route: IV  Start: 06/06/25 0955 End: 06/07/25 1042           lidocaine (XYLOCAINE) 1 % injection 0.5 mL  Dose: 0.5 mL  Freq: Once As Needed Route: ID  PRN Comment: IV Start  Start: 06/06/25 0953 End: 06/06/25 1529           metoclopramide (REGLAN) injection 10 mg  Dose: 10 mg  Freq: Once As Needed Route: IV  PRN Reasons: Nausea,Vomiting  Start: 06/06/25 1518 End: 06/06/25 1652     Admin Instructions:   Give 1 post-op dose only, if needed (unless given in OR).  If BOTH ondansetron (ZOFRAN) and metoclopramide (REGLAN) are ordered use ondansetron first and THEN metoclopramide IF ondansetron is ineffective.  Doses of 10 mg or less can be given IV push undiluted over 1 to 2 minutes           metoprolol tartrate (LOPRESSOR) tablet 25 mg  Dose: 25 mg  Freq: Every 12 Hours Scheduled Route: PO  Start: 06/06/25 2100 End: 06/09/25 1236     Admin Instructions:   Hold if systolic blood pressure less than 90 or heart rate less than 60      0923-Given     2146-Given         0929-Given     2121-Given         (0934)-Not Given            midazolam (VERSED) injection 0.5 mg  Dose: 0.5 mg  Freq: Every 5 Minutes PRN Route: IV  PRN Comment: Anxiety prophylaxis, Pre-op comfort  Start: 06/06/25 0953 End: 06/06/25 1529     Admin Instructions:   May repeat dose in 5 minutes one time then  contact provider for additional orders.  (MOISÉS)           naloxone (NARCAN) injection 0.4 mg  Dose: 0.4 mg  Freq: Every 5 Minutes PRN Route: IV  PRN Reasons: Opioid Reversal,Respiratory Depression  Indications of Use: OPIOID-INDUCED RESPIRATORY DEPRESSION  Start: 06/06/25 1518 End: 06/06/25 1652           naloxone (NARCAN) injection 0.4 mg  Dose: 0.4 mg  Freq: As Needed Route: IV  PRN Reason: Opioid Reversal  PRN Comment: unresponsiveness, decrease oxygen saturation  Start: 06/06/25 1518 End: 06/06/25 1652           ondansetron (ZOFRAN) injection 4 mg  Dose: 4 mg  Freq: Once As Needed Route: IV  PRN Reasons: Nausea,Vomiting  Start: 06/06/25 1518 End: 06/06/25 1652     Admin Instructions:   Give 1 post-op dose only, if needed (unless given in OR).  If BOTH ondansetron (ZOFRAN) and metoclopramide (REGLAN) are ordered use ondansetron first and THEN metoclopramide IF ondansetron is ineffective.           oxyCODONE (ROXICODONE) immediate release tablet 5 mg  Dose: 5 mg  Freq: Once As Needed Route: PO  PRN Reasons: Moderate Pain,Severe Pain  Start: 06/06/25 1518 End: 06/06/25 1652     Admin Instructions:   (MOISÉS)  If given for pain, use the following pain scale:  Mild Pain = Pain Score of 1-3, CPOT 1-2  Moderate Pain = Pain Score of 4-6, CPOT 3-4  Severe Pain = Pain Score of 7-10, CPOT 5-8           sodium chloride 0.9 % flush 3 mL  Dose: 3 mL  Freq: Every 12 Hours Scheduled Route: IV  Start: 06/06/25 0955 End: 06/06/25 1529           sodium chloride 0.9 % flush 3 mL  Dose: 3 mL  Freq: Every 12 Hours Scheduled Route: IV  Start: 06/06/25 0943 End: 06/06/25 1529           sodium chloride 0.9 % flush 3-10 mL  Dose: 3-10 mL  Freq: As Needed Route: IV  PRN Reason: Line Care  Start: 06/06/25 0953 End: 06/06/25 1529           sodium chloride 0.9 % flush 3-10 mL  Dose: 3-10 mL  Freq: As Needed Route: IV  PRN Reason: Line Care  Start: 06/06/25 0941 End: 06/06/25 1529           sodium chloride 0.9 % infusion  Rate: 75 mL/hr Dose: 75  mL/hr  Freq: Continuous Route: IV  Start: 06/06/25 1800 End: 06/07/25 0527      0529-Stopped [C]              sodium chloride 0.9 % infusion 40 mL  Dose: 40 mL  Freq: As Needed Route: IV  PRN Reason: Line Care  Start: 06/06/25 0941 End: 06/06/25 1529     Admin Instructions:   Following administration of an IV intermittent medication, flush line with 40mL NS at 100mL/hr.           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.30]  Malignant neoplasm of upper lobe, left bronchus or lung [C34.12]       Post-Op Diagnosis Codes:     * NSCLC of lower lobe [C34.30]     * Malignant neoplasm of upper lobe, left bronchus or lung [C34.12]    Procedure(s):      Procedure(s):  THORACOSCOPY WITH LOBECTOMY WITH DAVINCI ROBOT, mediastinal and pilar lymph node dissection, intercostal nerve blocks, bronchoscopy      Synoptic portion:    Intent:  curative    Surgical procedure performed:  Resection of at least one lobe or bilobectomy - Lobectomy WITH mediastinal lymph node dissection    Mediastinal lymph node stations resected:  5 Subaortic, 7 Subcarinal, 8 Paraesophageal (below paige), and 9 Pulmonary ligament    Hilar lymph node station(s) resected:  10 Hilar and 11 Interlobar          Surgeon(s):  Teodora Dozier MD    Anesthesia: General with Block    Staff:   Circulator: Concepcion Avlaos RN; Ese Caraballo RN  Scrub Person: Masoud Gregory Liliana G  Assistant: Freddy Brown CSA  Assistant: Freddy Brown CSA    Estimated Blood Loss: 100ml    Urine Voided: * No values recorded between 6/6/2025 11:47 AM and 6/6/2025  2:45 PM *    Specimens:                Specimens       ID Source Type Tests Collected By  Collected At Frozen?    A Lung, L Tissue TISSUE PATHOLOGY EXAM   Teodora Dozier MD 6/6/25 1246 No    Description: Left level 8 lymph node-fresh for permanent    B Lung, L Tissue TISSUE PATHOLOGY EXAM   Teodora Dozier MD 6/6/25 1246 No    Description: Left level 9 lymph node-fresh for permanent    C Lung, L Tissue TISSUE PATHOLOGY EXAM   Teodora Dozier MD 6/6/25 1246 No    Description: level 7 lymph node-fresh for permanent    D Lung, L Tissue TISSUE PATHOLOGY EXAM   Teodora Dozier MD 6/6/25 1254 No    Description: level 7 number 2 lymph node-fresh for permanent    E Lung, L Tissue TISSUE PATHOLOGY EXAM   Teodora Dozier MD 6/6/25 1257 No    Description: left level 5 lymph node-fresh for permanent    F Lung, L Tissue TISSUE PATHOLOGY EXAM   Teodora Dozier MD 6/6/25 1330 No    Description: left level 10 lymph node-fresh for permanent    G Lung, L Tissue TISSUE PATHOLOGY EXAM   Teodora Dozier MD 6/6/25 1427 No    Description: left level 11 lymph node-fresh for permanent    H Lung, L Tissue TISSUE PATHOLOGY EXAM   Teodora Dozier MD 6/6/25 1427 No    Description: left lower lung lobectomy-fresh for permanent              Drains:   Chest Tube Left Midaxillary (Active)       [REMOVED] Urethral Catheter Silicone 16 Fr. (Removed)       Findings: Enlarged mediastinal lymph nodes      Complications: None apparent    Assistant: Freddy Brown CSA  was responsible for performing the following activities: Retraction, Suction, Closing, Placing Dressing, and Held/Positioned Camera and their skilled assistance was necessary for the success of this case.    Teodora Dozier MD     Date: 6/6/2025  Time: 14:47 EDT          Electronically signed by Teodora Dozier MD at 06/06/25 1448          Physician Progress Notes (last 48 hours)        Marielena Young, COMPA, APRN at 06/09/25 1124       Attestation signed by Teodora Dozier MD at 06/09/25 1402      I have reviewed this documentation and agree.                         POST-OPERATIVE NOTE     Chief Complaint: LLL NSCLC  S/P: Robot assisted left lower lobectomy   POD # 3    Subjective  Reports asymptomatic during new onset afib episode overnight.  Denies chest pain, shortness of breath or palpitations.  Currently back in NSR.  Blood pressure remains stable.  Pain well controlled, room air.    Objective:  General appearance: Comfortable, in no acute distress  Vital signs: Blood pressure 99/58, pulse 57, temperature 97.8 °F temperature source Oral, resp. rate 18, SpO2 99% room air.    Lungs: Normal effort and normal respiratory rate.  Heart: Normal rate.  Regular rhythm.    Chest: Symmetric chest wall expansion. Duoderm covering old chest tube site c/d.  Abdomen: Abdomen is soft, non-distended   Neurological: Patient is alert and oriented to person, place and time.    Skin:  Warm and dry.     Results Review:    I have reviewed the patient's most recent labs and imaging.    Assessment & Plan   POD 3 s/p left lower lobectomy, doing well overall with exception of new onset afib with rapid rate overnight.  She was started on amiodarone gtt and cardiology consult is pending.  She is back in NSR on exam.      Pain tolerable, continue current regimen.  Continue scheduled tylenol, gabapentin.  PRN oxycodone, dilaudid IV for severe pain.  Doing well with pulmonary hygiene measures, I.S.  Ambulating around nursing station 3x daily with assistance, up to chair.  Continues to tolerate on room air.      CXR this am is stable s/p chest tube removal yesterday.  Creatinine uptrended slightly today but stable, will plan for repeat labs AM.    Appreciate cardiology recommendations, will plan to keep patient again overnight to ensure heart rate is adequately controlled prior to discharge home, possibly tomorrow pending course.  Ok from our standpoint for therapeutic anticoagulation if recommended by cards.      Discussed with Dr Dozier, patient and family at bedside, RN    Marielena Young DNP,  "APRN  [facility]  06/09/25  11:24 EDT    Patient was seen and assessed while wearing personal protective equipment including facemask, protective eyewear and gloves.  Hand hygiene performed prior to entering the room and upon exiting with doffing of gloves.          Electronically signed by Teodora Dozier MD at 06/09/25 1402       Naomi Whitlock DNP, APRN at 06/08/25 1206       Attestation signed by Yanira Goodwin MD at 06/08/25 1342      Doing very well postoperatively.  Tube removed today.  Potentially be ready for discharge soon  I have reviewed this documentation and agree.                        POST-OPERATIVE NOTE     Chief Complaint: LLL NSCLC  S/P: Robot assisted left lower lobectomy  POD # 2    Subjective  Up to chair. Feeling well. No new concerns      Objective:  General Appearance:  Comfortable and well-appearing.    Vital signs: (most recent): Blood pressure 149/68, pulse 60, temperature 97.9 °F (36.6 °C), temperature source Oral, resp. rate 16, height 167.6 cm (66\"), weight 86.6 kg (191 lb), SpO2 100%.    HEENT: Normal HEENT exam.    Lungs:  Normal effort.  She is not in respiratory distress.    Heart: Normal rate.    Chest: Chest wall tenderness present.    Abdomen: Abdomen is soft.  Bowel sounds are normal.     Extremities: Normal range of motion.    Pulses: Distal pulses are intact.    Skin:  Warm and dry.                Chest tube:   Site: Left, Clean, Dry, Intact, and Securement device intact  Suction: waterseal  Air Leak: negative  24 Hour Total: 150ml    Results Review:     I reviewed the patient's new clinical results.  I reviewed the patient's new imaging results and agree with the interpretation.  Discussed with patient, nursing, surgeon    Assessment & Plan     POD 2 s/p left lower lobectomy. Doing well overall. CXR as expected. No airleak.  Chest tube removed today without difficulty.  Pain is adequately controlled.  Plan to check a final chest x-ray in the morning " and discharge home tomorrow pending stable course.      Naomi Whitlock, COMPA, APRN  [facility]  25  12:06 EDT    Patient was seen and assessed while wearing personal protective equipment including facemask, protective eyewear and gloves.  Hand hygiene performed prior to entering the room and upon exiting with doffing of gloves.         Electronically signed by Yanira Goodwin MD at 25 1342          Consult Notes (last 48 hours)        Trevon Miranda MD at 25 1226        Consult Orders    1. Inpatient Cardiology Consult [[id number]] ordered by Yanira Goodwin MD at 25 [phone]                 Date of Hospital Visit: 25  Encounter Provider: Trevon Miranda MD  Place of Service: [facility] CARDIOLOGY  Patient Name: Lolis Griffith  :1945  Referral Provider: Teodora Dozier MD    Chief complaint: lung cancer    History of Present Illness    Mrs Lolis Griffith is a 78yo woman with CKD3, HTN, and COPD who is s/p left lower lobectomy 3 days ago. She is s/p neoadjuvant chemotherapy. She has done beautifully after surgery; all tubes/drains are out. Last night she went into atrial fibrillation with rapid rates. It was asymptomatic.     She is normally very active and denies chest pain, palps, LH, or syncope. She has chronic stable TORRES. She had an outpatient perfusion stress test that I personally reviewed. It was technically difficult but the attenuation corrected images show no evidence of infarct or ischemia. Her LVEF was normal.    Past Medical History:   Diagnosis Date    CKD (chronic kidney disease), stage III     COPD (chronic obstructive pulmonary disease)     PT DENIES THIS    GERD (gastroesophageal reflux disease)     History of chemotherapy     LAST TREATMENT 5-15-25    Hypertension     Lung nodules     Malignant neoplasm of lower lobe of left lung     Other hyperlipidemia     Pancreas disorder     states scanned yearly ot monitor  "\"fatty pancreas\"    Pneumonia     Port-A-Cath in place     RIGHT CHEST    Seasonal allergies        Past Surgical History:   Procedure Laterality Date    BLADDER REPAIR      BRONCHOSCOPY WITH ION ROBOTIC ASSIST Left 01/30/2025    Procedure: BRONCHOSCOPY WITH ION ROBOT: BAL, REBUS, NEEDLE ASPIRATE, BIOPSIES insertion of lighted robot navigated instrument to view inside the lung;  Surgeon: Stevie Carson DO;  Location: [facility] MAIN OR;  Service: Robotics - Pulmonary;  Laterality: Left;    EYE SURGERY Bilateral     CATARACTS    LAPAROSCOPIC CHOLECYSTECTOMY      PARATHYROIDECTOMY      RECTAL PROLAPSE REPAIR  2021    TOTAL ABDOMINAL HYSTERECTOMY WITH SALPINGO OOPHORECTOMY      TOTAL HIP ARTHROPLASTY Left     VENOUS ACCESS DEVICE (PORT) INSERTION N/A 03/04/2025    Procedure: INSERTION VENOUS ACCESS DEVICE: Placement of a port in the tissue under the skin at your upper chest with a tube attached to it that goes into your vein;  Surgeon: Abhinav Martin MD;  Location: [facility] OR [facility] [place], [place];  Service: General;  Laterality: N/A;       Prior to Admission medications    Medication Sig Start Date End Date Taking? Authorizing Provider   cetirizine (zyrTEC) 10 MG tablet Take 1 tablet by mouth Every Night.   Yes ProviderBenoit MD   Docusate Sodium 100 MG capsule Take 1 tablet by mouth Every Night.   Yes ProviderBenoit MD   ezetimibe (ZETIA) 10 MG tablet Take 1 tablet by mouth Every Night.   Yes Benoit Blackburn MD   fenofibrate (TRICOR) 145 MG tablet Take 1 tablet by mouth Daily. 1/21/22  Yes Benoit Blackburn MD   ferrous sulfate 324 (65 Fe) MG tablet delayed-release EC tablet Take 1 tablet by mouth Every Night.   Yes Benoit Blackburn MD   lisinopril (PRINIVIL,ZESTRIL) 5 MG tablet Take 1 tablet by mouth Daily. 2/18/25  Yes Teodora Dozier MD   loratadine (CLARITIN) 10 MG tablet Take 1 tablet by mouth Daily. 1/9/24  Yes Benoit Blackburn MD   montelukast (SINGULAIR) 10 MG tablet Take 1 tablet by " mouth Every Night.   Yes Benoit Balckburn MD   omeprazole (priLOSEC) 40 MG capsule Take 1 capsule by mouth Daily As Needed.   Yes Benoit Blackburn MD   ondansetron (ZOFRAN) 8 MG tablet Take 1 tablet by mouth 3 (Three) Times a Day As Needed for Nausea or Vomiting. 3/5/25  Yes Betito Benavidez PA-C   simvastatin (ZOCOR) 20 MG tablet Take 1 tablet by mouth Daily.   Yes Benoit Blackburn MD   Cholecalciferol 125 MCG (5000 UT) tablet Take 1 tablet by mouth Daily.    Benoit Blackburn MD   estradiol (ESTRACE) 0.1 MG/GM vaginal cream 1 g 3 (Three) Times a Week. 3/8/22   Benoit Blackburn MD   folic acid (FOLVITE) 1 MG tablet Take 1 tablet by mouth Daily. Start at least 7 days prior to chemotherapy until at least 3 weeks after all chemotherapy. 3/5/25   Betito Benavidez PA-C   magnesium gluconate (MAGONATE) 500 MG tablet Take 0.5 tablets by mouth Every Night.    Benoit Blackburn MD   Unable to find Take 1 each by mouth Daily. PROBIOTIC PREBIOTICS AND CRANBERRY    Benoit Blackburn MD       Social History     Socioeconomic History    Marital status:    Tobacco Use    Smoking status: Never     Passive exposure: Never    Smokeless tobacco: Never   Vaping Use    Vaping status: Never Used   Substance and Sexual Activity    Alcohol use: Not Currently    Drug use: Never    Sexual activity: Defer       Family History   Problem Relation Age of Onset    Cancer Brother     Malig Hyperthermia Neg Hx        Review of Systems   Constitutional:  Positive for fatigue.   Respiratory:  Positive for shortness of breath.         Objective:     Vitals:    06/09/25 0630 06/09/25 0645 06/09/25 0715 06/09/25 1115   BP: 102/63 102/64 99/58 134/61   BP Location:   Right arm Right arm   Patient Position:   Lying Lying   Pulse: 109 113 110 54   Resp:   18 18   Temp:   97.8 °F (36.6 °C) 97.9 °F (36.6 °C)   TempSrc:   Oral Oral   SpO2: 98% 98% 99% 96%   Weight:       Height:         Body mass index is 30.83  "kg/m².  Flowsheet Rows      Flowsheet Row First Filed Value   Admission Height 167.6 cm (66\") Documented at 06/06/2025 0955   Admission Weight 86.6 kg (191 lb) Documented at 06/06/2025 1730            Physical Exam  Vitals reviewed.   Constitutional:       Appearance: She is well-developed.   HENT:      Head: Normocephalic.      Nose: Nose normal.   Eyes:      Conjunctiva/sclera: Conjunctivae normal.   Neck:      Vascular: No JVD.   Cardiovascular:      Rate and Rhythm: Normal rate and regular rhythm.      Pulses: Normal pulses.      Heart sounds: Normal heart sounds.   Pulmonary:      Effort: Pulmonary effort is normal.      Breath sounds: Normal breath sounds.   Abdominal:      Palpations: Abdomen is soft.      Tenderness: There is no abdominal tenderness.   Musculoskeletal:         General: No swelling. Normal range of motion.      Cervical back: Normal range of motion.   Skin:     General: Skin is warm and dry.      Findings: No erythema.   Neurological:      General: No focal deficit present.      Mental Status: She is alert.   Psychiatric:         Mood and Affect: Mood normal.                 Lab Review:                Results from last 7 days   Lab Units 06/09/25  0917   SODIUM mmol/L 144   POTASSIUM mmol/L 3.9   CHLORIDE mmol/L 107   CO2 mmol/L 28.1   BUN mg/dL 18.0   CREATININE mg/dL 1.19*   GLUCOSE mg/dL 85   CALCIUM mg/dL 9.1         Results from last 7 days   Lab Units 06/07/25  0528   WBC 10*3/mm3 3.97   HEMOGLOBIN g/dL 7.6*   HEMATOCRIT % 22.8*   PLATELETS 10*3/mm3 194             Results from last 7 days   Lab Units 06/09/25  0917   MAGNESIUM mg/dL 1.8                                   I personally viewed and interpreted the patient's EKG/Telemetry data    Assessment/Plan:     1. Postoperative PAF  2. Lung cancer s/p chemotherapy and LL lobectomy  3. HTN  4. CKD3    Her AF was asymptomatic. She certainly has risk factors for AF, including age/HTN/female sex. There is a possibility she has had AF that " she has not previously known about. She is at risk for recurrent AF in the post-operative period. I favor a short course of amiodarone (200mg daily) and OAC (apixaban 5mg BID) as her CHADSVASc = 4. I will place a Zio at discharge. She needs an echo to check LA volume.     If QT is okay in AM and echo is reasonably WNL for age, she will be okay for d/c tomorrow. She wants to follow up with [facility]; her  will call their office at discharge.     Sincerely,    Trevon Miranda MD             Electronically signed by Trevon Miranda MD at 06/09/25 2504

## 2025-06-09 NOTE — CONSULTS
"Date of Hospital Visit: 25  Encounter Provider: Trevon Miranda MD  Place of Service: [facility] CARDIOLOGY  Patient Name: Lolis Griffith  :1945  Referral Provider: Teodora Dozier MD    Chief complaint: lung cancer    History of Present Illness    Mrs Lolis Griffith is a 78yo woman with CKD3, HTN, and COPD who is s/p left lower lobectomy 3 days ago. She is s/p neoadjuvant chemotherapy. She has done beautifully after surgery; all tubes/drains are out. Last night she went into atrial fibrillation with rapid rates. It was asymptomatic.     She is normally very active and denies chest pain, palps, LH, or syncope. She has chronic stable TORRES. She had an outpatient perfusion stress test that I personally reviewed. It was technically difficult but the attenuation corrected images show no evidence of infarct or ischemia. Her LVEF was normal.    Past Medical History:   Diagnosis Date    CKD (chronic kidney disease), stage III     COPD (chronic obstructive pulmonary disease)     PT DENIES THIS    GERD (gastroesophageal reflux disease)     History of chemotherapy     LAST TREATMENT 5-15-25    Hypertension     Lung nodules     Malignant neoplasm of lower lobe of left lung     Other hyperlipidemia     Pancreas disorder     states scanned yearly ot monitor \"fatty pancreas\"    Pneumonia     Port-A-Cath in place     RIGHT CHEST    Seasonal allergies        Past Surgical History:   Procedure Laterality Date    BLADDER REPAIR      BRONCHOSCOPY WITH ION ROBOTIC ASSIST Left 2025    Procedure: BRONCHOSCOPY WITH ION ROBOT: BAL, REBUS, NEEDLE ASPIRATE, BIOPSIES insertion of lighted robot navigated instrument to view inside the lung;  Surgeon: Stevie Carson DO;  Location: [facility] OR;  Service: Robotics - Pulmonary;  Laterality: Left;    EYE SURGERY Bilateral     CATARACTS    LAPAROSCOPIC CHOLECYSTECTOMY      PARATHYROIDECTOMY      RECTAL PROLAPSE REPAIR      TOTAL " ABDOMINAL HYSTERECTOMY WITH SALPINGO OOPHORECTOMY      TOTAL HIP ARTHROPLASTY Left     VENOUS ACCESS DEVICE (PORT) INSERTION N/A 03/04/2025    Procedure: INSERTION VENOUS ACCESS DEVICE: Placement of a port in the tissue under the skin at your upper chest with a tube attached to it that goes into your vein;  Surgeon: Abhinav Martin MD;  Location: [facility] OR [facility] [place];  Service: General;  Laterality: N/A;       Prior to Admission medications    Medication Sig Start Date End Date Taking? Authorizing Provider   cetirizine (zyrTEC) 10 MG tablet Take 1 tablet by mouth Every Night.   Yes Benoit Blackburn MD   Docusate Sodium 100 MG capsule Take 1 tablet by mouth Every Night.   Yes Benoit Blackburn MD   ezetimibe (ZETIA) 10 MG tablet Take 1 tablet by mouth Every Night.   Yes Benoit Blackburn MD   fenofibrate (TRICOR) 145 MG tablet Take 1 tablet by mouth Daily. 1/21/22  Yes Benoit Blackburn MD   ferrous sulfate 324 (65 Fe) MG tablet delayed-release EC tablet Take 1 tablet by mouth Every Night.   Yes Benoit Blackburn MD   lisinopril (PRINIVIL,ZESTRIL) 5 MG tablet Take 1 tablet by mouth Daily. 2/18/25  Yes Teodora Dozier MD   loratadine (CLARITIN) 10 MG tablet Take 1 tablet by mouth Daily. 1/9/24  Yes Benoit Blackburn MD   montelukast (SINGULAIR) 10 MG tablet Take 1 tablet by mouth Every Night.   Yes Benoit Blackubrn MD   omeprazole (priLOSEC) 40 MG capsule Take 1 capsule by mouth Daily As Needed.   Yes Benoit Blackburn MD   ondansetron (ZOFRAN) 8 MG tablet Take 1 tablet by mouth 3 (Three) Times a Day As Needed for Nausea or Vomiting. 3/5/25  Yes Betito Benavidez PA-C   simvastatin (ZOCOR) 20 MG tablet Take 1 tablet by mouth Daily.   Yes Benoit Blackburn MD   Cholecalciferol 125 MCG (5000 UT) tablet Take 1 tablet by mouth Daily.    Benoit Blackburn MD   estradiol (ESTRACE) 0.1 MG/GM vaginal cream 1 g 3 (Three) Times a Week. 3/8/22   Benoit Blackburn MD   folic acid (FOLVITE) 1  "MG tablet Take 1 tablet by mouth Daily. Start at least 7 days prior to chemotherapy until at least 3 weeks after all chemotherapy. 3/5/25   Betito Benavidez PA-C   magnesium gluconate (MAGONATE) 500 MG tablet Take 0.5 tablets by mouth Every Night.    Provider, MD Benoit   Unable to find Take 1 each by mouth Daily. PROBIOTIC PREBIOTICS AND CRANBERRY    Provider, MD Benoit       Social History     Socioeconomic History    Marital status:    Tobacco Use    Smoking status: Never     Passive exposure: Never    Smokeless tobacco: Never   Vaping Use    Vaping status: Never Used   Substance and Sexual Activity    Alcohol use: Not Currently    Drug use: Never    Sexual activity: Defer       Family History   Problem Relation Age of Onset    Cancer Brother     Malig Hyperthermia Neg Hx        Review of Systems   Constitutional:  Positive for fatigue.   Respiratory:  Positive for shortness of breath.         Objective:     Vitals:    06/09/25 0630 06/09/25 0645 06/09/25 0715 06/09/25 1115   BP: 102/63 102/64 99/58 134/61   BP Location:   Right arm Right arm   Patient Position:   Lying Lying   Pulse: 109 113 110 54   Resp:   18 18   Temp:   97.8 °F (36.6 °C) 97.9 °F (36.6 °C)   TempSrc:   Oral Oral   SpO2: 98% 98% 99% 96%   Weight:       Height:         Body mass index is 30.83 kg/m².  Flowsheet Rows      Flowsheet Row First Filed Value   Admission Height 167.6 cm (66\") Documented at 06/06/2025 0955   Admission Weight 86.6 kg (191 lb) Documented at 06/06/2025 1730            Physical Exam  Vitals reviewed.   Constitutional:       Appearance: She is well-developed.   HENT:      Head: Normocephalic.      Nose: Nose normal.   Eyes:      Conjunctiva/sclera: Conjunctivae normal.   Neck:      Vascular: No JVD.   Cardiovascular:      Rate and Rhythm: Normal rate and regular rhythm.      Pulses: Normal pulses.      Heart sounds: Normal heart sounds.   Pulmonary:      Effort: Pulmonary effort is normal.      Breath sounds: " Normal breath sounds.   Abdominal:      Palpations: Abdomen is soft.      Tenderness: There is no abdominal tenderness.   Musculoskeletal:         General: No swelling. Normal range of motion.      Cervical back: Normal range of motion.   Skin:     General: Skin is warm and dry.      Findings: No erythema.   Neurological:      General: No focal deficit present.      Mental Status: She is alert.   Psychiatric:         Mood and Affect: Mood normal.                 Lab Review:                Results from last 7 days   Lab Units 06/09/25  0917   SODIUM mmol/L 144   POTASSIUM mmol/L 3.9   CHLORIDE mmol/L 107   CO2 mmol/L 28.1   BUN mg/dL 18.0   CREATININE mg/dL 1.19*   GLUCOSE mg/dL 85   CALCIUM mg/dL 9.1         Results from last 7 days   Lab Units 06/07/25  0528   WBC 10*3/mm3 3.97   HEMOGLOBIN g/dL 7.6*   HEMATOCRIT % 22.8*   PLATELETS 10*3/mm3 194             Results from last 7 days   Lab Units 06/09/25  0917   MAGNESIUM mg/dL 1.8                                   I personally viewed and interpreted the patient's EKG/Telemetry data    Assessment/Plan:     1. Postoperative PAF  2. Lung cancer s/p chemotherapy and LL lobectomy  3. HTN  4. CKD3    Her AF was asymptomatic. She certainly has risk factors for AF, including age/HTN/female sex. There is a possibility she has had AF that she has not previously known about. She is at risk for recurrent AF in the post-operative period. I favor a short course of amiodarone (200mg daily) and OAC (apixaban 5mg BID) as her CHADSVASc = 4. I will place a Zio at discharge. She needs an echo to check LA volume.     If QT is okay in AM and echo is reasonably WNL for age, she will be okay for d/c tomorrow. She wants to follow up with [facility]; her  will call their office at discharge.     Sincerely,    Trevon Miranda MD

## 2025-06-09 NOTE — PROGRESS NOTES
POST-OPERATIVE NOTE     Chief Complaint: LLL NSCLC  S/P: Robot assisted left lower lobectomy   POD # 3    Subjective  Reports asymptomatic during new onset afib episode overnight.  Denies chest pain, shortness of breath or palpitations.  Currently back in NSR.  Blood pressure remains stable.  Pain well controlled, room air.    Objective:  General appearance: Comfortable, in no acute distress  Vital signs: Blood pressure 99/58, pulse 57, temperature 97.8 °F temperature source Oral, resp. rate 18, SpO2 99% room air.    Lungs: Normal effort and normal respiratory rate.  Heart: Normal rate.  Regular rhythm.    Chest: Symmetric chest wall expansion. Duoderm covering old chest tube site c/d.  Abdomen: Abdomen is soft, non-distended   Neurological: Patient is alert and oriented to person, place and time.    Skin:  Warm and dry.     Results Review:    I have reviewed the patient's most recent labs and imaging.    Assessment & Plan   POD 3 s/p left lower lobectomy, doing well overall with exception of new onset afib with rapid rate overnight.  She was started on amiodarone gtt and cardiology consult is pending.  She is back in NSR on exam.      Pain tolerable, continue current regimen.  Continue scheduled tylenol, gabapentin.  PRN oxycodone, dilaudid IV for severe pain.  Doing well with pulmonary hygiene measures, I.S.  Ambulating around nursing station 3x daily with assistance, up to chair.  Continues to tolerate on room air.      CXR this am is stable s/p chest tube removal yesterday.  Creatinine uptrended slightly today but stable, will plan for repeat labs AM.    Appreciate cardiology recommendations, will plan to keep patient again overnight to ensure heart rate is adequately controlled prior to discharge home, possibly tomorrow pending course.  Ok from our standpoint for therapeutic anticoagulation if recommended by cards.      Discussed with Dr Dozier, patient and family at bedside, RN    Marielena Young DNP,  APRN  [facility]  06/09/25  11:24 EDT    Patient was seen and assessed while wearing personal protective equipment including facemask, protective eyewear and gloves.  Hand hygiene performed prior to entering the room and upon exiting with doffing of gloves.

## 2025-06-10 ENCOUNTER — APPOINTMENT (OUTPATIENT)
Dept: GENERAL RADIOLOGY | Facility: HOSPITAL | Age: 80
DRG: 164 | End: 2025-06-10
Payer: MEDICARE

## 2025-06-10 ENCOUNTER — READMISSION MANAGEMENT (OUTPATIENT)
Dept: CALL CENTER | Facility: HOSPITAL | Age: 80
End: 2025-06-10
Payer: MEDICARE

## 2025-06-10 ENCOUNTER — APPOINTMENT (OUTPATIENT)
Dept: CARDIOLOGY | Facility: HOSPITAL | Age: 80
DRG: 164 | End: 2025-06-10
Payer: MEDICARE

## 2025-06-10 VITALS
DIASTOLIC BLOOD PRESSURE: 78 MMHG | RESPIRATION RATE: 18 BRPM | SYSTOLIC BLOOD PRESSURE: 155 MMHG | TEMPERATURE: 97.6 F | HEIGHT: 66 IN | BODY MASS INDEX: 30.7 KG/M2 | WEIGHT: 191 LBS | OXYGEN SATURATION: 96 % | HEART RATE: 67 BPM

## 2025-06-10 LAB
ANION GAP SERPL CALCULATED.3IONS-SCNC: 10.8 MMOL/L (ref 5–15)
BASOPHILS # BLD AUTO: 0.02 10*3/MM3 (ref 0–0.2)
BASOPHILS NFR BLD AUTO: 0.6 % (ref 0–1.5)
BUN SERPL-MCNC: 19 MG/DL (ref 8–23)
BUN/CREAT SERPL: 17 (ref 7–25)
CALCIUM SPEC-SCNC: 9.3 MG/DL (ref 8.6–10.5)
CHLORIDE SERPL-SCNC: 105 MMOL/L (ref 98–107)
CO2 SERPL-SCNC: 25.2 MMOL/L (ref 22–29)
CREAT SERPL-MCNC: 1.12 MG/DL (ref 0.57–1)
DEPRECATED RDW RBC AUTO: 64.1 FL (ref 37–54)
EGFRCR SERPLBLD CKD-EPI 2021: 50.8 ML/MIN/1.73
EOSINOPHIL # BLD AUTO: 0.19 10*3/MM3 (ref 0–0.4)
EOSINOPHIL NFR BLD AUTO: 5.3 % (ref 0.3–6.2)
ERYTHROCYTE [DISTWIDTH] IN BLOOD BY AUTOMATED COUNT: 18 % (ref 12.3–15.4)
GLUCOSE SERPL-MCNC: 85 MG/DL (ref 65–99)
HCT VFR BLD AUTO: 25.3 % (ref 34–46.6)
HGB BLD-MCNC: 8.5 G/DL (ref 12–15.9)
IMM GRANULOCYTES # BLD AUTO: 0.04 10*3/MM3 (ref 0–0.05)
IMM GRANULOCYTES NFR BLD AUTO: 1.1 % (ref 0–0.5)
LYMPHOCYTES # BLD AUTO: 0.95 10*3/MM3 (ref 0.7–3.1)
LYMPHOCYTES NFR BLD AUTO: 26.5 % (ref 19.6–45.3)
MAGNESIUM SERPL-MCNC: 1.8 MG/DL (ref 1.6–2.4)
MCH RBC QN AUTO: 33.3 PG (ref 26.6–33)
MCHC RBC AUTO-ENTMCNC: 33.6 G/DL (ref 31.5–35.7)
MCV RBC AUTO: 99.2 FL (ref 79–97)
MONOCYTES # BLD AUTO: 0.51 10*3/MM3 (ref 0.1–0.9)
MONOCYTES NFR BLD AUTO: 14.2 % (ref 5–12)
NEUTROPHILS NFR BLD AUTO: 1.87 10*3/MM3 (ref 1.7–7)
NEUTROPHILS NFR BLD AUTO: 52.3 % (ref 42.7–76)
NRBC BLD AUTO-RTO: 0 /100 WBC (ref 0–0.2)
PLATELET # BLD AUTO: 246 10*3/MM3 (ref 140–450)
PMV BLD AUTO: 8.7 FL (ref 6–12)
POTASSIUM SERPL-SCNC: 3.9 MMOL/L (ref 3.5–5.2)
QT INTERVAL: 439 MS
QTC INTERVAL: 443 MS
RBC # BLD AUTO: 2.55 10*6/MM3 (ref 3.77–5.28)
SODIUM SERPL-SCNC: 141 MMOL/L (ref 136–145)
WBC NRBC COR # BLD AUTO: 3.58 10*3/MM3 (ref 3.4–10.8)

## 2025-06-10 PROCEDURE — 99232 SBSQ HOSP IP/OBS MODERATE 35: CPT | Performed by: NURSE PRACTITIONER

## 2025-06-10 PROCEDURE — 99024 POSTOP FOLLOW-UP VISIT: CPT

## 2025-06-10 PROCEDURE — 83735 ASSAY OF MAGNESIUM: CPT

## 2025-06-10 PROCEDURE — 93010 ELECTROCARDIOGRAM REPORT: CPT | Performed by: INTERNAL MEDICINE

## 2025-06-10 PROCEDURE — 80048 BASIC METABOLIC PNL TOTAL CA: CPT

## 2025-06-10 PROCEDURE — 85025 COMPLETE CBC W/AUTO DIFF WBC: CPT

## 2025-06-10 PROCEDURE — 93005 ELECTROCARDIOGRAM TRACING: CPT | Performed by: INTERNAL MEDICINE

## 2025-06-10 PROCEDURE — 93246 EXT ECG>7D<15D RECORDING: CPT

## 2025-06-10 PROCEDURE — 71045 X-RAY EXAM CHEST 1 VIEW: CPT

## 2025-06-10 RX ORDER — AMIODARONE HYDROCHLORIDE 200 MG/1
200 TABLET ORAL
Qty: 30 TABLET | Refills: 0 | Status: SHIPPED | OUTPATIENT
Start: 2025-06-11 | End: 2025-06-19 | Stop reason: SDUPTHER

## 2025-06-10 RX ADMIN — FOLIC ACID 1 MG: 1 TABLET ORAL at 09:08

## 2025-06-10 RX ADMIN — ACETAMINOPHEN 1000 MG: 500 TABLET, FILM COATED ORAL at 09:08

## 2025-06-10 RX ADMIN — GABAPENTIN 200 MG: 100 CAPSULE ORAL at 09:08

## 2025-06-10 RX ADMIN — PANTOPRAZOLE SODIUM 40 MG: 40 TABLET, DELAYED RELEASE ORAL at 06:43

## 2025-06-10 RX ADMIN — FENOFIBRATE 145 MG: 145 TABLET ORAL at 09:09

## 2025-06-10 RX ADMIN — AMIODARONE HYDROCHLORIDE 200 MG: 200 TABLET ORAL at 09:08

## 2025-06-10 RX ADMIN — ATORVASTATIN CALCIUM 10 MG: 20 TABLET, FILM COATED ORAL at 09:08

## 2025-06-10 RX ADMIN — CETIRIZINE HYDROCHLORIDE 10 MG: 10 TABLET ORAL at 09:08

## 2025-06-10 RX ADMIN — DOCUSATE SODIUM 100 MG: 100 CAPSULE, LIQUID FILLED ORAL at 09:08

## 2025-06-10 RX ADMIN — APIXABAN 5 MG: 5 TABLET, FILM COATED ORAL at 09:08

## 2025-06-10 NOTE — PLAN OF CARE
Goal Outcome Evaluation:  Plan of Care Reviewed With: patient        Progress: improving  Outcome Evaluation: VSS, heart rate normal. Chest tube exit site CDI. Plan to Dc home.

## 2025-06-10 NOTE — PLAN OF CARE
Problem: Adult Inpatient Plan of Care  Goal: Plan of Care Review  Flowsheets (Taken 6/10/2025 3377)  Progress: improving  Outcome Evaluation: heart rate regular no afib noted dressing dry and intact to left flank oxygen level upper 90's on room air rested well not complaints of pain  Plan of Care Reviewed With: patient   Goal Outcome Evaluation:  Plan of Care Reviewed With: patient        Progress: improving  Outcome Evaluation: heart rate regular no afib noted dressing dry and intact to left flank oxygen level upper 90's on room air rested well not complaints of pain

## 2025-06-10 NOTE — DISCHARGE SUMMARY
Patient Care Team:  Mary Cruz APRN as PCP - General (Nurse Practitioner)  Polina Post APRN as Nurse Practitioner (Pulmonary Disease)    Date of Admission: 6/6/2025   Date of Discharge:  6/10/2025    Discharge Diagnosis: NSCLC left lower lobe     Presenting Problem  NSCLC of lower lobe [C34.30]  Malignant neoplasm of upper lobe, left bronchus or lung [C34.12]  Adenocarcinoma of lung, left [C34.92]     History of Present Illness  Lolis Griffith is a 79 y.o. female who presented with a hypermetabolic left lower lobe lung nodule on PET CT scan performed on 5/28/25.  She had no evidence of distant metastatic disease.  Surgical resection was recommended and on 6/6/24 she underwent left thoracoscopy with lobectomy with Davinci robot, mediastinal and pilar lymph node dissection, intercostal nerve blocks and bronchoscopy with Dr Dozier for curative intent.  After initial recovery in PACU she was admitted to  where she had a stable post-operative course.  Her chest tube was discontinued on 6/8/25 on POD 2.  In the early morning of 6/9/25 she had a rhythm change while sleeping and was noted to be in atrial fibrillation with a rapid ventricular rate.  She was asymptomatic.  Cardiology was consulted who started continuous Amiodarone drip and she subsequently converted back to NSR shortly thereafter.  EKG and transthoracic echocardiogram were completed per cardiology orders.  She was started on Eliquis on 6/9/25 and transitioned from IV amiodarone to PO amiodarone.  Zio patch was recommended and ordered to be applied prior to discharge.  Her pain has been well controlled and she has been ambulating without assistance.  She did not require any supplemental oxygen while inpatient.  Imaging and lab work have remained stable as well and patient was deemed stable for discharge home with spouse.  She is scheduled for a post-operative follow-up in our office on 6/19/25 at 2:45 pm with a repeat chest xray.  She  will establish care with [facility] cardiology in 3-4 weeks.  1 month supply of Amiodarone and Eliquis as well as medications for ongoing pain management were sent to her pharmacy of choice at discharge.      Patient did undergo previous neoadjuvant treatment and is already established with Dr Reinaldo Banks.  Her final pathology resulted as [id number].      Procedures Performed  Procedure(s):  THORACOSCOPY WITH LOBECTOMY WITH DAVINCI ROBOT, mediastinal and pilar lymph node dissection, intercostal nerve blocks, bronchoscopy       Consults:   Consults       Date and Time Order Name Status Description    6/9/2025  4:51 AM Inpatient Cardiology Consult Completed             Pertinent Test Results:     Imaging Results (Last 24 Hours)       Procedure Component Value Units Date/Time    XR Chest 1 View [[id number]] Collected: 06/10/25 0715     Updated: 06/10/25 0720    Narrative:      XR CHEST 1 VW-     DATE OF EXAM: 6/10/2025 5:18 AM     INDICATION: Postop lung surgery.     COMPARISON: Radiograph 6/9/2025, 6/8/2025, and 6/7/2025. PET/CT  5/28/2025.     TECHNIQUE: A single portable AP view of the chest was obtained.     FINDINGS:  Lordotic positioning and patient rotation. Overlying artifacts. Stable  right IJ central venous port catheter. Low lung volumes with  similar-appearing bilateral perihilar and bibasilar opacities. Stable  small left pneumothorax. Unchanged cardiac and mediastinal contours. No  acute osseous abnormality is identified. Cholecystectomy clips.       Impression:         1. Stable small left apical pneumothorax.  2. Low lung volumes with similar-appearing bilateral perihilar and  bibasilar opacities.     This report was finalized on 6/10/2025 7:17 AM by Christiano Meek MD on  Workstation: [id number]               Lab Results (last 24 hours)       Procedure Component Value Units Date/Time    Basic Metabolic Panel [[id number]]  (Abnormal) Collected: 06/10/25 0615    Specimen: Blood Updated:  06/10/25 0757     Glucose 85 mg/dL      BUN 19.0 mg/dL      Creatinine 1.12 mg/dL      Sodium 141 mmol/L      Potassium 3.9 mmol/L      Chloride 105 mmol/L      CO2 25.2 mmol/L      Calcium 9.3 mg/dL      BUN/Creatinine Ratio 17.0     Anion Gap 10.8 mmol/L      eGFR 50.8 mL/min/1.73     Narrative:      GFR Categories in Chronic Kidney Disease (CKD)              GFR Category          GFR (mL/min/1.73)    Interpretation  G1                    90 or greater        Normal or high (1)  G2                    60-89                Mild decrease (1)  G3a                   45-59                Mild to moderate decrease  G3b                   30-44                Moderate to severe decrease  G4                    15-29                Severe decrease  G5                    14 or less           Kidney failure    (1)In the absence of evidence of kidney disease, neither GFR category G1 or G2 fulfill the criteria for CKD.    eGFR calculation 2021 CKD-EPI creatinine equation, which does not include race as a factor    Magnesium [[id number]]  (Normal) Collected: 06/10/25 0615    Specimen: Blood Updated: 06/10/25 0757     Magnesium 1.8 mg/dL     CBC & Differential [[id number]]  (Abnormal) Collected: 06/10/25 0615    Specimen: Blood Updated: 06/10/25 0733    Narrative:      The following orders were created for panel order CBC & Differential.  Procedure                               Abnormality         Status                     ---------                               -----------         ------                     CBC Auto Differential[[id number]]        Abnormal            Final result                 Please view results for these tests on the individual orders.    CBC Auto Differential [[id number]]  (Abnormal) Collected: 06/10/25 0615    Specimen: Blood Updated: 06/10/25 0733     WBC 3.58 10*3/mm3      RBC 2.55 10*6/mm3      Hemoglobin 8.5 g/dL      Hematocrit 25.3 %      MCV 99.2 fL      MCH 33.3 pg      MCHC 33.6 g/dL      RDW 18.0 %       RDW-SD 64.1 fl      MPV 8.7 fL      Platelets 246 10*3/mm3      Neutrophil % 52.3 %      Lymphocyte % 26.5 %      Monocyte % 14.2 %      Eosinophil % 5.3 %      Basophil % 0.6 %      Immature Grans % 1.1 %      Neutrophils, Absolute 1.87 10*3/mm3      Lymphocytes, Absolute 0.95 10*3/mm3      Monocytes, Absolute 0.51 10*3/mm3      Eosinophils, Absolute 0.19 10*3/mm3      Basophils, Absolute 0.02 10*3/mm3      Immature Grans, Absolute 0.04 10*3/mm3      nRBC 0.0 /100 WBC               Condition on Discharge:  Stable    Vital Signs  Temp:  [97.5 °F (36.4 °C)-98.1 °F (36.7 °C)] 97.6 °F (36.4 °C)  Heart Rate:  [61-76] 67  Resp:  [18] 18  BP: (118-155)/(55-78) 155/78    Physical Exam:    General Appearance:  Alert, cooperative, in no acute distress   Head:  Normocephalic, without obvious abnormality, atraumatic   Eyes:  Lids and lashes normal, conjunctivae and sclerae normal, no icterus, no pallor, corneas clear, PERRLA   Ears:  Ears appear intact with no abnormalities noted   Throat:  No oral lesions, no thrush, oral mucosa moist   Neck:  No adenopathy, supple, trachea midline, no thyromegaly, no carotid bruit, no JVD   Back:  No kyphosis present, no scoliosis present, no skin lesions, erythema or scars, no tenderness to percussion, or palpation, range of motion normal   Lungs:  Clear to auscultation,respirations regular, even and unlabored    Heart:  Regular rhythm and normal rate, normal S1 and S2, no murmur, no gallop, no rub, no click   Breast Exam:  Deferred   Abdomen:  Normal bowel sounds, no masses, no organomegaly, soft non-tender, non-distended, no guarding, no rebound tenderness   Genitalia:  Deferred   Extremities:  Moves all extremities well, no edema, no cyanosis, no redness   Pulses:  Pulses palpable and equal bilaterally   Skin:  No bleeding, bruising or rash   Lymph nodes:  No palpable adenopathy   Neurologic:  Cranial nerves 2 - 12 grossly intact, sensation intact, DTR present and equal bilaterally        Discharge Disposition  Home today    Discharge Medications     Discharge Medications        New Medications        Instructions Start Date   acetaminophen 500 MG tablet  Commonly known as: TYLENOL   1,000 mg, Oral, 3 times daily      amiodarone 200 MG tablet  Commonly known as: PACERONE   200 mg, Oral, Every 24 Hours Scheduled   Start Date: June 11, 2025     apixaban 5 MG tablet tablet  Commonly known as: ELIQUIS   5 mg, Oral, Every 12 Hours Scheduled      gabapentin 100 MG capsule  Commonly known as: NEURONTIN   200 mg, Oral, Every 12 Hours Scheduled      oxyCODONE 5 MG immediate release tablet  Commonly known as: ROXICODONE   5 mg, Oral, Every 4 Hours PRN             Continue These Medications        Instructions Start Date   cetirizine 10 MG tablet  Commonly known as: zyrTEC   1 tablet, Nightly      Cholecalciferol 125 MCG (5000 UT) tablet   1 tablet, Daily      Docusate Sodium 100 MG capsule   100 mg, Nightly      estradiol 0.1 MG/GM vaginal cream  Commonly known as: ESTRACE   1 g 3 (Three) Times a Week.      ezetimibe 10 MG tablet  Commonly known as: ZETIA   10 mg, Nightly      fenofibrate 145 MG tablet  Commonly known as: TRICOR   145 mg, Daily      ferrous sulfate 324 (65 Fe) MG tablet delayed-release EC tablet   324 mg, Nightly      folic acid 1 MG tablet  Commonly known as: FOLVITE   1 mg, Oral, Daily, Start at least 7 days prior to chemotherapy until at least 3 weeks after all chemotherapy.      lisinopril 5 MG tablet  Commonly known as: PRINIVIL,ZESTRIL   5 mg, Oral, Daily      loratadine 10 MG tablet  Commonly known as: CLARITIN   Take 1 tablet by mouth Daily.      magnesium gluconate 500 MG tablet  Commonly known as: MAGONATE   250 mg, Nightly      montelukast 10 MG tablet  Commonly known as: SINGULAIR   10 mg, Nightly      omeprazole 40 MG capsule  Commonly known as: priLOSEC   40 mg, Daily PRN      ondansetron 8 MG tablet  Commonly known as: ZOFRAN   8 mg, Oral, 3 Times Daily PRN       simvastatin 20 MG tablet  Commonly known as: ZOCOR   20 mg, Daily      Unable to find   1 each, Daily               Discharge Instructions:  No heavy lifting, pushing, pulling greater than 10 pounds.  No driving up until 2 weeks after surgery and no longer taking narcotics.  Resume home diet as tolerated.  Continue incentive spirometer at least 4 times per day.  Remove dressing from post chest tube site after 48 hours, may shower and clean surgical sites with antibacterial soap or hydrogen peroxide, and apply gauze dressing or band-aid as needed for any drainage.  No dressing needed once no longer draining.          Follow-up Appointments  Future Appointments   Date Time Provider Department Center   6/19/2025  2:45 PM Naomi Whitlock DNP, APRN [facility]   7/8/2025  3:30 PM Reinaldo Banks MD [facility]   11/7/2025 10:30 AM Polina Post APRN [facility]     Additional Instructions for the Follow-ups that You Need to Schedule       XR Chest 2 View    Jun 20, 2025 (Approximate)      To be performed day of follow-up appointment with [facility] thoracic surgery    Exam reason: Post-op lung surgery   Release to patient: Routine Release                    For any questions regarding patient's stay, please refer to patient's chart.    Marielena Young DNP, APRN  [facility]  06/10/25  12:53 EDT    I have spent greater than 75 minutes reviewing the patient's chart including medical history, notes, radiographic images, labs, and performing assessment and development of a plan and discussion with the patient/family at bedside.

## 2025-06-10 NOTE — PAYOR COMM NOTE
"Pau Ortiz (79 y.o. Female)    PLEASE SEE ATTACHED FOR DC NOTICE   REQ FOR ADD DAYS THROUGH 6/10  UD FAXED ON 6/9      REF#[id number]   THANK YOU  SHE LORENZO RN/UM DEPT   [facility]  PH: [phone]  FAX:  [phone]     Date of Birth   1945    Social Security Number       [place]   [address] [place] [address]    Home Phone   [phone]    MRN   [id number]       Protestant   Latter-day    Marital Status                               Admission Date   6/6/2025    Admission Type   Elective    Admitting Provider   Teodora Dozier MD    Attending Provider       Department, Room/Bed   [address], E564/1       Discharge Date   6/10/2025    Discharge Disposition   Home or Self Care    Discharge Destination                                 Attending Provider: (none)   Allergies: No Known Allergies    Isolation: None   Infection: None   Code Status: CPR    Ht: 167.6 cm (66\")   Wt: 86.6 kg (191 lb)    Admission Cmt: None   Principal Problem: None                  Active Insurance as of 6/6/2025       Primary Coverage       Payor Plan Insurance Group Employer/Plan Group    AETNA MEDICARE REPLACEMENT AETNA MEDICARE ADVANTAGE PPO [id number]       Payor Plan Address Payor Plan Phone Number Payor Plan Fax Number Effective Dates    [address] [phone]  1/1/2024 - None Entered    [facility] [id number]         Subscriber Name Subscriber Birth Date Member ID       PAU ORTIZ 1945 [id number]               Secondary Coverage       Payor Plan Insurance Group Employer/Plan Group     FOR LIFE  FOR LIFE MC SUP         Payor Plan Address Payor Plan Phone Number Payor Plan Fax Number Effective Dates    [address] [phone]  10/1/2021 - None Entered    [facility] [address]         Subscriber Name Subscriber Birth Date Member ID       PAU ORTIZ 1945 [id number]                     Emergency Contacts       Contact " Person (Rel.) Home Phone Work Phone Mobile Phone    PERFECTO ORTIZ (Spouse) -- -- [phone]              [place]: [facility] [place] [id number]  Tax ID [id number]     Discharge Summary        Marielena Young DNP, APRN at 06/10/25 1249               Patient Care Team:  Mary Cruz APRN as PCP - General (Nurse Practitioner)  Polina Post APRN as Nurse Practitioner (Pulmonary Disease)    Date of Admission: 6/6/2025   Date of Discharge:  6/10/2025    Discharge Diagnosis: NSCLC left lower lobe     Presenting Problem  NSCLC of lower lobe [C34.30]  Malignant neoplasm of upper lobe, left bronchus or lung [C34.12]  Adenocarcinoma of lung, left [C34.92]     History of Present Illness  Lolis Ortiz is a 79 y.o. female who presented with a hypermetabolic left lower lobe lung nodule on PET CT scan performed on 5/28/25.  She had no evidence of distant metastatic disease.  Surgical resection was recommended and on 6/6/24 she underwent left thoracoscopy with lobectomy with Davinci robot, mediastinal and pilar lymph node dissection, intercostal nerve blocks and bronchoscopy with Dr Dozier for curative intent.  After initial recovery in PACU she was admitted to  where she had a stable post-operative course.  Her chest tube was discontinued on 6/8/25 on POD 2.  In the early morning of 6/9/25 she had a rhythm change while sleeping and was noted to be in atrial fibrillation with a rapid ventricular rate.  She was asymptomatic.  Cardiology was consulted who started continuous Amiodarone drip and she subsequently converted back to NSR shortly thereafter.  EKG and transthoracic echocardiogram were completed per cardiology orders.  She was started on Eliquis on 6/9/25 and transitioned from IV amiodarone to PO amiodarone.  Zio patch was recommended and ordered to be applied prior to discharge.  Her pain has been well controlled and she has been ambulating without assistance.  She did not require any supplemental oxygen while  inpatient.  Imaging and lab work have remained stable as well and patient was deemed stable for discharge home with spouse.  She is scheduled for a post-operative follow-up in our office on 6/19/25 at 2:45 pm with a repeat chest xray.  She will establish care with [facility] cardiology in 3-4 weeks.  1 month supply of Amiodarone and Eliquis as well as medications for ongoing pain management were sent to her pharmacy of choice at discharge.      Patient did undergo previous neoadjuvant treatment and is already established with Dr Reinaldo Banks.  Her final pathology resulted as [id number].      Procedures Performed  Procedure(s):  THORACOSCOPY WITH LOBECTOMY WITH DAVINCI ROBOT, mediastinal and pilar lymph node dissection, intercostal nerve blocks, bronchoscopy       Consults:   Consults       Date and Time Order Name Status Description    6/9/2025  4:51 AM Inpatient Cardiology Consult Completed             Pertinent Test Results:     Imaging Results (Last 24 Hours)       Procedure Component Value Units Date/Time    XR Chest 1 View [[id number]] Collected: 06/10/25 0715     Updated: 06/10/25 0720    Narrative:      XR CHEST 1 VW-     DATE OF EXAM: 6/10/2025 5:18 AM     INDICATION: Postop lung surgery.     COMPARISON: Radiograph 6/9/2025, 6/8/2025, and 6/7/2025. PET/CT  5/28/2025.     TECHNIQUE: A single portable AP view of the chest was obtained.     FINDINGS:  Lordotic positioning and patient rotation. Overlying artifacts. Stable  right IJ central venous port catheter. Low lung volumes with  similar-appearing bilateral perihilar and bibasilar opacities. Stable  small left pneumothorax. Unchanged cardiac and mediastinal contours. No  acute osseous abnormality is identified. Cholecystectomy clips.       Impression:         1. Stable small left apical pneumothorax.  2. Low lung volumes with similar-appearing bilateral perihilar and  bibasilar opacities.     This report was finalized on 6/10/2025 7:17 AM by  Christiano Meek MD on  Workstation: [id number]               Lab Results (last 24 hours)       Procedure Component Value Units Date/Time    Basic Metabolic Panel [[id number]]  (Abnormal) Collected: 06/10/25 0615    Specimen: Blood Updated: 06/10/25 0757     Glucose 85 mg/dL      BUN 19.0 mg/dL      Creatinine 1.12 mg/dL      Sodium 141 mmol/L      Potassium 3.9 mmol/L      Chloride 105 mmol/L      CO2 25.2 mmol/L      Calcium 9.3 mg/dL      BUN/Creatinine Ratio 17.0     Anion Gap 10.8 mmol/L      eGFR 50.8 mL/min/1.73     Narrative:      GFR Categories in Chronic Kidney Disease (CKD)              GFR Category          GFR (mL/min/1.73)    Interpretation  G1                    90 or greater        Normal or high (1)  G2                    60-89                Mild decrease (1)  G3a                   45-59                Mild to moderate decrease  G3b                   30-44                Moderate to severe decrease  G4                    15-29                Severe decrease  G5                    14 or less           Kidney failure    (1)In the absence of evidence of kidney disease, neither GFR category G1 or G2 fulfill the criteria for CKD.    eGFR calculation 2021 CKD-EPI creatinine equation, which does not include race as a factor    Magnesium [[id number]]  (Normal) Collected: 06/10/25 0615    Specimen: Blood Updated: 06/10/25 0757     Magnesium 1.8 mg/dL     CBC & Differential [[id number]]  (Abnormal) Collected: 06/10/25 0615    Specimen: Blood Updated: 06/10/25 0733    Narrative:      The following orders were created for panel order CBC & Differential.  Procedure                               Abnormality         Status                     ---------                               -----------         ------                     CBC Auto Differential[[id number]]        Abnormal            Final result                 Please view results for these tests on the individual orders.    CBC Auto Differential [[id number]]   (Abnormal) Collected: 06/10/25 0615    Specimen: Blood Updated: 06/10/25 0733     WBC 3.58 10*3/mm3      RBC 2.55 10*6/mm3      Hemoglobin 8.5 g/dL      Hematocrit 25.3 %      MCV 99.2 fL      MCH 33.3 pg      MCHC 33.6 g/dL      RDW 18.0 %      RDW-SD 64.1 fl      MPV 8.7 fL      Platelets 246 10*3/mm3      Neutrophil % 52.3 %      Lymphocyte % 26.5 %      Monocyte % 14.2 %      Eosinophil % 5.3 %      Basophil % 0.6 %      Immature Grans % 1.1 %      Neutrophils, Absolute 1.87 10*3/mm3      Lymphocytes, Absolute 0.95 10*3/mm3      Monocytes, Absolute 0.51 10*3/mm3      Eosinophils, Absolute 0.19 10*3/mm3      Basophils, Absolute 0.02 10*3/mm3      Immature Grans, Absolute 0.04 10*3/mm3      nRBC 0.0 /100 WBC               Condition on Discharge:  Stable    Vital Signs  Temp:  [97.5 °F (36.4 °C)-98.1 °F (36.7 °C)] 97.6 °F (36.4 °C)  Heart Rate:  [61-76] 67  Resp:  [18] 18  BP: (118-155)/(55-78) 155/78    Physical Exam:    General Appearance:  Alert, cooperative, in no acute distress   Head:  Normocephalic, without obvious abnormality, atraumatic   Eyes:  Lids and lashes normal, conjunctivae and sclerae normal, no icterus, no pallor, corneas clear, PERRLA   Ears:  Ears appear intact with no abnormalities noted   Throat:  No oral lesions, no thrush, oral mucosa moist   Neck:  No adenopathy, supple, trachea midline, no thyromegaly, no carotid bruit, no JVD   Back:  No kyphosis present, no scoliosis present, no skin lesions, erythema or scars, no tenderness to percussion, or palpation, range of motion normal   Lungs:  Clear to auscultation,respirations regular, even and unlabored    Heart:  Regular rhythm and normal rate, normal S1 and S2, no murmur, no gallop, no rub, no click   Breast Exam:  Deferred   Abdomen:  Normal bowel sounds, no masses, no organomegaly, soft non-tender, non-distended, no guarding, no rebound tenderness   Genitalia:  Deferred   Extremities:  Moves all extremities well, no edema, no cyanosis, no  redness   Pulses:  Pulses palpable and equal bilaterally   Skin:  No bleeding, bruising or rash   Lymph nodes:  No palpable adenopathy   Neurologic:  Cranial nerves 2 - 12 grossly intact, sensation intact, DTR present and equal bilaterally       Discharge Disposition  Home today    Discharge Medications     Discharge Medications        New Medications        Instructions Start Date   acetaminophen 500 MG tablet  Commonly known as: TYLENOL   1,000 mg, Oral, 3 times daily      amiodarone 200 MG tablet  Commonly known as: PACERONE   200 mg, Oral, Every 24 Hours Scheduled   Start Date: June 11, 2025     apixaban 5 MG tablet tablet  Commonly known as: ELIQUIS   5 mg, Oral, Every 12 Hours Scheduled      gabapentin 100 MG capsule  Commonly known as: NEURONTIN   200 mg, Oral, Every 12 Hours Scheduled      oxyCODONE 5 MG immediate release tablet  Commonly known as: ROXICODONE   5 mg, Oral, Every 4 Hours PRN             Continue These Medications        Instructions Start Date   cetirizine 10 MG tablet  Commonly known as: zyrTEC   1 tablet, Nightly      Cholecalciferol 125 MCG (5000 UT) tablet   1 tablet, Daily      Docusate Sodium 100 MG capsule   100 mg, Nightly      estradiol 0.1 MG/GM vaginal cream  Commonly known as: ESTRACE   1 g 3 (Three) Times a Week.      ezetimibe 10 MG tablet  Commonly known as: ZETIA   10 mg, Nightly      fenofibrate 145 MG tablet  Commonly known as: TRICOR   145 mg, Daily      ferrous sulfate 324 (65 Fe) MG tablet delayed-release EC tablet   324 mg, Nightly      folic acid 1 MG tablet  Commonly known as: FOLVITE   1 mg, Oral, Daily, Start at least 7 days prior to chemotherapy until at least 3 weeks after all chemotherapy.      lisinopril 5 MG tablet  Commonly known as: PRINIVIL,ZESTRIL   5 mg, Oral, Daily      loratadine 10 MG tablet  Commonly known as: CLARITIN   Take 1 tablet by mouth Daily.      magnesium gluconate 500 MG tablet  Commonly known as: MAGONATE   250 mg, Nightly      montelukast  10 MG tablet  Commonly known as: SINGULAIR   10 mg, Nightly      omeprazole 40 MG capsule  Commonly known as: priLOSEC   40 mg, Daily PRN      ondansetron 8 MG tablet  Commonly known as: ZOFRAN   8 mg, Oral, 3 Times Daily PRN      simvastatin 20 MG tablet  Commonly known as: ZOCOR   20 mg, Daily      Unable to find   1 each, Daily               Discharge Instructions:  No heavy lifting, pushing, pulling greater than 10 pounds.  No driving up until 2 weeks after surgery and no longer taking narcotics.  Resume home diet as tolerated.  Continue incentive spirometer at least 4 times per day.  Remove dressing from post chest tube site after 48 hours, may shower and clean surgical sites with antibacterial soap or hydrogen peroxide, and apply gauze dressing or band-aid as needed for any drainage.  No dressing needed once no longer draining.          Follow-up Appointments  Future Appointments   Date Time Provider Department Center   6/19/2025  2:45 PM Naomi Whitlock DNP, APRN [facility]   7/8/2025  3:30 PM Reinaldo Banks MD [facility]   11/7/2025 10:30 AM Polina Post APRN [facility]     Additional Instructions for the Follow-ups that You Need to Schedule       XR Chest 2 View    Jun 20, 2025 (Approximate)      To be performed day of follow-up appointment with [facility] thoracic surgery    Exam reason: Post-op lung surgery   Release to patient: Routine Release                    For any questions regarding patient's stay, please refer to patient's chart.    Marielena Young DNP, APRN  [facility]  06/10/25  12:53 EDT    I have spent greater than 75 minutes reviewing the patient's chart including medical history, notes, radiographic images, labs, and performing assessment and development of a plan and discussion with the patient/family at bedside.              Electronically signed by Marielena Young DNP, APRN at 06/10/25 2099

## 2025-06-10 NOTE — CASE MANAGEMENT/SOCIAL WORK
Case Management Discharge Note      Final Note: Discharged home         Selected Continued Care - Discharged on 6/10/2025 Admission date: 6/6/2025 - Discharge disposition: Home or Self Care      Destination    No services have been selected for the patient.                Durable Medical Equipment    No services have been selected for the patient.                Dialysis/Infusion    No services have been selected for the patient.                Home Medical Care    No services have been selected for the patient.                Therapy    No services have been selected for the patient.                Community Resources    No services have been selected for the patient.                Community & DME    No services have been selected for the patient.                    Transportation Services  Private: Car    Final Discharge Disposition Code: 01 - home or self-care

## 2025-06-10 NOTE — PROGRESS NOTES
"    Patient Name: Lolis Griffith  :1945  79 y.o.      Patient Care Team:  Mary Cruz APRN as PCP - General (Nurse Practitioner)  Polina Post APRN as Nurse Practitioner (Pulmonary Disease)    Chief Complaint: follow up post op AF    Interval History:    Remains in SR.     Echo      Left ventricular systolic function is normal. Calculated left ventricular EF = 60.6%    Left ventricular diastolic function is consistent with (grade II w/high LAP) pseudonormalization.    Aortic valve sclerosis without hemodynamically significant stenosis.    Mild-moderate mitral regurgitation.    Estimated right ventricular systolic pressure from tricuspid regurgitation is normal (<35 mmHg).    The left atrial cavity is mildly dilated.     No prior study available for comparison.       Objective   Vital Signs  Temp:  [97.5 °F (36.4 °C)-98.1 °F (36.7 °C)] 98 °F (36.7 °C)  Heart Rate:  [61-76] 71  Resp:  [18] 18  BP: (118-147)/(55-75) 147/66    Intake/Output Summary (Last 24 hours) at 6/10/2025 1204  Last data filed at 6/10/2025 0620  Gross per 24 hour   Intake 300 ml   Output --   Net 300 ml     Flowsheet Rows      Flowsheet Row First Filed Value   Admission Height 167.6 cm (66\") Documented at 2025 0955   Admission Weight 86.6 kg (191 lb) Documented at 2025 1730            Physical Exam:   General Appearance:    Alert, cooperative, in no acute distress   Lungs:     Clear to auscultation.  Normal respiratory effort and rate.      Heart:    Regular rhythm and normal rate, normal S1 and S2, no murmurs, gallops or rubs.     Chest Wall:    No abnormalities observed   Abdomen:     Soft, nontender, positive bowel sounds.     Extremities:   no cyanosis, clubbing or edema.  No marked joint deformities.  Adequate musculoskeletal strength.       Results Review:    Results from last 7 days   Lab Units 06/10/25  0615   SODIUM mmol/L 141   POTASSIUM mmol/L 3.9   CHLORIDE mmol/L 105   CO2 mmol/L 25.2   BUN mg/dL " 19.0   CREATININE mg/dL 1.12*   GLUCOSE mg/dL 85   CALCIUM mg/dL 9.3         Results from last 7 days   Lab Units 06/10/25  0615   WBC 10*3/mm3 3.58   HEMOGLOBIN g/dL 8.5*   HEMATOCRIT % 25.3*   PLATELETS 10*3/mm3 246         Results from last 7 days   Lab Units 06/10/25  0615   MAGNESIUM mg/dL 1.8                   Medication Review:   acetaminophen, 1,000 mg, Oral, TID  amiodarone, 200 mg, Oral, Q24H  apixaban, 5 mg, Oral, Q12H  atorvastatin, 10 mg, Oral, Daily  cetirizine, 10 mg, Oral, Daily  docusate sodium, 100 mg, Oral, BID  fenofibrate, 145 mg, Oral, Daily  folic acid, 1 mg, Oral, Daily  gabapentin, 200 mg, Oral, Q12H  montelukast, 10 mg, Oral, Nightly  pantoprazole, 40 mg, Oral, Q AM  senna-docusate sodium, 2 tablet, Oral, Nightly              Assessment & Plan   Post operative AF  - brief and spontaneously converted back to SR. Started on oral amiodarone and apixaban. CHADs2 Vasc of 4. Echo with preserved LVEF , mild LAE, mild to moderate mitral valve regurgitation, aortic valve sclerosis - no AS. EKG this morning reviewed. SR , QT ok.   Lung changes status post chemotherapy and LL lobectomy.   HTN- takes lisinopril at home.  CKD     Rhythm and QT stable.   Continue amiodarone and apixaban at discharge with 2 week Zio.   She will establish care with [facility] cardiology in 3-4 weeks.   Ok for discharge from cardiac standpoint.     ROBE Kirby  [facility]  06/10/25  12:04 EDT

## 2025-06-11 NOTE — OUTREACH NOTE
Prep Survey      Flowsheet Row Responses   [facility] facility patient discharged from? [place]   Is LACE score < 7 ? No   Eligibility Readm Mgmt   Discharge diagnosis NSCLC of lower lobe   Malignant neoplasm of upper lobe, left bronchus or lungTHORACOSCOPY WITH LOBECTOMY WITH DAVINCI ROBOT, mediastinal and pilar lymph node dissection, intercostal nerve blocks, bronchoscopy   Does the patient have one of the following disease processes/diagnoses(primary or secondary)? Cardiothoracic surgery   Does the patient have Home health ordered? No   Is there a DME ordered? No   Prep survey completed? Yes            NOAM CAHRLES - Registered Nurse

## 2025-06-11 NOTE — OP NOTE
THORACOSCOPY WITH LOBECTOMY WITH DAVINCI ROBOT  Procedure Report    Patient Name:  Lolis Griffith  YOB: 1945    Date of Surgery:  6/6/2025     Indications:  Left lower lobe NSCLC    Pre-op Diagnosis:   Left lower lobe non-small cell lung cancer       Postop diagnosis:   Left lower lobe non-small cell lung cancer    Procedure(s):  THORACOSCOPY WITH left lower LOBECTOMY WITH DAVINCI ROBOT, mediastinal and pilar lymph node dissection, intercostal nerve blocks, bronchoscopy    Staff:  Surgeon(s):  Teodora Dozier MD    Assistant: Freddy Brown CSA was responsible for performing the following activities: Retraction, Suction, Closing, Placing Dressing, and Held/Positioned Camera and their skilled assistance was necessary for the success of this case.     Anesthesia: General with Block    Estimated Blood Loss: 100ml    Implants:    Implant Name Type Inv. Item Serial No.  Lot No. LRB No. Used Action   CLIP LIGAT VASC HORIZON TI SM/WD RD 6CT - [id number] Implant CLIP LIGAT VASC HORIZON TI SM/WD RD 6CT  [employer] [id number] Left 1 Implanted   RELOAD STPLR TISS SUREFORM/45 DAVINCI/X/XI 6ROW 4.3 GRN 1P/U - [id number] Implant RELOAD STPLR TISS SUREFORM/45 DAVINCI/X/XI 6ROW 4.3 GRN 1P/U  [employer] [id number] Left 2 Implanted   RELOAD STPLR [id number] CRV/TP 1FIRING 4ROW 2.5X8MM WHT 1P/U - [id number] Implant RELOAD STPLR [id number] CRV/TP 1FIRING 4ROW 2.5X8MM WHT 1P/U  [employer] [id number] Left 2 Implanted   RELOAD STPLR [id number] CRV/TP 1FIRING 4ROW 2.5X8MM WHT 1P/U - [id number] Implant RELOAD STPLR [id number] CRV/TP 1FIRING 4ROW 2.5X8MM WHT 1P/U  [employer] [id number] Left 2 Implanted       Specimen:          Specimens       ID Source Type Tests Collected By Collected At Frozen?    A Lung, L Tissue TISSUE PATHOLOGY EXAM   Teodora Dozier MD 6/6/25 1246 No    Description: Left level 8 lymph node-fresh for permanent    B Lung, L Tissue TISSUE PATHOLOGY  EXAM   Teodora Dozier MD 6/6/25 1246 No    Description: Left level 9 lymph node-fresh for permanent    C Lung, L Tissue TISSUE PATHOLOGY EXAM   Teodora Dozier MD 6/6/25 1246 No    Description: level 7 lymph node-fresh for permanent    D Lung, L Tissue TISSUE PATHOLOGY EXAM   Teodora Dozier MD 6/6/25 1254 No    Description: level 7 number 2 lymph node-fresh for permanent    E Lung, L Tissue TISSUE PATHOLOGY EXAM   Teodora Dozier MD 6/6/25 1257 No    Description: left level 5 lymph node-fresh for permanent    F Lung, L Tissue TISSUE PATHOLOGY EXAM   Teodoar Dozier MD 6/6/25 1330 No    Description: left level 10 lymph node-fresh for permanent    G Lung, L Tissue TISSUE PATHOLOGY EXAM   Teodora Dozier MD 6/6/25 1427 No    Description: left level 11 lymph node-fresh for permanent    H Lung, L Tissue TISSUE PATHOLOGY EXAM   Teodora Dozier MD 6/6/25 1427 No    Description: left lower lung lobectomy-fresh for permanent              Findings: Enlarged mediastinal lymph nodes     Complications: None apparent      Description of Procedure: Lolis Griffith was identified in the preoperative holding area and again her consent for the procedure was verified.  The patient was transported to the operating room and placed on the operating room table in supine position.  A general anesthetic was successfully administered and She was intubated with a double lumen endotracheal tube without difficulty.  Placement of the double lumen was confirmed with a video bronchoscope examination. A time out was performed to verify correct patient, correct procedure and the correct administration of IV antibiotics per [facility] protocol.    The patient was placed in right lateral decubitus position, left side up and all pressure points were padded.  The patient was prepped and draped in standard sterile fashion.  Robotic trocars were placed in the seventh intercostal space in standard fashion.  Insufflation was begun.  The robot was brought  onto the field and docked in standard fashion.  An assistant port was placed in the 10th intercostal space posterior axillary line.  Instruments were passed into the chest cavity under direct vision.  My assistant remained at the bedside to manipulate and pass instruments and I proceeded to the robotic console.    The lung was elevated and the inferior pulmonary ligament was divided using bipolar cautery.  There were multiple enlarged inferior pulmonary ligament lymph nodes which were dissected free and sent for pathologic evaluation.  There was a enlarged paraesophageal lymph node that was also dissected free and sent for pathologic evaluation.  The lung was retracted anteriorly and the posterior attachments of the hilum were taken down.  The subcarinal space was opened and there was an enlarged subcarinal lymph node that was dissected free and retrieved.  The dissection continued superiorly and the pulmonary artery was dissected free.  The AP window was opened and an AP window lymph node was retrieved.  I then turned my attention to the hilum.  The hilar tissue overlying the pulmonary artery was  divided to expose the pulmonary artery.  There were multiple enlarged lymph nodes which were removed.  The posterior attachments of the major fissure were encircled and divided with multiple sequential firings of the robotic tissue stapler.  The superior segment arterial branch was then encircled and divided with a robotic vascular stapler.  The basilar branches of the pulmonary artery to the lower lobe were encircled and divided with 1 sequential fires of a robotic tissue stapler.  The anterior portion of the major fissure was then encircled and divided using a robotic tissue stapler with multiple sequential firings.  The hilar lymph nodes associated with the bronchus were then dissected free and removed.  The lung was elevated and inferior pulmonary vein was encircled and divided with a robotic vascular staple load.   Care was taken to identify the superior vein prior to dividing the inferior pulmonary vein.  The bronchus was the only remaining structure.  This was encircled and divided using a robotic tissue stapler.  Prior to division, a bronchoscopy was performed to verify that the upper lobe bronchus was not narrowed.  The lung was placed in an Endo Catch bag and removed from the chest cavity without difficulty.     A rib block was performed using Marcaine and Exparel.  A 24 Wolof chest tube was placed in the 8th intercostal space incision and the lung was reexpanded under direct visualization.     The incisions were closed using deep vicryl sutures and Monocryl for the skin in standard fashion.  The chest tube was secured to the skin.      The patient tolerated this procedure well and was extubated and transported to the recovery room in stable condition.

## 2025-06-18 ENCOUNTER — TELEPHONE (OUTPATIENT)
Dept: SURGERY | Facility: CLINIC | Age: 80
End: 2025-06-18
Payer: MEDICARE

## 2025-06-18 ENCOUNTER — READMISSION MANAGEMENT (OUTPATIENT)
Dept: CALL CENTER | Facility: HOSPITAL | Age: 80
End: 2025-06-18
Payer: MEDICARE

## 2025-06-18 NOTE — OUTREACH NOTE
CT Surgery Week 1 Survey      Flowsheet Row Responses   [facility] patient discharged from? [facility]   Does the patient have one of the following disease processes/diagnoses(primary or secondary)? Cardiothoracic surgery   Week 1 attempt successful? Yes   Call start time 1036   Call end time 1038   Discharge diagnosis NSCLC of lower lobe   Malignant neoplasm of upper lobe, left bronchus or lungTHORACOSCOPY WITH LOBECTOMY WITH DAVINCI ROBOT, mediastinal and pilar lymph node dissection, intercostal nerve blocks, bronchoscopy   Person spoke with today (if not patient) and relationship Patient   Meds reviewed with patient/caregiver? Yes   Is the patient having any side effects they believe may be caused by any medication additions or changes? No   Does the patient have all medications related to this admission filled (includes all antibiotics, pain medications, cardiac medications, etc.) Yes   Is the patient taking all medications as directed (includes completed medication regime)? Yes   Comments regarding appointments Has appt with CTS on 6/19   Does the patient have a primary care provider?  Yes   Does the patient have an appointment scheduled with their C/T surgeon? Yes   Has the patient kept scheduled appointments due by today? N/A   Psychosocial issues? No   Did the patient receive a copy of their discharge instructions? Yes   Nursing interventions Reviewed instructions with patient   What is the patient's perception of their health status since discharge? Improving   Nursing interventions Nurse provided patient education   Is the patient/caregiver able to teach back signs and symptoms of incisional infection? Increased redness, swelling or pain at the incisonal site, Increased drainage or bleeding, Incisional warmth, Pus or odor from incision, Fever   If the patient is a current smoker, are they able to teach back resources for cessation? Not a smoker   Is the patient/caregiver able to teach back the  hierarchy of who to call/visit for symptoms/problems? PCP, Specialist, Home health nurse, Urgent Care, ED, 911 Yes   Week 1 call completed? Yes   Graduated Yes   Is the patient interested in additional calls from an ambulatory ? No   Would this patient benefit from a Referral to [facility] Social Work? No   Wrap up additional comments Patient is doing well. No s/s of infection. Has followups in place with MDs.   Call end time 1038              Jarrell BOND - Registered Nurse

## 2025-06-18 NOTE — TELEPHONE ENCOUNTER
CALLED AND SPOKE WITH PATIENT TO REMIND HER TO GET HER CXR PRIOR TO HER APPT TOMORROW. PATIENT VOICED AGREEMENT AND UNDERSTANDING.

## 2025-06-19 ENCOUNTER — OFFICE VISIT (OUTPATIENT)
Dept: SURGERY | Facility: CLINIC | Age: 80
End: 2025-06-19
Payer: MEDICARE

## 2025-06-19 ENCOUNTER — HOSPITAL ENCOUNTER (OUTPATIENT)
Dept: GENERAL RADIOLOGY | Facility: HOSPITAL | Age: 80
Discharge: HOME | End: 2025-06-19
Payer: MEDICARE

## 2025-06-19 VITALS
OXYGEN SATURATION: 97 % | DIASTOLIC BLOOD PRESSURE: 85 MMHG | BODY MASS INDEX: 31.34 KG/M2 | HEART RATE: 83 BPM | RESPIRATION RATE: 18 BRPM | SYSTOLIC BLOOD PRESSURE: 149 MMHG | WEIGHT: 194.2 LBS

## 2025-06-19 DIAGNOSIS — C34.32 MALIGNANT NEOPLASM OF LOWER LOBE OF LEFT LUNG: Primary | ICD-10-CM

## 2025-06-19 DIAGNOSIS — C34.30 NSCLC OF LOWER LOBE: ICD-10-CM

## 2025-06-19 PROCEDURE — 71046 X-RAY EXAM CHEST 2 VIEWS: CPT

## 2025-06-19 RX ORDER — AMIODARONE HYDROCHLORIDE 200 MG/1
200 TABLET ORAL
Qty: 30 TABLET | Refills: 0 | Status: SHIPPED | OUTPATIENT
Start: 2025-06-19

## 2025-06-19 NOTE — PROGRESS NOTES
"Chief Complaint  NSCLC of lower lobe (THORACOSCOPY WITH LOBECTOMY 6/6- 2 WK PO- CXR 6/19/2025)    Subjective        Lolis Griffith presents to [facility] THORACIC SURGERY  History of Present Illness  Ms. Griffith is a very pleasant 79-year-old lady who presents today in follow-up status post left lower lobectomy by Dr. Dozier on 6/6/2025.  She has stable postoperative course other than some A-fib for which she is following with cardiology.  She presents today feeling well.  She inquires today about her posture and if it will return to normal when she recovers.      Objective   Vital Signs:  /85 (BP Location: Left arm, Patient Position: Sitting, Cuff Size: Adult)   Pulse 83   Resp 18   Wt 88.1 kg (194 lb 3.2 oz)   SpO2 97%   BMI 31.34 kg/m²   Estimated body mass index is 31.34 kg/m² as calculated from the following:    Height as of 6/9/25: 167.6 cm (66\").    Weight as of this encounter: 88.1 kg (194 lb 3.2 oz).          Physical Exam  Constitutional:       General: She is not in acute distress.     Appearance: Normal appearance. She is not ill-appearing.   HENT:      Head: Normocephalic and atraumatic.   Cardiovascular:      Rate and Rhythm: Normal rate.   Pulmonary:      Effort: Pulmonary effort is normal. No respiratory distress.   Musculoskeletal:         General: Normal range of motion.      Cervical back: Normal range of motion and neck supple.      Comments: Poor posture   Skin:     General: Skin is warm and dry.      Comments: Postop incisions CDI, some shearing to the left anterior chest secondary to adhesive from EKG leads while hospitalized   Neurological:      General: No focal deficit present.      Mental Status: She is alert. Mental status is at baseline.      Motor: No weakness.   Psychiatric:         Mood and Affect: Mood normal.         Thought Content: Thought content normal.        Result Review :           X-ray performed prior to her appointment today demonstrates " adequate aeration and expansion of the lungs bilaterally.  No pneumonia or pneumothorax.    Pathology:[id number] adenocarcinoma         Assessment and Plan   Diagnoses and all orders for this visit:    1. Malignant neoplasm of lower lobe of left lung (Primary)  -     CT Chest Without Contrast; Future    Other orders  -     amiodarone (PACERONE) 200 MG tablet; Take 1 tablet by mouth Daily.  Dispense: 30 tablet; Refill: 0  -     apixaban (ELIQUIS) 5 MG tablet tablet; Take 1 tablet by mouth Every 12 (Twelve) Hours. Indications: Atrial Fibrillation  Dispense: 60 tablet; Refill: 0  -     mupirocin (BACTROBAN) 2 % nasal ointment; Administer 1 Application into the nostril(s) as directed by provider 2 (Two) Times a Day.  Dispense: 15 g; Refill: 0    Patient is a very pleasant 79-year-old lady who is status post left lower lobectomy by Dr. Dozier on 6/6/2025 for a ypT2a N2a adenocarcinoma.  She is recovering as expected is feeling well overall.  She inquires today about her posture and if it will return to normal.  I offered referral to physical therapy for strength training, however she declines at this time.  I will send in some ointment to be applied to her left anterior chest secondary to shearing.  I will refill 30-day supply of amiodarone and Eliquis per her request as she is not scheduled to see cardiology until 7/15/2025.  She will follow-up with oncology on 7/8/2025 to discuss next steps.  We will plan for surveillance CT chest in 3 months.    We will see her in the [place] office to review this imaging.       I spent 32 minutes caring for Lolis on this date of service. This time includes time spent by me in the following activities:preparing for the visit, reviewing tests, obtaining and/or reviewing a separately obtained history, performing a medically appropriate examination and/or evaluation , counseling and educating the patient/family/caregiver, ordering medications, tests, or procedures, referring and  communicating with other health care professionals , documenting information in the medical record, independently interpreting results and communicating that information with the patient/family/caregiver, and care coordination  Follow Up   Return in about 3 months (around 9/19/2025) for Next scheduled follow up.  Patient was given instructions and counseling regarding her condition or for health maintenance advice. Please see specific information pulled into the AVS if appropriate.

## 2025-07-02 LAB
CV ZIO AF AVG BPM: 105 BPM
CV ZIO AF BPM HIGH: 163 BPM
CV ZIO AF BPM LOW: 73 BPM
CV ZIO AF F EPI AVG BPM: 105 BPM
CV ZIO AF F EPI BPM HIGH: 163 BPM
CV ZIO AF F EPI BPM LOW: 73 BPM
CV ZIO AF F EPI DT: NORMAL
CV ZIO AF L EPI AVG BPM: 103 BPM
CV ZIO AF L EPI BPM HIGH: 151 BPM
CV ZIO AF L EPI BPM LOW: 79 BPM
CV ZIO AF L EPI DUR: 1750.1 SEC
CV ZIO AF L EPI END: NORMAL
CV ZIO AF L EPI START: NORMAL
CV ZIO AF PERCENT: 1 %
CV ZIO AF S EPI AVG BPM: 105 BPM
CV ZIO AF S EPI BPM HIGH: 163 BPM
CV ZIO AF S EPI BPM LOW: 73 BPM
CV ZIO AF S EPI DT: NORMAL
CV ZIO BASELINE AVG BPM: 71 BPM
CV ZIO BASELINE BPM HIGH: 163 BPM
CV ZIO BASELINE BPM LOW: 48 BPM
CV ZIO ECT SVE COUNT: 7498 EPISODES
CV ZIO ECT SVE CPLT COUNT: 562 EPISODES
CV ZIO ECT SVE CPLT FREQ: NORMAL
CV ZIO ECT SVE FREQ: NORMAL
CV ZIO ECT SVE TPLT COUNT: 92 EPISODES
CV ZIO ECT SVE TPLT FREQ: NORMAL
CV ZIO ECT VE COUNT: 5691 EPISODES
CV ZIO ECT VE CPLT COUNT: 0 EPISODES
CV ZIO ECT VE CPLT FREQ: 0
CV ZIO ECT VE FREQ: NORMAL
CV ZIO ECT VE TPLT COUNT: 0 EPISODES
CV ZIO ECT VE TPLT FREQ: 0
CV ZIO ECTOPIC SVE COUPLET RAW PERCENT: 0.08 %
CV ZIO ECTOPIC SVE ISOLATED PERCENT: 0.53 %
CV ZIO ECTOPIC SVE TRIPLET RAW PERCENT: 0.02 %
CV ZIO ECTOPIC VE COUPLET RAW PERCENT: 0 %
CV ZIO ECTOPIC VE ISOLATED PERCENT: 0.4 %
CV ZIO ECTOPIC VE TRIPLET RAW PERCENT: 0 %
CV ZIO ENROLLMENT END: NORMAL
CV ZIO ENROLLMENT START: NORMAL
CV ZIO IRREG TYPE: NORMAL
CV ZIO PATIENT EVENTS DIARIES: 0
CV ZIO PATIENT EVENTS TRIGGERS: 0
CV ZIO PAUSE COUNT: 0
CV ZIO PRESCRIPTION STATUS: NORMAL
CV ZIO SVT COUNT: 0
CV ZIO TOTAL  ENROLLMENT PERIOD: NORMAL
CV ZIO VT COUNT: 0

## 2025-07-02 PROCEDURE — 93248 EXT ECG>7D<15D REV&INTERPJ: CPT | Performed by: INTERNAL MEDICINE

## 2025-07-02 NOTE — PROGRESS NOTES
Enter Query Response Below      Query Response: yes               If applicable, please update the problem list.

## 2025-07-08 ENCOUNTER — OFFICE VISIT (OUTPATIENT)
Dept: ONCOLOGY | Facility: HOSPITAL | Age: 80
End: 2025-07-08
Payer: MEDICARE

## 2025-07-08 VITALS
OXYGEN SATURATION: 98 % | HEIGHT: 66 IN | SYSTOLIC BLOOD PRESSURE: 131 MMHG | RESPIRATION RATE: 18 BRPM | BODY MASS INDEX: 31.25 KG/M2 | HEART RATE: 72 BPM | TEMPERATURE: 97.9 F | WEIGHT: 194.45 LBS | DIASTOLIC BLOOD PRESSURE: 60 MMHG

## 2025-07-08 DIAGNOSIS — C34.32 MALIGNANT NEOPLASM OF LOWER LOBE OF LEFT LUNG: Primary | ICD-10-CM

## 2025-07-08 DIAGNOSIS — M79.2 NEUROPATHIC PAIN: ICD-10-CM

## 2025-07-08 DIAGNOSIS — Z79.899 ENCOUNTER FOR LONG-TERM CURRENT USE OF HIGH RISK MEDICATION: ICD-10-CM

## 2025-07-08 DIAGNOSIS — C34.30 NSCLC OF LOWER LOBE: ICD-10-CM

## 2025-07-08 PROCEDURE — G0463 HOSPITAL OUTPT CLINIC VISIT: HCPCS | Performed by: INTERNAL MEDICINE

## 2025-07-08 RX ORDER — GABAPENTIN 100 MG/1
200 CAPSULE ORAL EVERY 12 HOURS SCHEDULED
Qty: 120 CAPSULE | Refills: 2 | Status: SHIPPED | OUTPATIENT
Start: 2025-07-08 | End: 2025-08-07

## 2025-07-08 RX ORDER — SODIUM CHLORIDE 9 MG/ML
20 INJECTION, SOLUTION INTRAVENOUS ONCE
OUTPATIENT
Start: 2025-07-22

## 2025-07-08 NOTE — PROGRESS NOTES
Chief Complaint  NSCLC of lower lobe--5 WK FOLLOW UP 30 MIN    Mary Cruz, A*  Mary Cruz, ROBE    Subjective          Lolis Griffith presents to [facility] for lung adenocarcinoma    Patient is a very pleasant 79-year-old female with past medical history of hypertension, dyslipidemia, CKD who presents for lung adenocarcinoma.  Patient has CT chest done for lung nodule on 1/30/2025.  Shows stable irregular 1.6 cm left lower lobe nodule.  This showed stable mild mediastinal adenopathy which could be reactive or metastatic.  Ion bronchoscopy done same day showed left lower lobe biopsy positive for adenocarcinoma lipidic pattern.  EGFR, ROS, ALK all negative. PDL1 positive at 60%.  BAL culture positive for Pseudomonas.  She was started on IV cefepime for 10 days via PICC.  She has completed this.  MRI brain on 2/6/25 was negative for intracranial disease.  PET scan from 2/12/2025 showed a 1.6 cm left lower lobe nodule that was FDG avid.  FDG avid mediastinal and left hilar lymphadenopathy also shown.  Index subcarinal lymph nodes measure 2.5 cm with SUV max 6.6.  Also had a left infrahilar lymph node positive.  She has been started on chemotherapy with carboplatin pemetrexed and pembrolizumab as of 3/13/25. She has completed all 4 planned cycles as of 5/15/25.    Interval History  Patient presents for follow-up today.  She has completed all 4 cycles of chemotherapy with carboplatin pemetrexed and pembrolizumab as of 5/15/25.     Repeat PET on 5/20/2025 showed stable disease.     Patient underwent left lower lobe lobectomy on 6/6/2025 per Dr. Soto CT surgery.  This showed invasive carcinoma that measured 2.2 x 2.0 x 1.5 cm.  Tumor extends to the visceral pleura.  No definitive perineural or lymphovascular invasion.  Tumor confined to lung.  Margins negative.  7 lymph nodes with carcinoma present.  Pathologic ypT2a pN2a.    Patient has recovered well.  She  denies any pain.  She does have some tingling symptoms on the left side of her chest.  She takes gabapentin 200 mg twice a day for this.  She is requesting refill.  Reports her breathing is doing better.  She is still recovering but has improved.  Results of surgery discussed.  Patient is willing to resume pembrolizumab.          Oncology/Hematology History Overview Note   1/30/25: CT Chest done for lung nodule: Stable irregular 1.6 cm left lower lobe nodule, potentially neoplastic. Per Fleischner guidelines this could be assessed by short-term follow-up 3-month chest CT, PET/CT and/or biopsy. Recommend pulmonary consultation for management. Infectious/inflammatory lower lobe predominant tree-in-bud nodularity slightly waxing and waning since previous comparison. Findings could reflect bronchiolitis, microaspiration or atypical infection. Recommend attention on follow-up imaging.   Stable mild mediastinal adenopathy which could be reactive or metastatic pending etiology of the left lower lobe nodule. Exophytic indeterminate right upper pole renal lesion measuring 4.1 cm. At this size nonemergent renal ultrasound is recommended to assess if lesion is solid or cystic. Aortic and severe coronary atherosclerotic disease.    1/30/25: ION bronchoscopy with biopsy: BAL culture positive for >100K CFU/mL of Pseudomonas. Left lower lobe biopsy positive for adenocarcinoma, lepidic pattern, lung primary. EGFR, ROS, ALK negative. PDL1 positive at 60%.     Started on IV antibiotic (Cefepime) x 10 days for lung pseudomonas per pulmonary team.     2/6/25: MRI brain negative for intracranial disease    2/12/25: NM PET: A 1.6 cm left lower lobe nodule is FDG avid with SUV max 7.4. There is mild bibasilar atelectasis. There is FDG-avid mediastinal and left hilar lymphadenopathy. An index subcarinal lymph node on image 85 measures 2.5 x 1.3 cm with SUV max 6.6. An index left infrahilar lymph node on image 94 has SUV max 9.4, with note  that size measurement is difficult on this unenhanced exam. No metastatic disease outside of chest seen.     3/13/25: C1D1 neoadjuvant Carboplatin/Pemetrexed/Pembrolizumab  4/03/25: C2D1  5/13/25: C4D1 Carbo/Pemetrexed/Pembrolizumab      5/20/25: Repeat PET on 5/20/2025 showed stable size and hypermetabolic activity of 1.7 cm left lower lobe nodule.  Increased hypermetabolic activity of the paraesophageal node.  Another node appears stable.  Stable hypermetabolic left hilar node.  Hypermetabolic subcarinal lymph node is unchanged.  No metastatic disease seen.      6/6/2025: Left lower lobectomy resection per Dr. Dozier with CT surgery. This showed invasive carcinoma that measured 2.2 x 2.0 x 1.5 cm.  Tumor extends to the visceral pleura.  No definitive perineural or lymphovascular invasion.  Tumor confined to lung.  Margins negative.  7 lymph nodes with carcinoma present.  Pathologic ypT2a pN2a.     Malignant neoplasm of lower lobe of left lung   2/24/2025 Initial Diagnosis    Malignant neoplasm of lower lobe of left lung     2/24/2025 Cancer Staged    Staging form: Lung, AJCC V9  - Clinical: Stage IIB (cT1b, cN2a, cM0) - Signed by Reinaldo Banks MD on 2/24/2025     3/13/2025 - 5/15/2025 Chemotherapy    OP LUNG Pembrolizumab 200 mg / Pemetrexed / Carboplatin AUC=5     7/8/2025 Cancer Staged    Staging form: Lung, AJCC V9  - Pathologic: Stage IIIA (ypT2a, ypN2a, cM0) - Signed by Reinaldo Banks MD on 7/8/2025 7/22/2025 -  Chemotherapy    OP LUNG Pembrolizumab 200 mg           Review of Systems   Constitutional:  Positive for fatigue. Negative for appetite change, diaphoresis, fever, unexpected weight gain and unexpected weight loss.   HENT:  Negative for hearing loss, mouth sores, sore throat, swollen glands, trouble swallowing and voice change.    Eyes:  Negative for blurred vision.   Respiratory:  Negative for cough, shortness of breath and wheezing.    Cardiovascular:  Negative for chest pain  and palpitations.   Gastrointestinal:  Negative for abdominal pain, blood in stool, constipation, diarrhea, nausea and vomiting.   Endocrine: Negative for cold intolerance and heat intolerance.   Genitourinary:  Negative for difficulty urinating, dysuria, frequency, hematuria and urinary incontinence.   Musculoskeletal:  Negative for arthralgias, back pain and myalgias.        Swollen ankles    Skin:  Positive for dry skin and rash. Negative for skin lesions and wound.   Neurological:  Negative for dizziness, seizures, weakness, numbness and headache.   Hematological:  Does not bruise/bleed easily.   Psychiatric/Behavioral:  Negative for depressed mood. The patient is not nervous/anxious.    All other systems reviewed and are negative.    Current Outpatient Medications on File Prior to Visit   Medication Sig Dispense Refill    amiodarone (PACERONE) 200 MG tablet Take 1 tablet by mouth Daily. 30 tablet 0    apixaban (ELIQUIS) 5 MG tablet tablet Take 1 tablet by mouth Every 12 (Twelve) Hours. Indications: Atrial Fibrillation 60 tablet 0    cetirizine (zyrTEC) 10 MG tablet Take 1 tablet by mouth Every Night.      Cholecalciferol 125 MCG (5000 UT) tablet Take 1 tablet by mouth Daily.      Docusate Sodium 100 MG capsule Take 1 tablet by mouth Every Night.      estradiol (ESTRACE) 0.1 MG/GM vaginal cream 1 g 3 (Three) Times a Week.      ezetimibe (ZETIA) 10 MG tablet Take 1 tablet by mouth Every Night.      fenofibrate (TRICOR) 145 MG tablet Take 1 tablet by mouth Daily.      ferrous sulfate 324 (65 Fe) MG tablet delayed-release EC tablet Take 1 tablet by mouth Every Night.      folic acid (FOLVITE) 1 MG tablet Take 1 tablet by mouth Daily. Start at least 7 days prior to chemotherapy until at least 3 weeks after all chemotherapy. 30 tablet 4    gabapentin (NEURONTIN) 100 MG capsule Take 2 capsules by mouth Every 12 (Twelve) Hours for 30 days. 120 capsule 0    lisinopril (PRINIVIL,ZESTRIL) 5 MG tablet Take 1 tablet by  "mouth Daily. 30 tablet 3    loratadine (CLARITIN) 10 MG tablet Take 1 tablet by mouth Daily.      magnesium gluconate (MAGONATE) 500 MG tablet Take 0.5 tablets by mouth Every Night.      montelukast (SINGULAIR) 10 MG tablet Take 1 tablet by mouth Every Night.      mupirocin (BACTROBAN) 2 % nasal ointment Administer 1 Application into the nostril(s) as directed by provider 2 (Two) Times a Day. 15 g 0    omeprazole (priLOSEC) 40 MG capsule Take 1 capsule by mouth Daily As Needed.      ondansetron (ZOFRAN) 8 MG tablet Take 1 tablet by mouth 3 (Three) Times a Day As Needed for Nausea or Vomiting. 30 tablet 3    simvastatin (ZOCOR) 20 MG tablet Take 1 tablet by mouth Daily.      Unable to find Take 1 each by mouth Daily. PROBIOTIC PREBIOTICS AND CRANBERRY       No current facility-administered medications on file prior to visit.       No Known Allergies  Past Medical History:   Diagnosis Date    CKD (chronic kidney disease), stage III     COPD (chronic obstructive pulmonary disease)     PT DENIES THIS    GERD (gastroesophageal reflux disease)     History of chemotherapy     LAST TREATMENT 5-15-25    Hypertension Maybe 1986    Many, many years ago.    Lung nodules     Malignant neoplasm of lower lobe of left lung     Other hyperlipidemia     Pancreas disorder     states scanned yearly ot monitor \"fatty pancreas\"    Pneumonia     Port-A-Cath in place     RIGHT CHEST    Seasonal allergies      Past Surgical History:   Procedure Laterality Date    BLADDER REPAIR      BRONCHOSCOPY WITH ION ROBOTIC ASSIST Left 01/30/2025    Procedure: BRONCHOSCOPY WITH ION ROBOT: BAL, REBUS, NEEDLE ASPIRATE, BIOPSIES insertion of lighted robot navigated instrument to view inside the lung;  Surgeon: Stevie Carson DO;  Location: [facility] OR;  Service: Robotics - Pulmonary;  Laterality: Left;    EYE SURGERY Bilateral 2008    CATARACTS    LAPAROSCOPIC CHOLECYSTECTOMY      LOBECTOMY Left 06/06/2025    Procedure: THORACOSCOPY WITH " "LOBECTOMY WITH DAVINCI ROBOT, mediastinal and pilar lymph node dissection, intercostal nerve blocks, bronchoscopy;  Surgeon: Teodora Dozier MD;  Location: [facility] OR;  Service: Robotics - DaVinci;  Laterality: Left;    PARATHYROIDECTOMY      RECTAL PROLAPSE REPAIR  2021    TOTAL ABDOMINAL HYSTERECTOMY WITH SALPINGO OOPHORECTOMY      TOTAL HIP ARTHROPLASTY Left     VENOUS ACCESS DEVICE (PORT) INSERTION N/A 03/04/2025    Procedure: INSERTION VENOUS ACCESS DEVICE: Placement of a port in the tissue under the skin at your upper chest with a tube attached to it that goes into your vein;  Surgeon: Abhinav Martin MD;  Location: [facility];  Service: General;  Laterality: N/A;     Social History     Socioeconomic History    Marital status:    Tobacco Use    Smoking status: Never     Passive exposure: Never    Smokeless tobacco: Never   Vaping Use    Vaping status: Never Used   Substance and Sexual Activity    Alcohol use: Not Currently    Drug use: Never    Sexual activity: Defer     Family History   Problem Relation Age of Onset    Cancer Brother     Malig Hyperthermia Neg Hx        Objective   Physical Exam  Well appearing patient in no acute distress on RA  Anicteric sclerae, no rash on exposed skin  Respirations non-labored  Awake, alert, and oriented x 4. Speech intact. No gross neurologic deficit  Appropriate mood and affect    Vitals:    07/08/25 1433   BP: 131/60   Pulse: 72   Resp: 18   Temp: 97.9 °F (36.6 °C)   TempSrc: Oral   SpO2: 98%   Weight: 88.2 kg (194 lb 7.1 oz)   Height: 167.6 cm (65.98\")   PainSc: 0-No pain                     PHQ-9 Total Score:           Result Review :   The following data was reviewed by: Reinaldo Banks MD on 07/08/25:  Lab Results   Component Value Date    HGB 8.5 (L) 06/10/2025    HCT 25.3 (L) 06/10/2025    MCV 99.2 (H) 06/10/2025     06/10/2025    WBC 3.58 06/10/2025    NEUTROABS 1.87 06/10/2025    LYMPHSABS 0.95 06/10/2025    MONOSABS 0.51 06/10/2025 "    EOSABS 0.19 06/10/2025    BASOSABS 0.02 06/10/2025     Lab Results   Component Value Date    GLUCOSE 85 06/10/2025    BUN 19.0 06/10/2025    CREATININE 1.12 (H) 06/10/2025     06/10/2025    K 3.9 06/10/2025     06/10/2025    CO2 25.2 06/10/2025    CALCIUM 9.3 06/10/2025    PROTEINTOT 6.4 05/13/2025    ALBUMIN 4.1 05/13/2025    BILITOT 0.4 05/13/2025    ALKPHOS 41 05/13/2025    AST 26 05/13/2025    ALT 15 05/13/2025     Lab Results   Component Value Date    MG 1.8 06/10/2025    PHOS 3.1 08/06/2024    FREET4 1.12 04/22/2025    TSH 1.680 04/22/2025     Labs personally reviewed. CBC with low platelets, improved from prior. Creatinine normal. Na low. Cl low.     Pathology report personally reviewed    CT surgery note personally reviewed        XR Chest 2 View  Result Date: 6/21/2025   1. Improved aeration of the left lung base, with small residual ill-defined opacity. 2. Small left pleural effusion.  This report was finalized on 6/21/2025 9:36 AM by Dr. Ankush Baker M.D on Workstation: [id number]      XR Chest 1 View  Result Date: 6/10/2025   1. Stable small left apical pneumothorax. 2. Low lung volumes with similar-appearing bilateral perihilar and bibasilar opacities.  This report was finalized on 6/10/2025 7:17 AM by Christiano Meek MD on Workstation: [id number]      XR Chest 1 View  Result Date: 6/9/2025  Interval withdrawal of a left-sided chest tube. Small left apical pneumothorax without change. No new abnormality.   This report was finalized on 6/9/2025 7:25 AM by Manuelito Angeles M.D on Workstation: [id number]      XR Chest 1 View  Result Date: 6/8/2025  Persistent small left apical pneumothorax. Left chest tube is present. Lung volumes are diminished. No other change.    This report was finalized on 6/8/2025 8:11 AM by Manuelito Angeles M.D on Workstation: [id number]            Assessment and Plan    Diagnoses and all orders for this visit:    1. Malignant neoplasm of lower lobe of  left lung (Primary)  -     CBC and Differential; Future  -     Comprehensive metabolic panel; Future  -     TSH; Future  -     T4, free; Future  -     Lab Appointment Request; Future  -     Clinic Appointment Request; Future  -     Infusion Appointment Request 3; Future    2. NSCLC of lower lobe  -     gabapentin (NEURONTIN) 100 MG capsule; Take 2 capsules by mouth Every 12 (Twelve) Hours for 30 days.  Dispense: 120 capsule; Refill: 2    3. Encounter for long-term current use of high risk medication  -     CBC and Differential; Future  -     Comprehensive metabolic panel; Future  -     TSH; Future  -     T4, free; Future  -     Lab Appointment Request; Future  -     Clinic Appointment Request; Future  -     Infusion Appointment Request 3; Future    4. Neuropathic pain    Other orders  -     sodium chloride 0.9 % infusion  -     Pembrolizumab (KEYTRUDA) 200 mg in sodium chloride 0.9 % 58 mL chemo IVPB            Left lung adenocarcinoma  Clinical T1cN2a based off PET.  Lymph node biopsies have not been performed. Received 4 cycles of neoadjuvant carboplatin, pemetrexed combined with pembrolizumab. C1D1 Carbo/Pemetrexed/Pembrolizumab neoadjuvant 3/13/25. C4D1 Carbo/Pemetrexed/Pembrolizumab 5/13/25.     Repeat PET on 5/20/2025 showed stable size and hypermetabolic activity of 1.7 cm left lower lobe nodule.  Increased hypermetabolic activity of the paraesophageal node.  Another node appears stable.  Stable hypermetabolic left hilar node.  Hypermetabolic subcarinal lymph node is unchanged.     Left lower lobectomy 6/6/2025 per Dr. Dozier with CT surgery.  This showed invasive carcinoma that measured 2.2 x 2.0 x 1.5 cm.  Tumor extends to the visceral pleura.  No definitive perineural or lymphovascular invasion.  Tumor confined to lung.  Margins negative. 7 lymph nodes with carcinoma present.  Pathologic ypT2a pN2a.    Patient has recovered well.  Discussed use of adjuvant immunotherapy.  Will plan to resume pembrolizumab  200 mg IV every 3 weeks in 2 weeks.  Plan to complete 1 year of therapy with Pembrolizumab.    Neuropathic pain  From recent surgery. Refill gabapentin - 200 mg BID.     Thrombocytopenia  2/2 chemo. Plts improved to 113K today so appropriate for surgery.    Constipation  Managed with prune juice and metamucil. Improved. Miralax has not been helpful.    LE edema  Rec leg elevation       This is an acute or chronic illness that poses a threat to life or bodily function. The above treatment plan involves a high risk of complications and/or mortality of patient management.      I spent 35 minutes caring for Lolis on this date of service. This time includes time spent by me in the following activities:preparing for the visit, reviewing tests, obtaining and/or reviewing a separately obtained history, performing a medically appropriate examination and/or evaluation , counseling and educating the patient/family/caregiver, ordering medications, tests, or procedures, referring and communicating with other health care professionals , documenting information in the medical record, independently interpreting results and communicating that information with the patient/family/caregiver, and care coordination    Patient Follow Up: C2 adjuvant pembro  Patient was given instructions and counseling regarding her condition or for health maintenance advice. Please see specific information pulled into the AVS if appropriate.

## 2025-07-09 ENCOUNTER — DOCUMENTATION (OUTPATIENT)
Dept: PHARMACY | Facility: HOSPITAL | Age: 80
End: 2025-07-09
Payer: MEDICARE

## 2025-07-09 NOTE — PROGRESS NOTES
"PHARMACY C1D1 PRE VISIT ASSESSMENT    Patient will present for C1D1 of pembrolizumab 200 mg Q21D  79 y.o. female  Estimated body surface area is 1.98 meters squared as calculated from the following:    Height as of 7/8/25: 167.6 cm (65.98\").    Weight as of 7/8/25: 88.2 kg (194 lb 7.1 oz).    Past Medical History:   Diagnosis Date    CKD (chronic kidney disease), stage III     COPD (chronic obstructive pulmonary disease)     PT DENIES THIS    GERD (gastroesophageal reflux disease)     History of chemotherapy     LAST TREATMENT 5-15-25    Hypertension Maybe 1986    Many, many years ago.    Lung nodules     Malignant neoplasm of lower lobe of left lung     Other hyperlipidemia     Pancreas disorder     states scanned yearly ot monitor \"fatty pancreas\"    Pneumonia     Port-A-Cath in place     RIGHT CHEST    Seasonal allergies        Oncology/Hematology History Overview Note   1/30/25: CT Chest done for lung nodule: Stable irregular 1.6 cm left lower lobe nodule, potentially neoplastic. Per Fleischner guidelines this could be assessed by short-term follow-up 3-month chest CT, PET/CT and/or biopsy. Recommend pulmonary consultation for management. Infectious/inflammatory lower lobe predominant tree-in-bud nodularity slightly waxing and waning since previous comparison. Findings could reflect bronchiolitis, microaspiration or atypical infection. Recommend attention on follow-up imaging.   Stable mild mediastinal adenopathy which could be reactive or metastatic pending etiology of the left lower lobe nodule. Exophytic indeterminate right upper pole renal lesion measuring 4.1 cm. At this size nonemergent renal ultrasound is recommended to assess if lesion is solid or cystic. Aortic and severe coronary atherosclerotic disease.    1/30/25: ION bronchoscopy with biopsy: BAL culture positive for >100K CFU/mL of Pseudomonas. Left lower lobe biopsy positive for adenocarcinoma, lepidic pattern, lung primary. EGFR, ROS, ALK " negative. PDL1 positive at 60%.     Started on IV antibiotic (Cefepime) x 10 days for lung pseudomonas per pulmonary team.     2/6/25: MRI brain negative for intracranial disease    2/12/25: NM PET: A 1.6 cm left lower lobe nodule is FDG avid with SUV max 7.4. There is mild bibasilar atelectasis. There is FDG-avid mediastinal and left hilar lymphadenopathy. An index subcarinal lymph node on image 85 measures 2.5 x 1.3 cm with SUV max 6.6. An index left infrahilar lymph node on image 94 has SUV max 9.4, with note that size measurement is difficult on this unenhanced exam. No metastatic disease outside of chest seen.     3/13/25: C1D1 neoadjuvant Carboplatin/Pemetrexed/Pembrolizumab  4/03/25: C2D1  5/13/25: C4D1 Carbo/Pemetrexed/Pembrolizumab      5/20/25: Repeat PET on 5/20/2025 showed stable size and hypermetabolic activity of 1.7 cm left lower lobe nodule.  Increased hypermetabolic activity of the paraesophageal node.  Another node appears stable.  Stable hypermetabolic left hilar node.  Hypermetabolic subcarinal lymph node is unchanged.  No metastatic disease seen.      6/6/2025: Left lower lobectomy resection per Dr. Dozier with CT surgery. This showed invasive carcinoma that measured 2.2 x 2.0 x 1.5 cm.  Tumor extends to the visceral pleura.  No definitive perineural or lymphovascular invasion.  Tumor confined to lung.  Margins negative.  7 lymph nodes with carcinoma present.  Pathologic ypT2a pN2a.     Malignant neoplasm of lower lobe of left lung   2/24/2025 Initial Diagnosis    Malignant neoplasm of lower lobe of left lung     2/24/2025 Cancer Staged    Staging form: Lung, AJCC V9  - Clinical: Stage IIB (cT1b, cN2a, cM0) - Signed by Reinaldo Banks MD on 2/24/2025     3/13/2025 - 5/15/2025 Chemotherapy    OP LUNG Pembrolizumab 200 mg / Pemetrexed / Carboplatin AUC=5     7/8/2025 Cancer Staged    Staging form: Lung, AJCC V9  - Pathologic: Stage IIIA (ypT2a, ypN2a, cM0) - Signed by Reinaldo Banks  MD [facility] on 7/8/2025 7/22/2025 -  Chemotherapy    OP LUNG Pembrolizumab 200 mg         Relevant Pathology:   Final Diagnosis   Lung, left lower lobe, core biopsy:               - Adenocarcinoma, lepidic pattern, lung primary      The above positive (malignant) diagnosis was called to  Dr. MC Carson at  17:05 EST on  2/3/2025 by EMI.    Electronically signed by Luz Marina Fraser DO on 2/3/2025 at 1706     Final Diagnosis   1. Lung, left lower lobe, FNA:               - Suspicious for malignancy               - Suspicious for adenocarcinoma        2. Lung, left lower lobe, BAL:               - Atypical               - Atypical cells      Electronically signed by Luz Marina Fraser DO on 2/3/2025 at 1703           Potentially Actionable Mutation Present: YES PD-L1 60%     Final Diagnosis   1.  Lung, left lower lobe, lobectomy: Invasive adenocarcinoma               - A.  Histologic features:                            I.  Histologic type: Invasive acinar adenocarcinoma (93% acinar, 5% solid, 2% lepidic)                            II.  Grade: Moderately differentiated               - B.  Tumor size: Tumor measures 2.2 x 2.0 x 1.5 cm               - C.  Margins of resection and extent                            I.  The tumor extends through the visceral pleura but is not at a pleural surface                            II.  No definitive perineural or lymphovascular invasion is seen                            III.  The tumor is confined to the lung                            IV.  The tumor comes within 6 cm of the nearest vascular margin and 7 cm of the bronchial margin                            V.  No definitive treatment effect is identified               -D.  1 peribronchial lymph node is identified with metastatic disease measuring up to 1.5 mm with extracapsular extension     2.  Lymph node, level 8 lymph node, excision: 1 lymph node with metastatic adenocarcinoma measuring up to 10 mm with no extracapsular  extension and focal fibrosis suggesting treatment effect     3.  Lymph node, left level 9, excision: 1 node with metastatic adenocarcinoma measuring up to 3.5 mm     4.  Lymph node, level 7 lymph node, excision: 1 lymph node with metastatic adenocarcinoma measuring up to 10 mm     5.  Lymph node, level 7 lymph node, #2, excision: 1 lymph node with metastatic adenocarcinoma measuring up to 5.5 mm     6.  Lymph node, left level 5 lymph node, excision: Single benign lymph node     7.  Lymph node, left level 10 lymph node, excision: 1 lymph node with metastatic adenocarcinoma measuring up to 1 mm     8.  Lymph node, left level 11, excision: 1 lymph node with metastatic adenocarcinoma measuring up to 2.5 mm   Electronically signed by Jv Stevens MD on 6/9/2025 at 0829 EDT   Synoptic Checklist   LUNG   AJCC 9 - Protocol posted: 12/11/2024LUNG - All Specimens  SPECIMEN   Procedure  Lobectomy   Specimen Laterality  Left   TUMOR   Tumor Focality  Single focus   Tumor Site  Lower lobe of lung   Tumor Size     Invasive Tumor Size  Greatest Dimension (Centimeters): 2.2 cm   Additional Dimension (Centimeters)  2 cm     1.5 cm   Total Tumor Size  Greatest Dimension (Centimeters): 2.2 cm   Percentage of Total Tumor Size that is Invasive  98 %   Histologic Type  Invasive acinar adenocarcinoma   Histologic Patterns  Acinar: 93     Lepidic: 2     Solid: 5   Histologic Grade  G2, moderately differentiated   Spread Through Air Spaces (KATHERINE)  Not identified   Visceral Pleura Invasion  Present   Direct Invasion of Other Structures  Not applicable (no other structures present)   Treatment Effect  Not identified   Lymphatic and / or Vascular Invasion  Not identified   MARGINS   Margin Status for Invasive Tumor  All margins negative for invasive tumor   Closest Margin(s) to Invasive Tumor  Vascular   Distance from Invasive Tumor to Closest Margin  6 cm   Margin Status for Non-Invasive Tumor  All margins negative for non-invasive tumor    REGIONAL LYMPH NODES   Lymph Node(s) from Prior Procedures  No known prior lymph node sampling performed   Regional Lymph Node Status  Tumor present in regional lymph node(s)   Number of Lymph Nodes with Tumor  7   Saman Site(s) with Tumor  7: Subcarinal     8L: Para-esophageal (below paige)     9L: Pulmonary ligament     10L: Hilar     11L: Interlobar   Number of Lymph Nodes Examined  8   Saman Site(s) Examined  7: Subcarinal     5: Subaortic / aortopulmonary (AP) / AP window     8L: Para-esophageal (below paige)     9L: Pulmonary ligament     10L: Hilar     11L: Interlobar   pTNM CLASSIFICATION (AJCC Version 9)   Reporting of pT, pN, and (when applicable) pM categories is based on information available to the pathologist at the time the report is issued. As per the AJCC (Chapter 1, 8th Ed.) it is the managing physician's responsibility to establish the final pathologic stage based upon all pertinent information, including but potentially not limited to this pathology report.   Modified Classification  y   pT Category  pT2a   pN Category  pN2a   .      Comment    This case is neoadjuvant.  PD-L1 testing was performed on the prior biopsy [id number].        Relevant Imaging:    XR Chest 2 View  Result Date: 6/21/2025   1. Improved aeration of the left lung base, with small residual ill-defined opacity. 2. Small left pleural effusion.  This report was finalized on 6/21/2025 9:36 AM by Dr. Ankush Baker M.D on Workstation: [id number]      XR Chest 1 View  Result Date: 6/10/2025   1. Stable small left apical pneumothorax. 2. Low lung volumes with similar-appearing bilateral perihilar and bibasilar opacities.  This report was finalized on 6/10/2025 7:17 AM by Christiano Meek MD on Workstation: [id number]      XR Chest 1 View  Result Date: 6/9/2025  Interval withdrawal of a left-sided chest tube. Small left apical pneumothorax without change. No new abnormality.   This report was finalized on 6/9/2025 7:25 AM by  Manuelito Angeles M.D on Workstation: [id number]         Current treatment regimen has insurance authorization approval? YES     Medication treatment plan entered is appropriate per NCCN Guidelines    Pharmacy Follow-up Considerations:   Patient with left lung adenocarcinoma. Patient completed 4 cycles of pembrolizumab, pemetrexed, and carboplatin followed by lobectomy ([id number]). Plan for adjuvant pembrolizumab. Pharmacy will continue to monitor labs while on treatment and will  patient prior to first infusion on cycle 1 day 1.     Matty Lau, RadhaD, BCPS  7/9/2025  15:00 EDT

## 2025-07-15 ENCOUNTER — OFFICE VISIT (OUTPATIENT)
Dept: CARDIOLOGY | Facility: CLINIC | Age: 80
End: 2025-07-15
Payer: MEDICARE

## 2025-07-15 VITALS
DIASTOLIC BLOOD PRESSURE: 76 MMHG | WEIGHT: 188 LBS | HEART RATE: 58 BPM | BODY MASS INDEX: 31.32 KG/M2 | HEIGHT: 65 IN | SYSTOLIC BLOOD PRESSURE: 168 MMHG

## 2025-07-15 DIAGNOSIS — I10 ESSENTIAL HYPERTENSION: ICD-10-CM

## 2025-07-15 DIAGNOSIS — I25.10 CORONARY ARTERY CALCIFICATION SEEN ON CAT SCAN: ICD-10-CM

## 2025-07-15 DIAGNOSIS — E78.2 MIXED HYPERLIPIDEMIA: ICD-10-CM

## 2025-07-15 DIAGNOSIS — Z85.118 HISTORY OF LUNG CANCER: ICD-10-CM

## 2025-07-15 DIAGNOSIS — I48.0 PAROXYSMAL ATRIAL FIBRILLATION: Primary | ICD-10-CM

## 2025-07-15 NOTE — PROGRESS NOTES
"  CARDIOLOGY INITIAL CONSULT       Chief Complaint  Establish Care and Atrial Fibrillation    Subjective            Lolis Griffith presents to [facility] CARDIOLOGY  History of Present Illness  Patient comes to establish cardiac care.  She has past medical history of lung cancer status post resection of the left lower lobe and chemotherapy.  She is about to initiate Keytruda post immunotherapy.  She has a history of paroxysmal atrial fibrillation and is anticoagulated with Eliquis 5 mg oral twice a day without any side effects so far.  Also takes amiodarone 200 mg oral daily.  Patient has essential hypertension and coronary calcifications by previous CT.  He denies dyspnea or angina.  He also denies palpitations or previous syncope.  The recent 2D echo showed preserved ejection fraction with diastolic dysfunction and mild to moderate eccentric mitral valve insufficiency and mild aortic valve sclerosis.  She had a previous unremarkable nuclear stress test.  Recent cardiac monitor showed 1% burden of paroxysmal atrial fibrillation.       Past History:    Medical History:  Past Medical History:   Diagnosis Date    Atrial fibrillation     CKD (chronic kidney disease), stage III     COPD (chronic obstructive pulmonary disease)     PT DENIES THIS    GERD (gastroesophageal reflux disease)     Heart murmur     History of chemotherapy     LAST TREATMENT 5-15-25    Hypertension Maybe 1986    Many, many years ago.    Lung nodules     Malignant neoplasm of lower lobe of left lung     Other hyperlipidemia     Pancreas disorder     states scanned yearly ot monitor \"fatty pancreas\"    Pneumonia     Port-A-Cath in place     RIGHT CHEST    Seasonal allergies        Surgical History: has a past surgical history that includes Total abdominal hysterectomy w/ bilateral salpingoophorectomy; Bladder repair; Laparoscopic cholecystectomy; Total hip arthroplasty (Left); BRONCHOSCOPY WITH ION ROBOTIC ASSIST (Left, " 01/30/2025); Rectal prolapse repair (2021); Parathyroidectomy; Venous Access Device (Port) (N/A, 03/04/2025); Eye surgery (Bilateral, 2008); and lobectomy (Left, 06/06/2025).     Family History: family history includes Cancer in her brother.     Social History: reports that she has never smoked. She has never been exposed to tobacco smoke. She has never used smokeless tobacco. She reports that she does not drink alcohol and does not use drugs.    Allergies: Patient has no known allergies.    Current Outpatient Medications on File Prior to Visit   Medication Sig    amiodarone (PACERONE) 200 MG tablet Take 1 tablet by mouth Daily.    apixaban (ELIQUIS) 5 MG tablet tablet Take 1 tablet by mouth Every 12 (Twelve) Hours. Indications: Atrial Fibrillation    cetirizine (zyrTEC) 10 MG tablet Take 1 tablet by mouth Every Night.    Cholecalciferol 125 MCG (5000 UT) tablet Take 1 tablet by mouth Daily.    Docusate Sodium 100 MG capsule Take 1 tablet by mouth Every Night.    estradiol (ESTRACE) 0.1 MG/GM vaginal cream 1 g 3 (Three) Times a Week.    ezetimibe (ZETIA) 10 MG tablet Take 1 tablet by mouth Every Night.    fenofibrate (TRICOR) 145 MG tablet Take 1 tablet by mouth Daily.    ferrous sulfate 324 (65 Fe) MG tablet delayed-release EC tablet Take 1 tablet by mouth Every Night.    folic acid (FOLVITE) 1 MG tablet Take 1 tablet by mouth Daily. Start at least 7 days prior to chemotherapy until at least 3 weeks after all chemotherapy.    gabapentin (NEURONTIN) 100 MG capsule Take 2 capsules by mouth Every 12 (Twelve) Hours for 30 days.    lisinopril (PRINIVIL,ZESTRIL) 5 MG tablet Take 1 tablet by mouth Daily.    loratadine (CLARITIN) 10 MG tablet Take 1 tablet by mouth Daily.    magnesium gluconate (MAGONATE) 500 MG tablet Take 0.5 tablets by mouth Every Night.    montelukast (SINGULAIR) 10 MG tablet Take 1 tablet by mouth Every Night.    mupirocin (BACTROBAN) 2 % nasal ointment Administer 1 Application into the nostril(s) as  "directed by provider 2 (Two) Times a Day.    omeprazole (priLOSEC) 40 MG capsule Take 1 capsule by mouth Daily As Needed.    ondansetron (ZOFRAN) 8 MG tablet Take 1 tablet by mouth 3 (Three) Times a Day As Needed for Nausea or Vomiting.    simvastatin (ZOCOR) 20 MG tablet Take 1 tablet by mouth Daily.    Unable to find Take 1 each by mouth Daily. PROBIOTIC PREBIOTICS AND CRANBERRY     No current facility-administered medications on file prior to visit.          Review of Systems   All systems were reviewed and negative    Objective     /76 (BP Location: Left arm)   Pulse 58   Ht 165.1 cm (65\")   Wt 85.3 kg (188 lb)   BMI 31.28 kg/m²       Physical Exam  Alert  Heart. Regular, no gallops, no rubs  Lungs: no rales, wheezing Left side.   LE: no edema  Neurologic. No motor deficits.     Recheck /85 mmHg.         Result Review :     The following data was reviewed by: Ryan Penaloza MD on 07/15/2025:    CMP          6/7/2025    05:28 6/9/2025    09:17 6/10/2025    06:15   CMP   Glucose 99  85  85    BUN 21.0  18.0  19.0    Creatinine 1.07  1.19  1.12    EGFR 53.6  47.2  50.8    Sodium 141  144  141    Potassium 3.9  3.9  3.9    Chloride 108  107  105    Calcium 8.2  9.1  9.3    BUN/Creatinine Ratio 19.6  15.1  17.0    Anion Gap 8.8  8.9  10.8      CBC          6/6/2025    10:20 6/7/2025    05:28 6/10/2025    06:15   CBC   WBC 3.03  3.97  3.58    RBC 2.67  2.29  2.55    Hemoglobin 8.9  7.6  8.5    Hematocrit 26.5  22.8  25.3    MCV 99.3  99.6  99.2    MCH 33.3  33.2  33.3    MCHC 33.6  33.3  33.6    RDW 19.0  18.9  18.0    Platelets 212  194  246      TSH          2/27/2025    06:53 3/13/2025    09:53 4/22/2025    08:59   TSH   TSH 1.750  0.476  1.680      Lipid Panel          9/3/2024    07:08 11/27/2024    06:40 2/27/2025    06:53   Lipid Panel   Total Cholesterol 137  146  170    Triglycerides 64  60  70    HDL Cholesterol 54  61  62    VLDL Cholesterol 13  12  13    LDL Cholesterol  70  73  95  "   LDL/HDL Ratio 1.30  1.20  1.52         Data reviewed: Cardiology studies    Results for orders placed during the hospital encounter of 06/06/25    Adult Transthoracic Echo Complete W/ Cont if Necessary Per Protocol    Interpretation Summary    Left ventricular systolic function is normal. Calculated left ventricular EF = 60.6%    Left ventricular diastolic function is consistent with (grade II w/high LAP) pseudonormalization.    Aortic valve sclerosis without hemodynamically significant stenosis.    Mild-moderate mitral regurgitation.    Estimated right ventricular systolic pressure from tricuspid regurgitation is normal (<35 mmHg).    The left atrial cavity is mildly dilated.    No prior study available for comparison.      Results for orders placed during the hospital encounter of 03/03/25    Stress Test With Myocardial Perfusion - One Day 03/04/2025 11:08 AM    Interpretation Summary  Low risk, abnormal, regadenoson myocardial perfusion imaging study.  There is a fixed mid apical Perfusion defect noticed persisting on attenuation correction images suggestive of either artifact or scar.  There are small sized, mild intensity perfusion defect of distal anterior distal lateral and distal inferior wall segments which paradoxically correct on stress images suggestive of artifact.  Overall less than 5% of LV myocardium is involved.  Stress EF was estimated to be 54%.  Transient ischemic dilatation was noted, 1.31 however it significance is undetermined.  EKG changes are nondiagnostic.  Rare PACs were noted in recovery phase.  Otherwise no significant arrhythmias were noted               Assessment and Plan        Diagnoses and all orders for this visit:    1. Paroxysmal atrial fibrillation (Primary)  -     Thyroid Panel With TSH; Future  -     Hepatic Function Panel; Future  -     CBC & Differential; Future    2. Essential hypertension    3. Mixed hyperlipidemia    4. Coronary artery calcification seen on CAT  scan    5. History of lung cancer        The patient has a BETSY?DS?-VASc Score of 4 and benefits  for continuation of Eliquis 5 mL oral twice a day for prevention of TIA/stroke together with thromboembolic disease.  Continue statin therapy for goal LDL less than 70.  Continue with blood pressure control, goal less than 130/85 mmHg.  Will check TSH, function test and CBC to review the next office visit in 4 months.  Continue with amiodarone daily at 200 mg.  Continue with lifestyle edification healthy diet.  Advised to increase her physical activities.    I spent 45 minutes caring for Lolis on this date of service. This time includes time spent by me in the following activities:reviewing tests, obtaining and/or reviewing a separately obtained history, performing a medically appropriate examination and/or evaluation , ordering medications, tests, or procedures, and documenting information in the medical record    Follow Up     Return in about 4 months (around 11/15/2025) for Ethan, After testing.    Patient was given instructions and counseling regarding her condition or for health maintenance advice. Please see specific information pulled into the AVS if appropriate.

## 2025-07-17 ENCOUNTER — TELEPHONE (OUTPATIENT)
Dept: CARDIOLOGY | Facility: CLINIC | Age: 80
End: 2025-07-17
Payer: MEDICARE

## 2025-07-17 NOTE — TELEPHONE ENCOUNTER
Procedure: Melanoma Removal    Medication Directive: Eliquis    PMH: PAF, HTN, HLD, CAC seen on CT, Hx of Lung Cancer    Last Seen: 07/15/25

## 2025-07-22 ENCOUNTER — HOSPITAL ENCOUNTER (OUTPATIENT)
Dept: ONCOLOGY | Facility: HOSPITAL | Age: 80
Discharge: HOME | End: 2025-07-22
Payer: MEDICARE

## 2025-07-22 VITALS
SYSTOLIC BLOOD PRESSURE: 138 MMHG | DIASTOLIC BLOOD PRESSURE: 58 MMHG | BODY MASS INDEX: 32.26 KG/M2 | HEIGHT: 65 IN | WEIGHT: 193.6 LBS | TEMPERATURE: 97.7 F | HEART RATE: 65 BPM | RESPIRATION RATE: 18 BRPM | OXYGEN SATURATION: 99 %

## 2025-07-22 DIAGNOSIS — J15.1 PNEUMONIA DUE TO PSEUDOMONAS SPECIES, UNSPECIFIED LATERALITY, UNSPECIFIED PART OF LUNG: ICD-10-CM

## 2025-07-22 DIAGNOSIS — C34.32 MALIGNANT NEOPLASM OF LOWER LOBE OF LEFT LUNG: Primary | ICD-10-CM

## 2025-07-22 DIAGNOSIS — Z79.899 ENCOUNTER FOR LONG-TERM CURRENT USE OF HIGH RISK MEDICATION: ICD-10-CM

## 2025-07-22 DIAGNOSIS — Z45.2 ENCOUNTER FOR ADJUSTMENT OR MANAGEMENT OF VASCULAR ACCESS DEVICE: Primary | ICD-10-CM

## 2025-07-22 DIAGNOSIS — C34.32 MALIGNANT NEOPLASM OF LOWER LOBE OF LEFT LUNG: ICD-10-CM

## 2025-07-22 LAB
ALBUMIN SERPL-MCNC: 3.8 G/DL (ref 3.5–5.2)
ALBUMIN/GLOB SERPL: 1.5 G/DL
ALP SERPL-CCNC: 36 U/L (ref 39–117)
ALT SERPL W P-5'-P-CCNC: 10 U/L (ref 1–33)
ANION GAP SERPL CALCULATED.3IONS-SCNC: 10.9 MMOL/L (ref 5–15)
AST SERPL-CCNC: 25 U/L (ref 1–32)
BASOPHILS # BLD AUTO: 0.04 10*3/MM3 (ref 0–0.2)
BASOPHILS NFR BLD AUTO: 0.7 % (ref 0–1.5)
BILIRUB SERPL-MCNC: 0.2 MG/DL (ref 0–1.2)
BUN SERPL-MCNC: 29.8 MG/DL (ref 8–23)
BUN/CREAT SERPL: 23.5 (ref 7–25)
CALCIUM SPEC-SCNC: 9.4 MG/DL (ref 8.6–10.5)
CHLORIDE SERPL-SCNC: 99 MMOL/L (ref 98–107)
CO2 SERPL-SCNC: 23.1 MMOL/L (ref 22–29)
CREAT SERPL-MCNC: 1.27 MG/DL (ref 0.57–1)
DEPRECATED RDW RBC AUTO: 52.9 FL (ref 37–54)
EGFRCR SERPLBLD CKD-EPI 2021: 43.6 ML/MIN/1.73
EOSINOPHIL # BLD AUTO: 0.65 10*3/MM3 (ref 0–0.4)
EOSINOPHIL NFR BLD AUTO: 12.1 % (ref 0.3–6.2)
ERYTHROCYTE [DISTWIDTH] IN BLOOD BY AUTOMATED COUNT: 14 % (ref 12.3–15.4)
GLOBULIN UR ELPH-MCNC: 2.6 GM/DL
GLUCOSE SERPL-MCNC: 100 MG/DL (ref 65–99)
HCT VFR BLD AUTO: 33.2 % (ref 34–46.6)
HGB BLD-MCNC: 10.7 G/DL (ref 12–15.9)
IMM GRANULOCYTES # BLD AUTO: 0.01 10*3/MM3 (ref 0–0.05)
IMM GRANULOCYTES NFR BLD AUTO: 0.2 % (ref 0–0.5)
LYMPHOCYTES # BLD AUTO: 1.33 10*3/MM3 (ref 0.7–3.1)
LYMPHOCYTES NFR BLD AUTO: 24.8 % (ref 19.6–45.3)
MCH RBC QN AUTO: 32.9 PG (ref 26.6–33)
MCHC RBC AUTO-ENTMCNC: 32.2 G/DL (ref 31.5–35.7)
MCV RBC AUTO: 102.2 FL (ref 79–97)
MONOCYTES # BLD AUTO: 0.42 10*3/MM3 (ref 0.1–0.9)
MONOCYTES NFR BLD AUTO: 7.8 % (ref 5–12)
NEUTROPHILS NFR BLD AUTO: 2.92 10*3/MM3 (ref 1.7–7)
NEUTROPHILS NFR BLD AUTO: 54.4 % (ref 42.7–76)
NRBC BLD AUTO-RTO: 0 /100 WBC (ref 0–0.2)
PLATELET # BLD AUTO: 154 10*3/MM3 (ref 140–450)
PMV BLD AUTO: 9.7 FL (ref 6–12)
POTASSIUM SERPL-SCNC: 3.9 MMOL/L (ref 3.5–5.2)
PROT SERPL-MCNC: 6.4 G/DL (ref 6–8.5)
RBC # BLD AUTO: 3.25 10*6/MM3 (ref 3.77–5.28)
SODIUM SERPL-SCNC: 133 MMOL/L (ref 136–145)
T4 FREE SERPL-MCNC: 1.28 NG/DL (ref 0.92–1.68)
TSH SERPL DL<=0.05 MIU/L-ACNC: 2.5 UIU/ML (ref 0.27–4.2)
WBC NRBC COR # BLD AUTO: 5.37 10*3/MM3 (ref 3.4–10.8)

## 2025-07-22 PROCEDURE — 96413 CHEMO IV INFUSION 1 HR: CPT

## 2025-07-22 PROCEDURE — 85025 COMPLETE CBC W/AUTO DIFF WBC: CPT | Performed by: INTERNAL MEDICINE

## 2025-07-22 PROCEDURE — 84443 ASSAY THYROID STIM HORMONE: CPT | Performed by: INTERNAL MEDICINE

## 2025-07-22 PROCEDURE — 25010000002 PEMBROLIZUMAB 100 MG/4ML SOLUTION 4 ML VIAL: Performed by: INTERNAL MEDICINE

## 2025-07-22 PROCEDURE — 25010000002 HEPARIN LOCK FLUSH PER 10 UNITS: Performed by: INTERNAL MEDICINE

## 2025-07-22 PROCEDURE — 25810000003 SODIUM CHLORIDE 0.9 % SOLUTION: Performed by: INTERNAL MEDICINE

## 2025-07-22 PROCEDURE — 84439 ASSAY OF FREE THYROXINE: CPT | Performed by: INTERNAL MEDICINE

## 2025-07-22 PROCEDURE — 80053 COMPREHEN METABOLIC PANEL: CPT | Performed by: INTERNAL MEDICINE

## 2025-07-22 RX ORDER — SODIUM CHLORIDE 9 MG/ML
20 INJECTION, SOLUTION INTRAVENOUS ONCE
Status: COMPLETED | OUTPATIENT
Start: 2025-07-22 | End: 2025-07-22

## 2025-07-22 RX ORDER — SODIUM CHLORIDE 0.9 % (FLUSH) 0.9 %
20 SYRINGE (ML) INJECTION AS NEEDED
Status: DISCONTINUED | OUTPATIENT
Start: 2025-07-22 | End: 2025-07-23 | Stop reason: HOSPADM

## 2025-07-22 RX ORDER — HEPARIN SODIUM (PORCINE) LOCK FLUSH IV SOLN 100 UNIT/ML 100 UNIT/ML
500 SOLUTION INTRAVENOUS AS NEEDED
OUTPATIENT
Start: 2025-07-22

## 2025-07-22 RX ORDER — HEPARIN SODIUM (PORCINE) LOCK FLUSH IV SOLN 100 UNIT/ML 100 UNIT/ML
500 SOLUTION INTRAVENOUS AS NEEDED
Status: DISCONTINUED | OUTPATIENT
Start: 2025-07-22 | End: 2025-07-23 | Stop reason: HOSPADM

## 2025-07-22 RX ORDER — SODIUM CHLORIDE 0.9 % (FLUSH) 0.9 %
20 SYRINGE (ML) INJECTION AS NEEDED
OUTPATIENT
Start: 2025-07-22

## 2025-07-22 RX ADMIN — Medication 20 ML: at 14:31

## 2025-07-22 RX ADMIN — HEPARIN 500 UNITS: 100 SYRINGE at 14:31

## 2025-07-22 RX ADMIN — Medication 20 ML: at 12:28

## 2025-07-22 RX ADMIN — SODIUM CHLORIDE 20 ML/HR: 9 INJECTION, SOLUTION INTRAVENOUS at 13:40

## 2025-07-22 RX ADMIN — SODIUM CHLORIDE 200 MG: 9 INJECTION, SOLUTION INTRAVENOUS at 13:51

## 2025-07-30 ENCOUNTER — TELEPHONE (OUTPATIENT)
Dept: SURGERY | Facility: CLINIC | Age: 80
End: 2025-07-30
Payer: MEDICARE

## 2025-07-30 NOTE — TELEPHONE ENCOUNTER
Spoke to patient, advised that we received a prescription request for Amiodarone. Advised that Dr. Dozier's APRN [place] advised that she would need to discuss this medication with Dr. Orozco for refills. Patient stated her PCP refilled it. Patient did not have any further questions

## 2025-08-01 DIAGNOSIS — I48.91 NEW ONSET A-FIB: ICD-10-CM

## 2025-08-01 DIAGNOSIS — C34.32 MALIGNANT NEOPLASM OF LOWER LOBE OF LEFT LUNG: Primary | ICD-10-CM

## 2025-08-04 RX ORDER — APIXABAN 5 MG/1
TABLET, FILM COATED ORAL
Qty: 60 TABLET | Refills: 0 | OUTPATIENT
Start: 2025-08-04

## 2025-08-05 ENCOUNTER — TRANSCRIBE ORDERS (OUTPATIENT)
Dept: LAB | Facility: HOSPITAL | Age: 80
End: 2025-08-05
Payer: MEDICARE

## 2025-08-05 ENCOUNTER — LAB (OUTPATIENT)
Dept: LAB | Facility: HOSPITAL | Age: 80
End: 2025-08-05
Payer: MEDICARE

## 2025-08-05 DIAGNOSIS — E55.9 VITAMIN D DEFICIENCY: ICD-10-CM

## 2025-08-05 DIAGNOSIS — N18.31 CHRONIC KIDNEY DISEASE (CKD) STAGE G3A/A1, MODERATELY DECREASED GLOMERULAR FILTRATION RATE (GFR) BETWEEN 45-59 ML/MIN/1.73 SQUARE METER AND ALBUMINURIA CREATININE RATIO LESS THAN 30 MG/G (CMS/H*: Primary | ICD-10-CM

## 2025-08-05 DIAGNOSIS — N18.31 CHRONIC KIDNEY DISEASE (CKD) STAGE G3A/A1, MODERATELY DECREASED GLOMERULAR FILTRATION RATE (GFR) BETWEEN 45-59 ML/MIN/1.73 SQUARE METER AND ALBUMINURIA CREATININE RATIO LESS THAN 30 MG/G (CMS/H*: ICD-10-CM

## 2025-08-05 LAB
25(OH)D3 SERPL-MCNC: 69.2 NG/ML (ref 30–100)
ALBUMIN SERPL-MCNC: 4.1 G/DL (ref 3.5–5.2)
ANION GAP SERPL CALCULATED.3IONS-SCNC: 13 MMOL/L (ref 5–15)
BACTERIA UR QL AUTO: ABNORMAL /HPF
BASOPHILS # BLD AUTO: 0.03 10*3/MM3 (ref 0–0.2)
BASOPHILS NFR BLD AUTO: 0.7 % (ref 0–1.5)
BILIRUB UR QL STRIP: NEGATIVE
BUN SERPL-MCNC: 33 MG/DL (ref 8–23)
BUN/CREAT SERPL: 25 (ref 7–25)
CALCIUM SPEC-SCNC: 10.1 MG/DL (ref 8.6–10.5)
CHLORIDE SERPL-SCNC: 101 MMOL/L (ref 98–107)
CLARITY UR: ABNORMAL
CO2 SERPL-SCNC: 25 MMOL/L (ref 22–29)
COLOR UR: YELLOW
CREAT SERPL-MCNC: 1.32 MG/DL (ref 0.57–1)
CREAT UR-MCNC: 30.3 MG/DL
DEPRECATED RDW RBC AUTO: 45.7 FL (ref 37–54)
EGFRCR SERPLBLD CKD-EPI 2021: 41.7 ML/MIN/1.73
EOSINOPHIL # BLD AUTO: 0.86 10*3/MM3 (ref 0–0.4)
EOSINOPHIL NFR BLD AUTO: 19.2 % (ref 0.3–6.2)
ERYTHROCYTE [DISTWIDTH] IN BLOOD BY AUTOMATED COUNT: 12.5 % (ref 12.3–15.4)
GLUCOSE SERPL-MCNC: 80 MG/DL (ref 65–99)
GLUCOSE UR STRIP-MCNC: NEGATIVE MG/DL
HCT VFR BLD AUTO: 37.1 % (ref 34–46.6)
HGB BLD-MCNC: 12.1 G/DL (ref 12–15.9)
HGB UR QL STRIP.AUTO: NEGATIVE
HYALINE CASTS UR QL AUTO: ABNORMAL /LPF
IMM GRANULOCYTES # BLD AUTO: 0 10*3/MM3 (ref 0–0.05)
IMM GRANULOCYTES NFR BLD AUTO: 0 % (ref 0–0.5)
KETONES UR QL STRIP: NEGATIVE
LEUKOCYTE ESTERASE UR QL STRIP.AUTO: NEGATIVE
LYMPHOCYTES # BLD AUTO: 1.53 10*3/MM3 (ref 0.7–3.1)
LYMPHOCYTES NFR BLD AUTO: 34.1 % (ref 19.6–45.3)
MCH RBC QN AUTO: 32.4 PG (ref 26.6–33)
MCHC RBC AUTO-ENTMCNC: 32.6 G/DL (ref 31.5–35.7)
MCV RBC AUTO: 99.5 FL (ref 79–97)
MONOCYTES # BLD AUTO: 0.41 10*3/MM3 (ref 0.1–0.9)
MONOCYTES NFR BLD AUTO: 9.1 % (ref 5–12)
NEUTROPHILS NFR BLD AUTO: 1.66 10*3/MM3 (ref 1.7–7)
NEUTROPHILS NFR BLD AUTO: 36.9 % (ref 42.7–76)
NITRITE UR QL STRIP: NEGATIVE
NRBC BLD AUTO-RTO: 0 /100 WBC (ref 0–0.2)
PH UR STRIP.AUTO: 6.5 [PH] (ref 5–8)
PHOSPHATE SERPL-MCNC: 3.8 MG/DL (ref 2.5–4.5)
PLATELET # BLD AUTO: 192 10*3/MM3 (ref 140–450)
PMV BLD AUTO: 9.8 FL (ref 6–12)
POTASSIUM SERPL-SCNC: 4.2 MMOL/L (ref 3.5–5.2)
PROT ?TM UR-MCNC: 4.1 MG/DL
PROT UR QL STRIP: NEGATIVE
PROT/CREAT UR: 0.14 MG/G{CREAT}
PTH-INTACT SERPL-MCNC: 16.2 PG/ML (ref 15–65)
RBC # BLD AUTO: 3.73 10*6/MM3 (ref 3.77–5.28)
RBC # UR STRIP: ABNORMAL /HPF
REF LAB TEST METHOD: ABNORMAL
SODIUM SERPL-SCNC: 139 MMOL/L (ref 136–145)
SP GR UR STRIP: 1.01 (ref 1–1.03)
SQUAMOUS #/AREA URNS HPF: ABNORMAL /HPF
UROBILINOGEN UR QL STRIP: ABNORMAL
WBC # UR STRIP: ABNORMAL /HPF
WBC NRBC COR # BLD AUTO: 4.49 10*3/MM3 (ref 3.4–10.8)

## 2025-08-05 PROCEDURE — 84156 ASSAY OF PROTEIN URINE: CPT

## 2025-08-05 PROCEDURE — 80069 RENAL FUNCTION PANEL: CPT

## 2025-08-05 PROCEDURE — 82306 VITAMIN D 25 HYDROXY: CPT

## 2025-08-05 PROCEDURE — 83970 ASSAY OF PARATHORMONE: CPT

## 2025-08-05 PROCEDURE — 82570 ASSAY OF URINE CREATININE: CPT

## 2025-08-05 PROCEDURE — 85025 COMPLETE CBC W/AUTO DIFF WBC: CPT

## 2025-08-05 PROCEDURE — 36415 COLL VENOUS BLD VENIPUNCTURE: CPT

## 2025-08-05 PROCEDURE — 81001 URINALYSIS AUTO W/SCOPE: CPT

## 2025-08-12 ENCOUNTER — OFFICE VISIT (OUTPATIENT)
Dept: ONCOLOGY | Facility: HOSPITAL | Age: 80
End: 2025-08-12
Payer: MEDICARE

## 2025-08-12 ENCOUNTER — HOSPITAL ENCOUNTER (OUTPATIENT)
Dept: ONCOLOGY | Facility: HOSPITAL | Age: 80
Discharge: HOME | End: 2025-08-12
Payer: MEDICARE

## 2025-08-12 VITALS
BODY MASS INDEX: 32.14 KG/M2 | HEART RATE: 58 BPM | OXYGEN SATURATION: 100 % | DIASTOLIC BLOOD PRESSURE: 62 MMHG | TEMPERATURE: 98.2 F | WEIGHT: 192.9 LBS | RESPIRATION RATE: 18 BRPM | HEIGHT: 65 IN | SYSTOLIC BLOOD PRESSURE: 153 MMHG

## 2025-08-12 DIAGNOSIS — Z45.2 ENCOUNTER FOR ADJUSTMENT OR MANAGEMENT OF VASCULAR ACCESS DEVICE: Primary | ICD-10-CM

## 2025-08-12 DIAGNOSIS — C34.32 MALIGNANT NEOPLASM OF LOWER LOBE OF LEFT LUNG: Primary | ICD-10-CM

## 2025-08-12 DIAGNOSIS — N18.31 STAGE 3A CHRONIC KIDNEY DISEASE: ICD-10-CM

## 2025-08-12 DIAGNOSIS — J15.1 PNEUMONIA DUE TO PSEUDOMONAS SPECIES, UNSPECIFIED LATERALITY, UNSPECIFIED PART OF LUNG: ICD-10-CM

## 2025-08-12 DIAGNOSIS — R21 RASH: ICD-10-CM

## 2025-08-12 DIAGNOSIS — C34.32 MALIGNANT NEOPLASM OF LOWER LOBE OF LEFT LUNG: ICD-10-CM

## 2025-08-12 DIAGNOSIS — Z79.899 ENCOUNTER FOR LONG-TERM CURRENT USE OF HIGH RISK MEDICATION: ICD-10-CM

## 2025-08-12 LAB
ALBUMIN SERPL-MCNC: 4.3 G/DL (ref 3.5–5.2)
ALBUMIN/GLOB SERPL: 1.8 G/DL
ALP SERPL-CCNC: 39 U/L (ref 39–117)
ALT SERPL W P-5'-P-CCNC: 12 U/L (ref 1–33)
ANION GAP SERPL CALCULATED.3IONS-SCNC: 8.7 MMOL/L (ref 5–15)
AST SERPL-CCNC: 27 U/L (ref 1–32)
BASOPHILS # BLD AUTO: 0.04 10*3/MM3 (ref 0–0.2)
BASOPHILS NFR BLD AUTO: 0.6 % (ref 0–1.5)
BILIRUB SERPL-MCNC: 0.2 MG/DL (ref 0–1.2)
BUN SERPL-MCNC: 31.5 MG/DL (ref 8–23)
BUN/CREAT SERPL: 26.7 (ref 7–25)
CALCIUM SPEC-SCNC: 9.4 MG/DL (ref 8.6–10.5)
CHLORIDE SERPL-SCNC: 98 MMOL/L (ref 98–107)
CO2 SERPL-SCNC: 24.3 MMOL/L (ref 22–29)
CREAT SERPL-MCNC: 1.18 MG/DL (ref 0.57–1)
DEPRECATED RDW RBC AUTO: 47.1 FL (ref 37–54)
EGFRCR SERPLBLD CKD-EPI 2021: 47.7 ML/MIN/1.73
EOSINOPHIL # BLD AUTO: 0.57 10*3/MM3 (ref 0–0.4)
EOSINOPHIL NFR BLD AUTO: 9.2 % (ref 0.3–6.2)
ERYTHROCYTE [DISTWIDTH] IN BLOOD BY AUTOMATED COUNT: 13.2 % (ref 12.3–15.4)
GLOBULIN UR ELPH-MCNC: 2.4 GM/DL
GLUCOSE SERPL-MCNC: 105 MG/DL (ref 65–99)
HCT VFR BLD AUTO: 36.8 % (ref 34–46.6)
HGB BLD-MCNC: 12.1 G/DL (ref 12–15.9)
IMM GRANULOCYTES # BLD AUTO: 0.01 10*3/MM3 (ref 0–0.05)
IMM GRANULOCYTES NFR BLD AUTO: 0.2 % (ref 0–0.5)
LYMPHOCYTES # BLD AUTO: 1.27 10*3/MM3 (ref 0.7–3.1)
LYMPHOCYTES NFR BLD AUTO: 20.5 % (ref 19.6–45.3)
MCH RBC QN AUTO: 32.2 PG (ref 26.6–33)
MCHC RBC AUTO-ENTMCNC: 32.9 G/DL (ref 31.5–35.7)
MCV RBC AUTO: 97.9 FL (ref 79–97)
MONOCYTES # BLD AUTO: 0.41 10*3/MM3 (ref 0.1–0.9)
MONOCYTES NFR BLD AUTO: 6.6 % (ref 5–12)
NEUTROPHILS NFR BLD AUTO: 3.91 10*3/MM3 (ref 1.7–7)
NEUTROPHILS NFR BLD AUTO: 62.9 % (ref 42.7–76)
NRBC BLD AUTO-RTO: 0 /100 WBC (ref 0–0.2)
PLATELET # BLD AUTO: 184 10*3/MM3 (ref 140–450)
PMV BLD AUTO: 9.6 FL (ref 6–12)
POTASSIUM SERPL-SCNC: 4.4 MMOL/L (ref 3.5–5.2)
PROT SERPL-MCNC: 6.7 G/DL (ref 6–8.5)
RBC # BLD AUTO: 3.76 10*6/MM3 (ref 3.77–5.28)
SODIUM SERPL-SCNC: 131 MMOL/L (ref 136–145)
WBC NRBC COR # BLD AUTO: 6.21 10*3/MM3 (ref 3.4–10.8)

## 2025-08-12 PROCEDURE — 85025 COMPLETE CBC W/AUTO DIFF WBC: CPT | Performed by: INTERNAL MEDICINE

## 2025-08-12 PROCEDURE — 25010000002 PEMBROLIZUMAB 100 MG/4ML SOLUTION 4 ML VIAL: Performed by: INTERNAL MEDICINE

## 2025-08-12 PROCEDURE — 25010000002 HEPARIN LOCK FLUSH PER 10 UNITS: Performed by: INTERNAL MEDICINE

## 2025-08-12 PROCEDURE — 25810000003 SODIUM CHLORIDE 0.9 % SOLUTION: Performed by: INTERNAL MEDICINE

## 2025-08-12 PROCEDURE — 80053 COMPREHEN METABOLIC PANEL: CPT | Performed by: INTERNAL MEDICINE

## 2025-08-12 PROCEDURE — 96413 CHEMO IV INFUSION 1 HR: CPT

## 2025-08-12 RX ORDER — SODIUM CHLORIDE 0.9 % (FLUSH) 0.9 %
20 SYRINGE (ML) INJECTION AS NEEDED
Status: DISCONTINUED | OUTPATIENT
Start: 2025-08-12 | End: 2025-08-13 | Stop reason: HOSPADM

## 2025-08-12 RX ORDER — HEPARIN SODIUM (PORCINE) LOCK FLUSH IV SOLN 100 UNIT/ML 100 UNIT/ML
500 SOLUTION INTRAVENOUS AS NEEDED
Status: DISCONTINUED | OUTPATIENT
Start: 2025-08-12 | End: 2025-08-13 | Stop reason: HOSPADM

## 2025-08-12 RX ORDER — SODIUM CHLORIDE 9 MG/ML
20 INJECTION, SOLUTION INTRAVENOUS ONCE
Status: COMPLETED | OUTPATIENT
Start: 2025-08-12 | End: 2025-08-12

## 2025-08-12 RX ORDER — FLUOCINONIDE 0.5 MG/G
1 CREAM TOPICAL 2 TIMES DAILY
Qty: 60 G | Refills: 1 | Status: SHIPPED | OUTPATIENT
Start: 2025-08-12

## 2025-08-12 RX ORDER — HEPARIN SODIUM (PORCINE) LOCK FLUSH IV SOLN 100 UNIT/ML 100 UNIT/ML
500 SOLUTION INTRAVENOUS AS NEEDED
OUTPATIENT
Start: 2025-08-12

## 2025-08-12 RX ORDER — SODIUM CHLORIDE 0.9 % (FLUSH) 0.9 %
20 SYRINGE (ML) INJECTION AS NEEDED
OUTPATIENT
Start: 2025-08-12

## 2025-08-12 RX ORDER — SODIUM CHLORIDE 9 MG/ML
20 INJECTION, SOLUTION INTRAVENOUS ONCE
Status: CANCELLED | OUTPATIENT
Start: 2025-08-12

## 2025-08-12 RX ADMIN — SODIUM CHLORIDE 20 ML/HR: 9 INJECTION, SOLUTION INTRAVENOUS at 13:44

## 2025-08-12 RX ADMIN — Medication 20 ML: at 14:28

## 2025-08-12 RX ADMIN — HEPARIN 500 UNITS: 100 SYRINGE at 14:28

## 2025-08-12 RX ADMIN — Medication 20 ML: at 12:16

## 2025-08-12 RX ADMIN — SODIUM CHLORIDE 200 MG: 9 INJECTION, SOLUTION INTRAVENOUS at 13:47

## 2025-08-21 ENCOUNTER — TRANSCRIBE ORDERS (OUTPATIENT)
Dept: ADMINISTRATIVE | Facility: HOSPITAL | Age: 80
End: 2025-08-21
Payer: MEDICARE

## 2025-08-21 DIAGNOSIS — M81.8 OTHER OSTEOPOROSIS WITHOUT CURRENT PATHOLOGICAL FRACTURE: Primary | ICD-10-CM

## 2025-08-29 ENCOUNTER — TELEPHONE (OUTPATIENT)
Dept: ONCOLOGY | Facility: HOSPITAL | Age: 80
End: 2025-08-29
Payer: MEDICARE

## (undated) DEVICE — ANTIBACTERIAL UNDYED BRAIDED (POLYGLACTIN 910), SYNTHETIC ABSORBABLE SUTURE: Brand: COATED VICRYL

## (undated) DEVICE — STPCK 4WY ON/OFF VLV M/COLAR FIT 45PSI STRL

## (undated) DEVICE — BLADELESS OBTURATOR: Brand: WECK VISTA

## (undated) DEVICE — MAJ-1414 SINGLE USE ADPATER BIOPSY VALV: Brand: SINGLE USE ADAPTOR BIOPSY VALVE

## (undated) DEVICE — COLUMN DRAPE

## (undated) DEVICE — STPCK 1WY SPINLOCK FML LL NONDEHP LF .20ML

## (undated) DEVICE — NDL INJ UROL WILLIAMS CYSTO 3.7F 23G 8MM

## (undated) DEVICE — SOLIDIFIER LIQLOC PLS 1500CC BT

## (undated) DEVICE — ARM DRAPE

## (undated) DEVICE — REPROCESSING CONSUMABLES KIT

## (undated) DEVICE — SOL NACL 0.9PCT 1000ML

## (undated) DEVICE — OASIS DRAIN, SINGLE, INLINE & ATS COMPATIBLE: Brand: OASIS

## (undated) DEVICE — VISION PROBE: Brand: ION

## (undated) DEVICE — SYR SLP TP 10ML DISP

## (undated) DEVICE — Device

## (undated) DEVICE — SUT SILK 0 TIES 30IN A306H

## (undated) DEVICE — CVR PROB ULTRASND CIVFLEX GEN/PURP TELESCP/FOLD 5.5X58IN LF

## (undated) DEVICE — THE STERILE LIGHT HANDLE COVER IS USED WITH STERIS SURGICAL LIGHTING AND VISUALIZATION SYSTEMS.

## (undated) DEVICE — SINGLE USE BIOPSY VALVE MAJ-210: Brand: SINGLE USE BIOPSY VALVE (STERILE)

## (undated) DEVICE — LINER SURG CANSTR SXN S/RIGD 1500CC

## (undated) DEVICE — SOL ANTISTICK CAUTRY ELECTROLUBE LF

## (undated) DEVICE — Device: Brand: TISSUE RETRIEVAL SYSTEM

## (undated) DEVICE — KT CLN CLEANOR SCPE

## (undated) DEVICE — ST TBG AIRSEAL BIF FLTR W/ACT/CHARCOAL/FLTR

## (undated) DEVICE — EXTENSION SET, MALE LUER LOCK ADAPTER WITH RETRACTABLE COLLAR

## (undated) DEVICE — SLV SCD KN/LEN ADJ EXPRSS BLENDED MD 1P/U

## (undated) DEVICE — ELECTRD BLD EZ CLN MOD XLNG 2.75IN

## (undated) DEVICE — BASN EMESS 500ML GRY

## (undated) DEVICE — GLV SURG BIOGEL LTX PF 6

## (undated) DEVICE — APPL CHLORAPREP HI/LITE 26ML ORNG

## (undated) DEVICE — SUT MNCRYL PLS ANTIB UD 4/0 PS2 18IN

## (undated) DEVICE — EXOFIN PRECISION PEN HIGH VISCOSITY TOPICAL SKIN ADHESIVE: Brand: EXOFIN PRECISION PEN, 1G

## (undated) DEVICE — REPROCESSING COVER

## (undated) DEVICE — INTENDED FOR TISSUE SEPARATION, AND OTHER PROCEDURES THAT REQUIRE A SHARP SURGICAL BLADE TO PUNCTURE OR CUT.: Brand: BARD-PARKER ® CARBON RIB-BACK BLADES

## (undated) DEVICE — SYR LUERLOK 20CC BX/50

## (undated) DEVICE — TRAP,MUCUS SPECIMEN,40CC: Brand: MEDLINE

## (undated) DEVICE — LOU THORACIC: Brand: MEDLINE INDUSTRIES, INC.

## (undated) DEVICE — FRCP BIOP CAPTURA BRONCH 1.8 110CM BLU

## (undated) DEVICE — 24 FR STRAIGHT – SOFT PVC CATHETER: Brand: PVC THORACIC CATHETERS

## (undated) DEVICE — CATHETER GUIDE

## (undated) DEVICE — SUREFORM 45 CURVED-TIP: Brand: SUREFORM

## (undated) DEVICE — 8MM STAPLER: Brand: SUREFORM

## (undated) DEVICE — SWIVEL CONNECTOR

## (undated) DEVICE — CONTAINER,SPEC,PNEUM TUBE,3OZ,STRL PATH: Brand: MEDLINE

## (undated) DEVICE — CATHETER: Brand: ION

## (undated) DEVICE — GOWN ISOL OVR/HD TIE THMB/LP/CUF PE XLG BLU

## (undated) DEVICE — SUT SILK 0 PSL 18IN 580H

## (undated) DEVICE — PENCL ES MEGADINE EZ/CLEAN BUTN W/HOLSTR 10FT

## (undated) DEVICE — SEAL

## (undated) DEVICE — PENCL SMOKE/EVAC MEGADYNE TELESCP 10FT

## (undated) DEVICE — GOWN,SIRUS,NONRNF,SETINSLV,XL,20/CS: Brand: MEDLINE

## (undated) DEVICE — ENDOPATH XCEL BLADELESS TROCARS WITH STABILITY SLEEVES: Brand: ENDOPATH XCEL

## (undated) DEVICE — SYR LUER SLPTP 50ML

## (undated) DEVICE — KT LINEN QUICKSQUITE SAHARA STD DRW/LIFT 60X78IN

## (undated) DEVICE — 3M™ STERI-DRAPE™ INSTRUMENT POUCH 1018L: Brand: STERI-DRAPE™

## (undated) DEVICE — DISPOSABLE CYTOLOGY BRUSH: Brand: DISPOSABLE CYTOLOGY BRUSH

## (undated) DEVICE — HYPODERMIC SAFETY NEEDLE: Brand: MONOJECT

## (undated) DEVICE — SUT MNCRYL 4/0 PS2 18 IN

## (undated) DEVICE — SINGLE USE SUCTION VALVE MAJ-209: Brand: SINGLE USE SUCTION VALVE (STERILE)

## (undated) DEVICE — TROC BLADLES AIRSEAL/OPTI THRD 8X120MM 1P/U

## (undated) DEVICE — CONTAINER,SPECIMEN,OR STERILE,4OZ: Brand: MEDLINE

## (undated) DEVICE — SYR PREFIL W/SAL FLSH 10ML BX/30

## (undated) DEVICE — 1LYRTR 16FR10ML100%SIL UMS SNP: Brand: MEDLINE INDUSTRIES, INC.

## (undated) DEVICE — SENSOR CONNECTION CLEANER

## (undated) DEVICE — SYR LL TP 10ML STRL

## (undated) DEVICE — KIT,ANTI FOG,W/SPONGE & FLUID,SOFT PACK: Brand: MEDLINE

## (undated) DEVICE — BIOPSY NEEDLE, 21G: Brand: FLEXISION

## (undated) DEVICE — GLV SURG BIOGEL LTX PF 7 1/2

## (undated) DEVICE — 2, DISPOSABLE SUCTION/IRRIGATOR WITH DISPOSABLE TIP: Brand: STRYKEFLOW

## (undated) DEVICE — REPROCESSING ACCESSORIES KIT

## (undated) DEVICE — VISION PROBE ADAPTER AND SUCTION ADAPTER

## (undated) DEVICE — PATIENT RETURN ELECTRODE, SINGLE-USE, CONTACT QUALITY MONITORING, ADULT, WITH 9FT CORD, FOR PATIENTS WEIGING OVER 33LBS. (15KG): Brand: MEGADYNE

## (undated) DEVICE — ANTIBACTERIAL VIOLET BRAIDED (POLYGLACTIN 910), SYNTHETIC ABSORBABLE SUTURE: Brand: COATED VICRYL

## (undated) DEVICE — ADHS SKIN SURG TISS VISC PREMIERPRO EXOFIN HI/VISC 1ML

## (undated) DEVICE — VASCULAR ACCESS-LF: Brand: MEDLINE INDUSTRIES, INC.

## (undated) DEVICE — MARKR SKIN W/RULR 2TP

## (undated) DEVICE — LP VESL MAXI 2.5X1MM RED 2PK

## (undated) DEVICE — SPNG LAP CIGARETTE KITTNER 5MM STRL PK/5